# Patient Record
Sex: FEMALE | Race: WHITE
[De-identification: names, ages, dates, MRNs, and addresses within clinical notes are randomized per-mention and may not be internally consistent; named-entity substitution may affect disease eponyms.]

---

## 2018-02-15 ENCOUNTER — HOSPITAL ENCOUNTER (EMERGENCY)
Dept: HOSPITAL 53 - M ED | Age: 58
LOS: 1 days | Discharge: HOME | End: 2018-02-16
Payer: MEDICARE

## 2018-02-15 DIAGNOSIS — Z98.890: ICD-10-CM

## 2018-02-15 DIAGNOSIS — K80.20: Primary | ICD-10-CM

## 2018-02-15 DIAGNOSIS — Z86.69: ICD-10-CM

## 2018-02-15 DIAGNOSIS — Z87.442: ICD-10-CM

## 2018-02-15 DIAGNOSIS — Z79.899: ICD-10-CM

## 2018-02-15 DIAGNOSIS — Z88.5: ICD-10-CM

## 2018-02-15 DIAGNOSIS — Z98.0: ICD-10-CM

## 2018-02-15 DIAGNOSIS — F33.9: ICD-10-CM

## 2018-02-15 LAB
ALBUMIN/GLOBULIN RATIO: 1.14 (ref 1–1.93)
ALBUMIN: 4 GM/DL (ref 3.2–5.2)
ALKALINE PHOSPHATASE: 119 U/L (ref 45–117)
ALT SERPL W P-5'-P-CCNC: 22 U/L (ref 12–78)
AMORPHOUS SEDIMENT RFX: (no result)
ANION GAP: 6 MEQ/L (ref 8–16)
AST SERPL-CCNC: 20 U/L (ref 7–37)
BASO #: 0 10^3/UL (ref 0–0.2)
BASO %: 0.3 % (ref 0–1)
BILIRUB CONJ SERPL-MCNC: 0.1 MG/DL (ref 0–0.2)
BILIRUBIN,TOTAL: 0.6 MG/DL (ref 0.2–1)
BLOOD UREA NITROGEN: 12 MG/DL (ref 7–18)
CALCIUM LEVEL: 9.9 MG/DL (ref 8.5–10.1)
CARBON DIOXIDE LEVEL: 30 MEQ/L (ref 21–32)
CHLORIDE LEVEL: 104 MEQ/L (ref 98–107)
CREATININE FOR GFR: 0.79 MG/DL (ref 0.55–1.3)
EOS #: 0 10^3/UL (ref 0–0.5)
EOSINOPHIL NFR BLD AUTO: 0.3 % (ref 0–3)
GFR SERPL CREATININE-BSD FRML MDRD: > 60 ML/MIN/{1.73_M2} (ref 51–?)
GLUCOSE, FASTING: 118 MG/DL (ref 70–100)
HEMATOCRIT: 45.4 % (ref 36–47)
HEMOGLOBIN: 15 G/DL (ref 12–16)
IMMATURE GRANULOCYTE %: 0.2 % (ref 0–3)
LEUKOCYTE ESTERASE UR AUTO RFX: NEGATIVE
LIPASE: 118 U/L (ref 73–393)
LYMPH #: 1.7 10^3/UL (ref 1.5–4.5)
LYMPH %: 14.7 % (ref 24–44)
MEAN CORPUSCULAR HEMOGLOBIN: 29.8 PG (ref 27–33)
MEAN CORPUSCULAR HGB CONC: 33 G/DL (ref 32–36.5)
MEAN CORPUSCULAR VOLUME: 90.3 FL (ref 80–96)
MONO #: 0.4 10^3/UL (ref 0–0.8)
MONO %: 3.7 % (ref 0–5)
NEUTROPHILS #: 9.5 10^3/UL (ref 1.8–7.7)
NEUTROPHILS %: 80.8 % (ref 36–66)
NRBC BLD AUTO-RTO: 0 % (ref 0–0)
PLATELET COUNT, AUTOMATED: 282 10^3/UL (ref 150–450)
POTASSIUM SERUM: 4.1 MEQ/L (ref 3.5–5.1)
RED BLOOD COUNT: 5.03 10^6/UL (ref 4–5.4)
RED CELL DISTRIBUTION WIDTH: 13.8 % (ref 11.5–14.5)
SODIUM LEVEL: 140 MEQ/L (ref 136–145)
SPECIFIC GRAVITY UR AUTO RFX: 1.01 (ref 1–1.03)
SQUAM EPITHELIAL CELL UR AURFX: 0 /HPF (ref 0–6)
TOTAL PROTEIN: 7.5 GM/DL (ref 6.4–8.2)
WHITE BLOOD COUNT: 11.8 10^3/UL (ref 4–10)

## 2018-02-15 RX ADMIN — ONDANSETRON 1 MG: 2 INJECTION INTRAMUSCULAR; INTRAVENOUS at 21:28

## 2018-02-15 RX ADMIN — SODIUM CHLORIDE 1 MLS/HR: 9 INJECTION, SOLUTION INTRAVENOUS at 21:30

## 2018-02-15 RX ADMIN — KETOROLAC TROMETHAMINE 1 MG: 30 INJECTION, SOLUTION INTRAMUSCULAR at 21:28

## 2018-02-20 ENCOUNTER — HOSPITAL ENCOUNTER (INPATIENT)
Dept: HOSPITAL 53 - M PED | Age: 58
LOS: 5 days | Discharge: HOME | DRG: 336 | End: 2018-02-25
Attending: SURGERY | Admitting: SURGERY
Payer: MEDICARE

## 2018-02-20 DIAGNOSIS — M19.90: ICD-10-CM

## 2018-02-20 DIAGNOSIS — Z98.84: ICD-10-CM

## 2018-02-20 DIAGNOSIS — E66.9: ICD-10-CM

## 2018-02-20 DIAGNOSIS — K46.0: Primary | ICD-10-CM

## 2018-02-20 DIAGNOSIS — Z90.49: ICD-10-CM

## 2018-02-20 DIAGNOSIS — K56.52: ICD-10-CM

## 2018-02-20 DIAGNOSIS — Z96.653: ICD-10-CM

## 2018-02-20 LAB
ALBUMIN/GLOBULIN RATIO: 1.38 (ref 1–1.93)
ALBUMIN: 4.4 GM/DL (ref 3.2–5.2)
ALKALINE PHOSPHATASE: 148 U/L (ref 45–117)
ALT SERPL W P-5'-P-CCNC: 25 U/L (ref 12–78)
AMYLASE SERPL-CCNC: 41 U/L (ref 25–115)
ANION GAP: 10 MEQ/L (ref 8–16)
AST SERPL-CCNC: 36 U/L (ref 7–37)
BASO #: 0 10^3/UL (ref 0–0.2)
BASO %: 0.5 % (ref 0–1)
BILIRUB CONJ SERPL-MCNC: 0.2 MG/DL (ref 0–0.2)
BILIRUBIN,TOTAL: 0.8 MG/DL (ref 0.2–1)
BLOOD UREA NITROGEN: 19 MG/DL (ref 7–18)
CALCIUM LEVEL: 10.6 MG/DL (ref 8.5–10.1)
CARBON DIOXIDE LEVEL: 29 MEQ/L (ref 21–32)
CHLORIDE LEVEL: 102 MEQ/L (ref 98–107)
CREATININE FOR GFR: 1.03 MG/DL (ref 0.55–1.3)
EOS #: 0.1 10^3/UL (ref 0–0.5)
EOSINOPHIL NFR BLD AUTO: 0.6 % (ref 0–3)
GFR SERPL CREATININE-BSD FRML MDRD: 58.6 ML/MIN/{1.73_M2} (ref 51–?)
GLUCOSE, FASTING: 180 MG/DL (ref 70–100)
HEMATOCRIT: 53.5 % (ref 36–47)
HEMOGLOBIN: 17.6 G/DL (ref 12–16)
IMMATURE GRANULOCYTE %: 0.3 % (ref 0–3)
LIPASE: 75 U/L (ref 73–393)
LYMPH #: 1.8 10^3/UL (ref 1.5–4.5)
LYMPH %: 22.9 % (ref 24–44)
MEAN CORPUSCULAR HEMOGLOBIN: 29.6 PG (ref 27–33)
MEAN CORPUSCULAR HGB CONC: 32.9 G/DL (ref 32–36.5)
MEAN CORPUSCULAR VOLUME: 89.9 FL (ref 80–96)
MONO #: 0.9 10^3/UL (ref 0–0.8)
MONO %: 11.5 % (ref 0–5)
NEUTROPHILS #: 5 10^3/UL (ref 1.8–7.7)
NEUTROPHILS %: 64.2 % (ref 36–66)
NRBC BLD AUTO-RTO: 0 % (ref 0–0)
PLATELET COUNT, AUTOMATED: 346 10^3/UL (ref 150–450)
POTASSIUM SERUM: 4.2 MEQ/L (ref 3.5–5.1)
RED BLOOD COUNT: 5.95 10^6/UL (ref 4–5.4)
RED CELL DISTRIBUTION WIDTH: 13.6 % (ref 11.5–14.5)
SODIUM LEVEL: 141 MEQ/L (ref 136–145)
TOTAL PROTEIN: 7.6 GM/DL (ref 6.4–8.2)
WHITE BLOOD COUNT: 7.8 10^3/UL (ref 4–10)

## 2018-02-20 RX ADMIN — DEXTROSE MONOHYDRATE 1 MG: 50 INJECTION, SOLUTION INTRAVENOUS at 09:20

## 2018-02-20 RX ADMIN — POTASSIUM CHLORIDE, DEXTROSE MONOHYDRATE AND SODIUM CHLORIDE 1 MLS/HR: 150; 5; 450 INJECTION, SOLUTION INTRAVENOUS at 09:05

## 2018-02-20 RX ADMIN — DIPHENHYDRAMINE HYDROCHLORIDE 1 MG: 50 INJECTION, SOLUTION INTRAMUSCULAR; INTRAVENOUS at 14:29

## 2018-02-20 RX ADMIN — POTASSIUM CHLORIDE, DEXTROSE MONOHYDRATE AND SODIUM CHLORIDE 1 MLS/HR: 150; 5; 450 INJECTION, SOLUTION INTRAVENOUS at 18:21

## 2018-02-20 RX ADMIN — SODIUM CHLORIDE 1 UNITS: 4.5 INJECTION, SOLUTION INTRAVENOUS at 21:03

## 2018-02-20 RX ADMIN — SODIUM CHLORIDE 1 MLS/HR: 9 INJECTION, SOLUTION INTRAVENOUS at 06:35

## 2018-02-20 RX ADMIN — DIPHENHYDRAMINE HYDROCHLORIDE 1 MG: 50 INJECTION, SOLUTION INTRAMUSCULAR; INTRAVENOUS at 19:54

## 2018-02-20 RX ADMIN — SODIUM CHLORIDE 1 MLS/HR: 9 INJECTION, SOLUTION INTRAVENOUS at 05:12

## 2018-02-20 RX ADMIN — MORPHINE SULFATE 1 MG: 4 INJECTION INTRAVENOUS at 06:30

## 2018-02-20 RX ADMIN — SODIUM CHLORIDE 1 UNITS: 4.5 INJECTION, SOLUTION INTRAVENOUS at 14:22

## 2018-02-20 RX ADMIN — METOCLOPRAMIDE 1 MG: 5 INJECTION, SOLUTION INTRAMUSCULAR; INTRAVENOUS at 05:12

## 2018-02-20 RX ADMIN — MORPHINE SULFATE 1 MG: 4 INJECTION INTRAVENOUS at 19:55

## 2018-02-20 RX ADMIN — KETOROLAC TROMETHAMINE 1 MG: 30 INJECTION, SOLUTION INTRAMUSCULAR at 21:04

## 2018-02-20 RX ADMIN — MORPHINE SULFATE 1 MG: 4 INJECTION INTRAVENOUS at 14:30

## 2018-02-20 RX ADMIN — DIPHENHYDRAMINE HYDROCHLORIDE 1 MG: 50 INJECTION, SOLUTION INTRAMUSCULAR; INTRAVENOUS at 06:21

## 2018-02-20 RX ADMIN — KETOROLAC TROMETHAMINE 1 MG: 30 INJECTION, SOLUTION INTRAMUSCULAR at 10:49

## 2018-02-21 LAB
ALBUMIN/GLOBULIN RATIO: 1.07 (ref 1–1.93)
ALBUMIN: 3.2 GM/DL (ref 3.2–5.2)
ALKALINE PHOSPHATASE: 109 U/L (ref 45–117)
ALT SERPL W P-5'-P-CCNC: 23 U/L (ref 12–78)
ANION GAP: 2 MEQ/L (ref 8–16)
AST SERPL-CCNC: 30 U/L (ref 7–37)
BILIRUBIN,TOTAL: 0.7 MG/DL (ref 0.2–1)
BLOOD UREA NITROGEN: 19 MG/DL (ref 7–18)
CALCIUM LEVEL: 9.1 MG/DL (ref 8.5–10.1)
CARBON DIOXIDE LEVEL: 31 MEQ/L (ref 21–32)
CHLORIDE LEVEL: 105 MEQ/L (ref 98–107)
CREATININE FOR GFR: 0.89 MG/DL (ref 0.55–1.3)
GFR SERPL CREATININE-BSD FRML MDRD: > 60 ML/MIN/{1.73_M2} (ref 51–?)
GLUCOSE, FASTING: 139 MG/DL (ref 70–100)
HEMATOCRIT: 42.4 % (ref 36–47)
HEMOGLOBIN: 13.9 G/DL (ref 12–16)
LIPASE: 77 U/L (ref 73–393)
MAGNESIUM LEVEL: 2.5 MG/DL (ref 1.8–2.4)
MEAN CORPUSCULAR HEMOGLOBIN: 30 PG (ref 27–33)
MEAN CORPUSCULAR HGB CONC: 32.8 G/DL (ref 32–36.5)
MEAN CORPUSCULAR VOLUME: 91.4 FL (ref 80–96)
NRBC BLD AUTO-RTO: 0 % (ref 0–0)
PLATELET COUNT, AUTOMATED: 242 10^3/UL (ref 150–450)
POTASSIUM SERUM: 4.8 MEQ/L (ref 3.5–5.1)
RED BLOOD COUNT: 4.64 10^6/UL (ref 4–5.4)
RED CELL DISTRIBUTION WIDTH: 14 % (ref 11.5–14.5)
SODIUM LEVEL: 138 MEQ/L (ref 136–145)
TOTAL PROTEIN: 6.2 GM/DL (ref 6.4–8.2)
WHITE BLOOD COUNT: 4 10^3/UL (ref 4–10)

## 2018-02-21 RX ADMIN — ONDANSETRON 1 MG: 2 INJECTION INTRAMUSCULAR; INTRAVENOUS at 08:29

## 2018-02-21 RX ADMIN — POTASSIUM CHLORIDE, DEXTROSE MONOHYDRATE AND SODIUM CHLORIDE 1 MLS/HR: 150; 5; 450 INJECTION, SOLUTION INTRAVENOUS at 08:11

## 2018-02-21 RX ADMIN — SODIUM CHLORIDE 1 UNITS: 4.5 INJECTION, SOLUTION INTRAVENOUS at 06:18

## 2018-02-21 RX ADMIN — KETOROLAC TROMETHAMINE 1 MG: 30 INJECTION, SOLUTION INTRAMUSCULAR at 21:00

## 2018-02-21 RX ADMIN — KETOROLAC TROMETHAMINE 1 MG: 30 INJECTION, SOLUTION INTRAMUSCULAR at 12:35

## 2018-02-21 RX ADMIN — SODIUM CHLORIDE 1 UNITS: 4.5 INJECTION, SOLUTION INTRAVENOUS at 13:48

## 2018-02-21 RX ADMIN — DIPHENHYDRAMINE HYDROCHLORIDE 1 MG: 50 INJECTION, SOLUTION INTRAMUSCULAR; INTRAVENOUS at 13:42

## 2018-02-21 RX ADMIN — DEXTROSE MONOHYDRATE 1 MG: 50 INJECTION, SOLUTION INTRAVENOUS at 08:10

## 2018-02-21 RX ADMIN — MORPHINE SULFATE 1 MG: 4 INJECTION INTRAVENOUS at 08:31

## 2018-02-21 RX ADMIN — MORPHINE SULFATE 1 MG: 4 INJECTION INTRAVENOUS at 13:43

## 2018-02-21 RX ADMIN — MORPHINE SULFATE 1 MG: 4 INJECTION INTRAVENOUS at 20:26

## 2018-02-21 RX ADMIN — POTASSIUM CHLORIDE, DEXTROSE MONOHYDRATE AND SODIUM CHLORIDE 1 MLS/HR: 150; 5; 450 INJECTION, SOLUTION INTRAVENOUS at 16:43

## 2018-02-21 RX ADMIN — DIPHENHYDRAMINE HYDROCHLORIDE 1 MG: 50 INJECTION, SOLUTION INTRAMUSCULAR; INTRAVENOUS at 08:29

## 2018-02-21 RX ADMIN — SODIUM CHLORIDE 1 UNITS: 4.5 INJECTION, SOLUTION INTRAVENOUS at 22:14

## 2018-02-21 RX ADMIN — ONDANSETRON 1 MG: 2 INJECTION INTRAMUSCULAR; INTRAVENOUS at 01:25

## 2018-02-21 RX ADMIN — ONDANSETRON 1 MG: 2 INJECTION INTRAMUSCULAR; INTRAVENOUS at 14:55

## 2018-02-21 RX ADMIN — KETOROLAC TROMETHAMINE 1 MG: 30 INJECTION, SOLUTION INTRAMUSCULAR at 06:18

## 2018-02-21 RX ADMIN — MORPHINE SULFATE 1 MG: 4 INJECTION INTRAVENOUS at 01:18

## 2018-02-21 RX ADMIN — POTASSIUM CHLORIDE, DEXTROSE MONOHYDRATE AND SODIUM CHLORIDE 1 MLS/HR: 150; 5; 450 INJECTION, SOLUTION INTRAVENOUS at 01:33

## 2018-02-21 RX ADMIN — DIPHENHYDRAMINE HYDROCHLORIDE 1 MG: 50 INJECTION, SOLUTION INTRAMUSCULAR; INTRAVENOUS at 01:19

## 2018-02-21 RX ADMIN — MAGNESIUM HYDROXIDE 1 ML: 400 SUSPENSION ORAL at 16:46

## 2018-02-22 LAB
ALBUMIN/GLOBULIN RATIO: 0.97 (ref 1–1.93)
ALBUMIN: 3.1 GM/DL (ref 3.2–5.2)
ALKALINE PHOSPHATASE: 123 U/L (ref 45–117)
ALT SERPL W P-5'-P-CCNC: 24 U/L (ref 12–78)
ANION GAP: 4 MEQ/L (ref 8–16)
AST SERPL-CCNC: 25 U/L (ref 7–37)
BILIRUBIN,TOTAL: 0.6 MG/DL (ref 0.2–1)
BLOOD UREA NITROGEN: 12 MG/DL (ref 7–18)
CALCIUM LEVEL: 9.4 MG/DL (ref 8.5–10.1)
CARBON DIOXIDE LEVEL: 30 MEQ/L (ref 21–32)
CHLORIDE LEVEL: 107 MEQ/L (ref 98–107)
CREATININE FOR GFR: 0.79 MG/DL (ref 0.55–1.3)
GFR SERPL CREATININE-BSD FRML MDRD: > 60 ML/MIN/{1.73_M2} (ref 51–?)
GLUCOSE, FASTING: 121 MG/DL (ref 70–100)
HEMATOCRIT: 42.6 % (ref 36–47)
HEMOGLOBIN: 13.9 G/DL (ref 12–16)
LIPASE: 63 U/L (ref 73–393)
MAGNESIUM LEVEL: 2.2 MG/DL (ref 1.8–2.4)
MEAN CORPUSCULAR HEMOGLOBIN: 29.8 PG (ref 27–33)
MEAN CORPUSCULAR HGB CONC: 32.6 G/DL (ref 32–36.5)
MEAN CORPUSCULAR VOLUME: 91.2 FL (ref 80–96)
NRBC BLD AUTO-RTO: 0 % (ref 0–0)
PLATELET COUNT, AUTOMATED: 242 10^3/UL (ref 150–450)
POTASSIUM SERUM: 4.5 MEQ/L (ref 3.5–5.1)
RED BLOOD COUNT: 4.67 10^6/UL (ref 4–5.4)
RED CELL DISTRIBUTION WIDTH: 14 % (ref 11.5–14.5)
SODIUM LEVEL: 141 MEQ/L (ref 136–145)
TOTAL PROTEIN: 6.3 GM/DL (ref 6.4–8.2)
WHITE BLOOD COUNT: 4.4 10^3/UL (ref 4–10)

## 2018-02-22 RX ADMIN — SODIUM CHLORIDE 1 UNITS: 4.5 INJECTION, SOLUTION INTRAVENOUS at 14:58

## 2018-02-22 RX ADMIN — MORPHINE SULFATE 1 MG: 4 INJECTION INTRAVENOUS at 18:09

## 2018-02-22 RX ADMIN — POTASSIUM CHLORIDE, DEXTROSE MONOHYDRATE AND SODIUM CHLORIDE 1 MLS/HR: 150; 5; 450 INJECTION, SOLUTION INTRAVENOUS at 08:10

## 2018-02-22 RX ADMIN — POTASSIUM CHLORIDE, DEXTROSE MONOHYDRATE AND SODIUM CHLORIDE 1 MLS/HR: 150; 5; 450 INJECTION, SOLUTION INTRAVENOUS at 14:59

## 2018-02-22 RX ADMIN — MORPHINE SULFATE 1 MG: 4 INJECTION INTRAVENOUS at 21:29

## 2018-02-22 RX ADMIN — SODIUM CHLORIDE 1 UNITS: 4.5 INJECTION, SOLUTION INTRAVENOUS at 06:15

## 2018-02-22 RX ADMIN — MORPHINE SULFATE 1 MG: 4 INJECTION INTRAVENOUS at 14:59

## 2018-02-22 RX ADMIN — DEXTROSE MONOHYDRATE 1 MG: 50 INJECTION, SOLUTION INTRAVENOUS at 08:10

## 2018-02-22 RX ADMIN — KETOROLAC TROMETHAMINE 1 MG: 30 INJECTION, SOLUTION INTRAMUSCULAR at 19:55

## 2018-02-22 RX ADMIN — KETOROLAC TROMETHAMINE 1 MG: 30 INJECTION, SOLUTION INTRAMUSCULAR at 07:02

## 2018-02-22 RX ADMIN — MORPHINE SULFATE 1 MG: 4 INJECTION INTRAVENOUS at 06:16

## 2018-02-22 RX ADMIN — MORPHINE SULFATE 1 MG: 4 INJECTION INTRAVENOUS at 08:11

## 2018-02-22 RX ADMIN — ONDANSETRON 1 MG: 2 INJECTION INTRAMUSCULAR; INTRAVENOUS at 18:08

## 2018-02-22 RX ADMIN — MORPHINE SULFATE 1 MG: 4 INJECTION INTRAVENOUS at 11:38

## 2018-02-22 RX ADMIN — SODIUM CHLORIDE 1 UNITS: 4.5 INJECTION, SOLUTION INTRAVENOUS at 21:28

## 2018-02-22 RX ADMIN — ACETAMINOPHEN 1 MG: 500 TABLET ORAL at 15:06

## 2018-02-22 RX ADMIN — POTASSIUM CHLORIDE, DEXTROSE MONOHYDRATE AND SODIUM CHLORIDE 1 MLS/HR: 150; 5; 450 INJECTION, SOLUTION INTRAVENOUS at 00:25

## 2018-02-22 RX ADMIN — ACETAMINOPHEN 1 MG: 500 TABLET ORAL at 00:26

## 2018-02-23 LAB
ALBUMIN/GLOBULIN RATIO: 1.07 (ref 1–1.93)
ALBUMIN: 3.2 GM/DL (ref 3.2–5.2)
ALKALINE PHOSPHATASE: 136 U/L (ref 45–117)
ALT SERPL W P-5'-P-CCNC: 24 U/L (ref 12–78)
ANION GAP: 7 MEQ/L (ref 8–16)
AST SERPL-CCNC: 24 U/L (ref 7–37)
BILIRUBIN,TOTAL: 0.6 MG/DL (ref 0.2–1)
BLOOD UREA NITROGEN: 12 MG/DL (ref 7–18)
CALCIUM LEVEL: 9.1 MG/DL (ref 8.5–10.1)
CARBON DIOXIDE LEVEL: 26 MEQ/L (ref 21–32)
CHLORIDE LEVEL: 106 MEQ/L (ref 98–107)
CREATININE FOR GFR: 0.68 MG/DL (ref 0.55–1.3)
GFR SERPL CREATININE-BSD FRML MDRD: > 60 ML/MIN/{1.73_M2} (ref 51–?)
GLUCOSE, FASTING: 123 MG/DL (ref 70–100)
HEMATOCRIT: 43.6 % (ref 36–47)
HEMOGLOBIN: 14.3 G/DL (ref 12–16)
LIPASE: 82 U/L (ref 73–393)
MAGNESIUM LEVEL: 2.2 MG/DL (ref 1.8–2.4)
MEAN CORPUSCULAR HEMOGLOBIN: 29.6 PG (ref 27–33)
MEAN CORPUSCULAR HGB CONC: 32.8 G/DL (ref 32–36.5)
MEAN CORPUSCULAR VOLUME: 90.3 FL (ref 80–96)
NRBC BLD AUTO-RTO: 0 % (ref 0–0)
PLATELET COUNT, AUTOMATED: 264 10^3/UL (ref 150–450)
POTASSIUM SERUM: 4.9 MEQ/L (ref 3.5–5.1)
RED BLOOD COUNT: 4.83 10^6/UL (ref 4–5.4)
RED CELL DISTRIBUTION WIDTH: 13.9 % (ref 11.5–14.5)
SODIUM LEVEL: 139 MEQ/L (ref 136–145)
TOTAL PROTEIN: 6.2 GM/DL (ref 6.4–8.2)
WHITE BLOOD COUNT: 5.8 10^3/UL (ref 4–10)

## 2018-02-23 PROCEDURE — 0DN84ZZ RELEASE SMALL INTESTINE, PERCUTANEOUS ENDOSCOPIC APPROACH: ICD-10-PCS

## 2018-02-23 RX ADMIN — FENTANYL CITRATE 1 MCG: 50 INJECTION, SOLUTION INTRAMUSCULAR; INTRAVENOUS at 16:41

## 2018-02-23 RX ADMIN — ONDANSETRON 1 MG: 2 INJECTION INTRAMUSCULAR; INTRAVENOUS at 01:17

## 2018-02-23 RX ADMIN — MORPHINE SULFATE 1 MG: 4 INJECTION INTRAVENOUS at 07:06

## 2018-02-23 RX ADMIN — FENTANYL CITRATE 1 MCG: 50 INJECTION, SOLUTION INTRAMUSCULAR; INTRAVENOUS at 16:35

## 2018-02-23 RX ADMIN — SODIUM CHLORIDE, POTASSIUM CHLORIDE, SODIUM LACTATE AND CALCIUM CHLORIDE 1 MLS/HR: 600; 310; 30; 20 INJECTION, SOLUTION INTRAVENOUS at 16:30

## 2018-02-23 RX ADMIN — POTASSIUM CHLORIDE, DEXTROSE MONOHYDRATE AND SODIUM CHLORIDE 1 MLS/HR: 150; 5; 450 INJECTION, SOLUTION INTRAVENOUS at 07:06

## 2018-02-23 RX ADMIN — LIDOCAINE HYDROCHLORIDE,EPINEPHRINE BITARTRATE 1 ML: 10; .01 INJECTION, SOLUTION INFILTRATION; PERINEURAL at 16:01

## 2018-02-23 RX ADMIN — SODIUM CHLORIDE 1 UNITS: 4.5 INJECTION, SOLUTION INTRAVENOUS at 21:16

## 2018-02-23 RX ADMIN — KETOROLAC TROMETHAMINE 1 MG: 30 INJECTION, SOLUTION INTRAMUSCULAR at 06:06

## 2018-02-23 RX ADMIN — MORPHINE SULFATE 1 MG: 15 TABLET, EXTENDED RELEASE ORAL at 21:15

## 2018-02-23 RX ADMIN — DEXTROSE MONOHYDRATE 1 MG: 50 INJECTION, SOLUTION INTRAVENOUS at 09:00

## 2018-02-23 RX ADMIN — MORPHINE SULFATE 1 MG: 4 INJECTION INTRAVENOUS at 01:03

## 2018-02-23 RX ADMIN — ONDANSETRON 1 MG: 2 INJECTION INTRAMUSCULAR; INTRAVENOUS at 07:07

## 2018-02-23 RX ADMIN — SODIUM CHLORIDE 1 UNITS: 4.5 INJECTION, SOLUTION INTRAVENOUS at 14:00

## 2018-02-23 RX ADMIN — MORPHINE SULFATE 1 MG: 4 INJECTION INTRAVENOUS at 09:23

## 2018-02-23 RX ADMIN — KETOROLAC TROMETHAMINE 1 MG: 30 INJECTION, SOLUTION INTRAMUSCULAR at 12:15

## 2018-02-23 RX ADMIN — FENTANYL CITRATE 1 MCG: 50 INJECTION, SOLUTION INTRAMUSCULAR; INTRAVENOUS at 16:52

## 2018-02-23 RX ADMIN — BUPIVACAINE HYDROCHLORIDE 1 ML: 2.5 INJECTION, SOLUTION EPIDURAL; INFILTRATION; INTRACAUDAL at 16:01

## 2018-02-23 RX ADMIN — SODIUM CHLORIDE 1 UNITS: 4.5 INJECTION, SOLUTION INTRAVENOUS at 06:07

## 2018-02-23 RX ADMIN — SERTRALINE HYDROCHLORIDE 1 MG: 50 TABLET ORAL at 21:16

## 2018-02-23 RX ADMIN — POTASSIUM CHLORIDE, DEXTROSE MONOHYDRATE AND SODIUM CHLORIDE 1 MLS/HR: 150; 5; 450 INJECTION, SOLUTION INTRAVENOUS at 01:21

## 2018-02-23 RX ADMIN — POTASSIUM CHLORIDE, DEXTROSE MONOHYDRATE AND SODIUM CHLORIDE 1 MLS/HR: 150; 5; 450 INJECTION, SOLUTION INTRAVENOUS at 18:04

## 2018-02-24 LAB
ALBUMIN/GLOBULIN RATIO: 1.25 (ref 1–1.93)
ALBUMIN: 3 GM/DL (ref 3.2–5.2)
ALKALINE PHOSPHATASE: 149 U/L (ref 45–117)
ALT SERPL W P-5'-P-CCNC: 26 U/L (ref 12–78)
ANION GAP: 6 MEQ/L (ref 8–16)
AST SERPL-CCNC: 28 U/L (ref 7–37)
BILIRUBIN,TOTAL: 0.5 MG/DL (ref 0.2–1)
BLOOD UREA NITROGEN: 9 MG/DL (ref 7–18)
CALCIUM LEVEL: 8.9 MG/DL (ref 8.5–10.1)
CARBON DIOXIDE LEVEL: 26 MEQ/L (ref 21–32)
CHLORIDE LEVEL: 110 MEQ/L (ref 98–107)
CREATININE FOR GFR: 0.54 MG/DL (ref 0.55–1.3)
GFR SERPL CREATININE-BSD FRML MDRD: > 60 ML/MIN/{1.73_M2} (ref 51–?)
GLUCOSE, FASTING: 104 MG/DL (ref 70–100)
HEMATOCRIT: 40.6 % (ref 36–47)
HEMOGLOBIN: 13.2 G/DL (ref 12–16)
LIPASE: 197 U/L (ref 73–393)
MAGNESIUM LEVEL: 2.1 MG/DL (ref 1.8–2.4)
MEAN CORPUSCULAR HEMOGLOBIN: 29.3 PG (ref 27–33)
MEAN CORPUSCULAR HGB CONC: 32.5 G/DL (ref 32–36.5)
MEAN CORPUSCULAR VOLUME: 90.2 FL (ref 80–96)
NRBC BLD AUTO-RTO: 0 % (ref 0–0)
PLATELET COUNT, AUTOMATED: 246 10^3/UL (ref 150–450)
POTASSIUM SERUM: 4.3 MEQ/L (ref 3.5–5.1)
RED BLOOD COUNT: 4.5 10^6/UL (ref 4–5.4)
RED CELL DISTRIBUTION WIDTH: 13.6 % (ref 11.5–14.5)
SODIUM LEVEL: 142 MEQ/L (ref 136–145)
TOTAL PROTEIN: 5.4 GM/DL (ref 6.4–8.2)
WHITE BLOOD COUNT: 5.6 10^3/UL (ref 4–10)

## 2018-02-24 RX ADMIN — POTASSIUM CHLORIDE, DEXTROSE MONOHYDRATE AND SODIUM CHLORIDE 1 MLS/HR: 150; 5; 450 INJECTION, SOLUTION INTRAVENOUS at 01:00

## 2018-02-24 RX ADMIN — MORPHINE SULFATE 1 MG: 15 TABLET, EXTENDED RELEASE ORAL at 21:01

## 2018-02-24 RX ADMIN — SODIUM CHLORIDE 1 UNITS: 4.5 INJECTION, SOLUTION INTRAVENOUS at 14:45

## 2018-02-24 RX ADMIN — SODIUM CHLORIDE 1 UNITS: 4.5 INJECTION, SOLUTION INTRAVENOUS at 06:14

## 2018-02-24 RX ADMIN — SODIUM CHLORIDE 1 UNITS: 4.5 INJECTION, SOLUTION INTRAVENOUS at 21:02

## 2018-02-24 RX ADMIN — SODIUM CHLORIDE, PRESERVATIVE FREE 1 ML: 5 INJECTION INTRAVENOUS at 21:02

## 2018-02-24 RX ADMIN — POTASSIUM CHLORIDE, DEXTROSE MONOHYDRATE AND SODIUM CHLORIDE 1 MLS/HR: 150; 5; 450 INJECTION, SOLUTION INTRAVENOUS at 08:29

## 2018-02-24 RX ADMIN — MORPHINE SULFATE 1 MG: 15 TABLET, EXTENDED RELEASE ORAL at 06:13

## 2018-02-24 RX ADMIN — SERTRALINE HYDROCHLORIDE 1 MG: 50 TABLET ORAL at 21:00

## 2018-02-24 RX ADMIN — MORPHINE SULFATE 1 MG: 15 TABLET, EXTENDED RELEASE ORAL at 14:45

## 2018-02-24 RX ADMIN — DEXTROSE MONOHYDRATE 1 MG: 50 INJECTION, SOLUTION INTRAVENOUS at 08:29

## 2018-02-25 LAB
ALBUMIN/GLOBULIN RATIO: 1.03 (ref 1–1.93)
ALBUMIN: 3 GM/DL (ref 3.2–5.2)
ALKALINE PHOSPHATASE: 148 U/L (ref 45–117)
ALT SERPL W P-5'-P-CCNC: 62 U/L (ref 12–78)
ANION GAP: 5 MEQ/L (ref 8–16)
AST SERPL-CCNC: 71 U/L (ref 7–37)
BILIRUBIN,TOTAL: 0.4 MG/DL (ref 0.2–1)
BLOOD UREA NITROGEN: 10 MG/DL (ref 7–18)
CALCIUM LEVEL: 9.3 MG/DL (ref 8.5–10.1)
CARBON DIOXIDE LEVEL: 30 MEQ/L (ref 21–32)
CHLORIDE LEVEL: 109 MEQ/L (ref 98–107)
CREATININE FOR GFR: 0.74 MG/DL (ref 0.55–1.3)
GFR SERPL CREATININE-BSD FRML MDRD: > 60 ML/MIN/{1.73_M2} (ref 51–?)
GLUCOSE, FASTING: 91 MG/DL (ref 70–100)
HEMATOCRIT: 41.2 % (ref 36–47)
HEMOGLOBIN: 13.5 G/DL (ref 12–16)
LIPASE: 328 U/L (ref 73–393)
MAGNESIUM LEVEL: 2 MG/DL (ref 1.8–2.4)
MEAN CORPUSCULAR HEMOGLOBIN: 29.9 PG (ref 27–33)
MEAN CORPUSCULAR HGB CONC: 32.8 G/DL (ref 32–36.5)
MEAN CORPUSCULAR VOLUME: 91.2 FL (ref 80–96)
NRBC BLD AUTO-RTO: 0 % (ref 0–0)
PLATELET COUNT, AUTOMATED: 242 10^3/UL (ref 150–450)
POTASSIUM SERUM: 4.4 MEQ/L (ref 3.5–5.1)
RED BLOOD COUNT: 4.52 10^6/UL (ref 4–5.4)
RED CELL DISTRIBUTION WIDTH: 13.9 % (ref 11.5–14.5)
SODIUM LEVEL: 144 MEQ/L (ref 136–145)
TOTAL PROTEIN: 5.9 GM/DL (ref 6.4–8.2)
WHITE BLOOD COUNT: 5 10^3/UL (ref 4–10)

## 2018-02-25 RX ADMIN — DEXTROSE MONOHYDRATE 1 MG: 50 INJECTION, SOLUTION INTRAVENOUS at 08:29

## 2018-02-25 RX ADMIN — SODIUM CHLORIDE 1 UNITS: 4.5 INJECTION, SOLUTION INTRAVENOUS at 06:46

## 2018-02-25 RX ADMIN — MORPHINE SULFATE 1 MG: 15 TABLET, EXTENDED RELEASE ORAL at 06:46

## 2018-02-25 RX ADMIN — SODIUM CHLORIDE, PRESERVATIVE FREE 1 ML: 5 INJECTION INTRAVENOUS at 06:47

## 2018-07-18 ENCOUNTER — HOSPITAL ENCOUNTER (OUTPATIENT)
Dept: HOSPITAL 53 - M OPP | Age: 58
Discharge: HOME | End: 2018-07-18
Attending: SURGERY
Payer: MEDICARE

## 2018-07-18 DIAGNOSIS — Z96.643: ICD-10-CM

## 2018-07-18 DIAGNOSIS — Z90.710: ICD-10-CM

## 2018-07-18 DIAGNOSIS — F32.9: ICD-10-CM

## 2018-07-18 DIAGNOSIS — Z79.82: ICD-10-CM

## 2018-07-18 DIAGNOSIS — M19.90: ICD-10-CM

## 2018-07-18 DIAGNOSIS — Z79.899: ICD-10-CM

## 2018-07-18 DIAGNOSIS — R19.5: ICD-10-CM

## 2018-07-18 DIAGNOSIS — Z82.49: ICD-10-CM

## 2018-07-18 DIAGNOSIS — Z98.84: ICD-10-CM

## 2018-07-18 DIAGNOSIS — Z86.19: ICD-10-CM

## 2018-07-18 DIAGNOSIS — R51: ICD-10-CM

## 2018-07-18 DIAGNOSIS — M79.7: ICD-10-CM

## 2018-07-18 DIAGNOSIS — Z96.652: ICD-10-CM

## 2018-07-18 DIAGNOSIS — Z79.891: ICD-10-CM

## 2018-07-18 DIAGNOSIS — Q43.8: ICD-10-CM

## 2018-07-18 DIAGNOSIS — Z83.6: ICD-10-CM

## 2018-07-18 DIAGNOSIS — K63.5: Primary | ICD-10-CM

## 2018-07-18 PROCEDURE — 45380 COLONOSCOPY AND BIOPSY: CPT

## 2018-07-18 RX ADMIN — SODIUM CHLORIDE 1 MLS/HR: 9 INJECTION, SOLUTION INTRAVENOUS at 09:02

## 2021-01-20 ENCOUNTER — HOSPITAL ENCOUNTER (INPATIENT)
Dept: HOSPITAL 53 - M ED | Age: 61
LOS: 7 days | Discharge: HOME | DRG: 308 | End: 2021-01-27
Attending: INTERNAL MEDICINE | Admitting: INTERNAL MEDICINE
Payer: MEDICARE

## 2021-01-20 VITALS — HEIGHT: 67 IN | WEIGHT: 202.16 LBS | BODY MASS INDEX: 31.73 KG/M2

## 2021-01-20 DIAGNOSIS — E66.9: ICD-10-CM

## 2021-01-20 DIAGNOSIS — K21.9: ICD-10-CM

## 2021-01-20 DIAGNOSIS — Z90.49: ICD-10-CM

## 2021-01-20 DIAGNOSIS — I50.23: ICD-10-CM

## 2021-01-20 DIAGNOSIS — Z98.84: ICD-10-CM

## 2021-01-20 DIAGNOSIS — Z79.82: ICD-10-CM

## 2021-01-20 DIAGNOSIS — Z96.651: ICD-10-CM

## 2021-01-20 DIAGNOSIS — Z88.5: ICD-10-CM

## 2021-01-20 DIAGNOSIS — Z96.642: ICD-10-CM

## 2021-01-20 DIAGNOSIS — Z20.822: ICD-10-CM

## 2021-01-20 DIAGNOSIS — I48.91: Primary | ICD-10-CM

## 2021-01-20 DIAGNOSIS — M19.90: ICD-10-CM

## 2021-01-20 DIAGNOSIS — F32.9: ICD-10-CM

## 2021-01-20 DIAGNOSIS — Z79.899: ICD-10-CM

## 2021-01-20 LAB
ALBUMIN SERPL BCG-MCNC: 3.6 GM/DL (ref 3.2–5.2)
ALT SERPL W P-5'-P-CCNC: 30 U/L (ref 12–78)
APTT BLD: 30.5 SECONDS (ref 24.2–38.5)
BASOPHILS # BLD AUTO: 0 10^3/UL (ref 0–0.2)
BASOPHILS NFR BLD AUTO: 0.7 % (ref 0–1)
BILIRUB CONJ SERPL-MCNC: 0.2 MG/DL (ref 0–0.2)
BILIRUB SERPL-MCNC: 0.6 MG/DL (ref 0.2–1)
BUN SERPL-MCNC: 20 MG/DL (ref 7–18)
CALCIUM SERPL-MCNC: 9.8 MG/DL (ref 8.8–10.2)
CHLORIDE SERPL-SCNC: 107 MEQ/L (ref 98–107)
CK MB CFR.DF SERPL CALC: 2.95
CK MB SERPL-MCNC: 1.8 NG/ML (ref ?–3.6)
CK SERPL-CCNC: 61 U/L (ref 26–192)
CO2 SERPL-SCNC: 29 MEQ/L (ref 21–32)
CREAT SERPL-MCNC: 0.98 MG/DL (ref 0.55–1.3)
EOSINOPHIL # BLD AUTO: 0.1 10^3/UL (ref 0–0.5)
EOSINOPHIL NFR BLD AUTO: 1.7 % (ref 0–3)
GFR SERPL CREATININE-BSD FRML MDRD: > 60 ML/MIN/{1.73_M2} (ref 45–?)
GLUCOSE SERPL-MCNC: 100 MG/DL (ref 70–100)
HCT VFR BLD AUTO: 38.9 % (ref 36–47)
HGB BLD-MCNC: 12.1 G/DL (ref 12–15.5)
INR PPP: 1.21
LYMPHOCYTES # BLD AUTO: 1.7 10^3/UL (ref 1.5–5)
LYMPHOCYTES NFR BLD AUTO: 31.3 % (ref 24–44)
MAGNESIUM SERPL-MCNC: 2 MG/DL (ref 1.8–2.4)
MCH RBC QN AUTO: 29.9 PG (ref 27–33)
MCHC RBC AUTO-ENTMCNC: 31.1 G/DL (ref 32–36.5)
MCV RBC AUTO: 96 FL (ref 80–96)
MONOCYTES # BLD AUTO: 0.4 10^3/UL (ref 0–0.8)
MONOCYTES NFR BLD AUTO: 8 % (ref 0–5)
NEUTROPHILS # BLD AUTO: 3.1 10^3/UL (ref 1.5–8.5)
NEUTROPHILS NFR BLD AUTO: 57.9 % (ref 36–66)
NT-PRO BNP: 2852 PG/ML (ref ?–125)
PHOSPHATE SERPL-MCNC: 3.9 MG/DL (ref 2.5–4.9)
PLATELET # BLD AUTO: 279 10^3/UL (ref 150–450)
POTASSIUM SERPL-SCNC: 4.4 MEQ/L (ref 3.5–5.1)
PROT SERPL-MCNC: 6.1 GM/DL (ref 6.4–8.2)
PROTHROMBIN TIME: 15.6 SECONDS (ref 12.5–14.3)
RBC # BLD AUTO: 4.05 10^6/UL (ref 4–5.4)
RSV RNA NPH QL NAA+PROBE: NEGATIVE
SODIUM SERPL-SCNC: 141 MEQ/L (ref 136–145)
T4 SERPL-MCNC: 8.2 UG/DL (ref 4.5–12)
TROPONIN I SERPL-MCNC: < 0.02 NG/ML (ref ?–0.1)
TSH SERPL DL<=0.005 MIU/L-ACNC: 3.2 UIU/ML (ref 0.36–3.74)
WBC # BLD AUTO: 5.4 10^3/UL (ref 4–10)

## 2021-01-20 RX ADMIN — METOPROLOL TARTRATE SCH MG: 5 INJECTION INTRAVENOUS at 19:33

## 2021-01-20 RX ADMIN — METOPROLOL TARTRATE SCH MG: 5 INJECTION INTRAVENOUS at 19:19

## 2021-01-20 RX ADMIN — METOPROLOL TARTRATE SCH MG: 5 INJECTION INTRAVENOUS at 19:26

## 2021-01-20 NOTE — REP
INDICATION:

DYSPNEA/COUGH.



COMPARISON:

01/19/2021.



TECHNIQUE:

SINGLE PORTABLE AP VIEW OF THE CHEST WAS PERFORMED.



FINDINGS:

Mild cardiomegaly.  There is vascular congestion to moderate diffuse interstitial

edema.  The mediastinal silhouette otherwise appears unremarkable.



IMPRESSION:

Mild cardiomegaly, vascular congestion, and mild to moderate diffuse interstitial

edema.





<Electronically signed by Garrick Urrutia > 01/20/21 1948

## 2021-01-20 NOTE — CCD
Summarization Of Episode

                             Created on: 2021



YUSRA DIETZ

External Reference #: [id number]

: 1960

Sex: Female



Demographics





                          Address                   [address] [place] [address]  [id number]

 

                          Home Phone                [phone]

 

                          Preferred Language        English

 

                          Marital Status            

 

                          Rastafarian Affiliation     [facility]

 

                          Race                      White

 

                          Ethnic Group              Not  or 





Author





                          Author                    [facility]

 

                          Organization              [facility]

 

                          Address                   Unknown

 

                          Phone                     Unavailable







Support





                Name            Relationship    Address         Phone

 

                DISABLED        Next Of Kin     Unknown         Unavailable

 

                    GOLDY DIETZ    Next Of Kin         [id number] [place] [address]  [id number] [phone]

 

                    Goldy Dietz    ECON                [address] 

[place]  [address]               [phone]







Care Team Providers





                    Care Team Member Name Role                Phone

 

                    HAKEEM Dickerson MD Unavailable         Unavailable

 

                    Tuan, HAKEEM Williamson MD Unavailable         Unavailable

 

                    Tuan, HAKEEM Williamson MD Unavailable         Unavailable

 

                    Tuan, HAKEEM Williamson MD Unavailable         Unavailable

 

                    Tuan, HAKEEM Williamson MD Unavailable         Unavailable

 

                    Tuan, HAKEEM Williamson MD Unavailable         Unavailable

 

                    Tuan, HAKEEM Williamson MD Unavailable         Unavailable

 

                    Tuan, HAKEEM Williamson MD Unavailable         Unavailable

 

                    Tuan, HAKEEM Williamson MD Unavailable         Unavailable

 

                    Tuan, HAKEEM Williamson MD Unavailable         Unavailable

 

                    Tuan, HAKEEM Williamson MD Unavailable         Unavailable

 

                    Tuan, HAKEEM Williamson MD Unavailable         Unavailable

 

                    Tuan, HAKEEM Williamson MD Unavailable         Unavailable

 

                    Tuan, HAKEEM Williamson MD Unavailable         Unavailable

 

                    Tuan, HAKEEM Williamson MD Unavailable         Unavailable

 

                    Tuan, HAKEEM Williamson MD Unavailable         Unavailable

 

                    Tuan, HAKEEM Williamson MD Unavailable         Unavailable

 

                    Tuan, HAKEEM Williamson MD Unavailable         Unavailable

 

                    Tuan, HAKEEM Williamson MD Unavailable         Unavailable

 

                    Tuan, HAKEEM Williamson MD Unavailable         Unavailable

 

                    Tuan, HAKEEM Williamson MD Unavailable         Unavailable

 

                    Tuan, HAKEEM Williamson MD Unavailable         Unavailable

 

                    Tuan, HAKEEM Williamson MD Unavailable         Unavailable

 

                    Tuan, HAKEEM Williamson MD Unavailable         Unavailable

 

                    Tuan, HAKEEM Williamson MD Unavailable         Unavailable

 

                    Tuan, HAKEEM Williamson MD Unavailable         Unavailable

 

                    Tuan, HAKEEM Williamson MD Unavailable         Unavailable

 

                    Tuan, HAKEEM Williamson MD Unavailable         Unavailable

 

                    Tuan, HAKEEM Williamson MD Unavailable         Unavailable

 

                    Tuan, HAKEEM Williamson MD Unavailable         Unavailable

 

                    Tuan, HAKEEM Williamson MD Unavailable         Unavailable

 

                    Tuan, HAKEEM Williamson MD Unavailable         Unavailable

 

                    Tuan, HAKEEM Williamson MD Unavailable         Unavailable

 

                    Tuan, HAKEEM Williamson MD Unavailable         Unavailable

 

                    Tuan, HAKEEM Williamson MD Unavailable         Unavailable

 

                    Tuan, HAKEEM Williamson MD Unavailable         Unavailable

 

                    Tuan, HAKEEM Williamson MD Unavailable         Unavailable

 

                    Tuan, HAKEEM Williamson MD Unavailable         Unavailable

 

                    Tuan, HAKEEM Williamson MD Unavailable         Unavailable

 

                    Tuan, HAKEEM Williamson MD Unavailable         Unavailable

 

                    Tuan, HAKEEM Williamson MD Unavailable         Unavailable

 

                    Tuan, HAKEEM Williamson MD Unavailable         Unavailable

 

                    Tuan, HAKEEM Williamson MD Unavailable         Unavailable

 

                    Tuan, HAKEEM Williamson MD Unavailable         Unavailable

 

                    Tuan, HAKEEM Williamson MD Unavailable         Unavailable

 

                    Tuan, HAKEEM Williamson MD Unavailable         Unavailable

 

                    Tuan, HAKEEM Williamson MD Unavailable         Unavailable

 

                    Tuan, HAKEEM Williamson MD Unavailable         Unavailable

 

                    Tuan, HAKEEM Williamson MD Unavailable         Unavailable

 

                    Tuan, HAKEEM Williamson MD Unavailable         Unavailable

 

                    Tuan, HAKEEM Williamson MD Unavailable         Unavailable

 

                    Tuan, HAKEEM Williamson MD Unavailable         Unavailable

 

                    Tuan, HAKEEM Williamson MD Unavailable         Unavailable

 

                    Tuan, HAKEEM Williamson MD Unavailable         Unavailable

 

                    Tuan, HAKEEM Williamson MD Unavailable         Unavailable

 

                    Tuan, HAKEEM Williamson MD Unavailable         Unavailable

 

                    Tuan, HAKEEM Williamson MD Unavailable         Unavailable

 

                    Tuan, HAKEEM Williamson MD Unavailable         Unavailable

 

                    Tuan, A Teri TORRES Unavailable         Unavailable

 

                    Tuan, HAKEEM Williamson MD Unavailable         Unavailable

 

                    Tuan, A Teri TORRES Unavailable         Unavailable

 

                    Tuan, HAKEEM Williamson MD Unavailable         Unavailable

 

                    Tuan, HAKEEM Williamson MD Unavailable         Unavailable

 

                    Tuan, HAKEEM Williamson MD Unavailable         Unavailable

 

                    Tuan, HAKEEM Williamson MD Unavailable         Unavailable

 

                    Tuan, HAKEEM Williamson MD Unavailable         Unavailable

 

                    Tuan, A Teri TORRES Unavailable         Unavailable

 

                    Tuan, A Teri TORRES Unavailable         Unavailable

 

                    Tuan, HAKEEM Williamson MD Unavailable         Unavailable

 

                    Tuan, HAKEEM Williamson MD Unavailable         Unavailable

 

                    Tuan, HAKEEM Williamson MD Unavailable         Unavailable

 

                    Tuan, HAKEEM Williamson MD Unavailable         Unavailable

 

                    Tuan, HAKEEM Williamson MD Unavailable         Unavailable

 

                    Tuan, HAKEEM Williamson MD Unavailable         Unavailable

 

                    Tuan, HAKEEM Williamson MD Unavailable         Unavailable

 

                    PATRICK Bundy MD   Unavailable         Unavailable

 

                    PATRICK Bundy MD   Unavailable         Unavailable

 

                    PATRICK Bundy MD   Unavailable         Unavailable

 

                    PATRICK Bundy MD   Unavailable         Unavailable

 

                    PATRICK Bundy MD   Unavailable         Unavailable

 

                    PATRICK Bundy MD   Unavailable         Unavailable

 

                    PATRICK Bundy MD   Unavailable         Unavailable

 

                    PATRICK Bundy MD   Unavailable         Unavailable

 

                    PATRICK Bundy MD   Unavailable         Unavailable

 

                    PATRICK Bundy MD   Unavailable         Unavailable

 

                    PATRICK Bundy MD   Unavailable         Unavailable

 

                    PATRICK Bundy MD   Unavailable         Unavailable

 

                    PATRICK Bundy MD   Unavailable         Unavailable

 

                    PATRICK Bundy MD   Unavailable         Unavailable

 

                    PATRICK Bundy MD   Unavailable         Unavailable

 

                    PATRICK Bundy MD   Unavailable         Unavailable

 

                    PATRICK Bundy MD   Unavailable         Unavailable

 

                    PATRICK Bundy MD   Unavailable         Unavailable

 

                    PATRICK Bundy MD   Unavailable         Unavailable

 

                    PATRICK Bundy MD   Unavailable         Unavailable

 

                    PATRICK Bundy MD   Unavailable         Unavailable

 

                    PATRICK Bundy MD   Unavailable         Unavailable

 

                    PATRICK Bundy MD   Unavailable         Unavailable

 

                    PATRICK Bundy MD   Unavailable         Unavailable

 

                    PATRICK Bundy MD   Unavailable         Unavailable

 

                    PATRICK Bundy MD   Unavailable         Unavailable

 

                    PATRICK Bundy MD   Unavailable         Unavailable

 

                    PATRICK Bundy MD   Unavailable         Unavailable

 

                    PATRICK Bundy MD   Unavailable         Unavailable

 

                    PATRICK Bundy MD   Unavailable         Unavailable

 

                    PATRICK Bundy MD   Unavailable         Unavailable

 

                    PATRICK Bundy MD   Unavailable         Unavailable

 

                    PATRICK Bundy MD   Unavailable         Unavailable

 

                    PATRICK Bundy MD   Unavailable         Unavailable

 

                    PATRICK Bundy MD   Unavailable         Unavailable

 

                    PATRICK Bundy MD   Unavailable         Unavailable

 

                    PATRICK Budny MD   Unavailable         Unavailable

 

                    PATRICK Bundy MD   Unavailable         Unavailable

 

                    PATRICK Bundy MD   Unavailable         Unavailable

 

                    PATRICK Bundy MD   Unavailable         Unavailable

 

                    PATRICK Bundy MD   Unavailable         Unavailable

 

                    PATRICK Bundy MD   Unavailable         Unavailable

 

                    PATRICK Bundy MD   Unavailable         Unavailable

 

                    PATRICK Bundy MD   Unavailable         Unavailable

 

                    PATRICK Bundy MD   Unavailable         Unavailable

 

                    PATRICK Bundy MD   Unavailable         Unavailable

 

                    PATRICK Bundy MD   Unavailable         Unavailable

 

                    PATRICK Bundy MD   Unavailable         Unavailable

 

                    PATRICK Bundy MD   Unavailable         Unavailable

 

                    PATRICK Bundy MD   Unavailable         Unavailable

 

                    PATRICK Bundy MD   Unavailable         Unavailable

 

                    PATRICK Bundy MD   Unavailable         Unavailable

 

                    PATRICK Bundy MD   Unavailable         Unavailable

 

                    PATRICK Bundy MD   Unavailable         Unavailable

 

                    PATRICK Bundy MD   Unavailable         Unavailable

 

                    PATRICK Bundy MD   Unavailable         Unavailable

 

                    PATRICK Bundy MD   Unavailable         Unavailable

 

                    PATRICK Bundy MD   Unavailable         Unavailable

 

                    PATRICK Bundy MD   Unavailable         Unavailable

 

                    PATRICK Bundy MD   Unavailable         Unavailable

 

                    PATRICK Bundy MD   Unavailable         Unavailable

 

                    PATRICK Bundy MD   Unavailable         Unavailable

 

                    PATRICK Bundy MD   Unavailable         Unavailable

 

                    PATRICK Bundy MD   Unavailable         Unavailable

 

                    PATRICK Bundy MD   Unavailable         Unavailable

 

                    PATRICK Bundy MD   Unavailable         Unavailable

 

                    PATRICK Bundy MD   Unavailable         Unavailable

 

                    PATRICK Bundy MD   Unavailable         Unavailable

 

                    PATRICK Bundy MD   Unavailable         Unavailable

 

                    PATRICK Bundy MD   Unavailable         Unavailable

 

                    PATRICK Bundy MD   Unavailable         Unavailable

 

                    PATRICK Bundy MD   Unavailable         Unavailable

 

                    PATRICK Bundy MD   Unavailable         Unavailable

 

                    PATRICK Bundy MD   Unavailable         Unavailable

 

                    PATRICK Bundy MD   Unavailable         Unavailable

 

                    PATRICK Bundy MD   Unavailable         Unavailable

 

                    PATRICK Bundy MD   Unavailable         Unavailable

 

                    PATRICK Bundy MD   Unavailable         Unavailable

 

                    PATRICK Bundy MD   Unavailable         Unavailable

 

                    PATRICK Bundy MD   Unavailable         Unavailable

 

                    PATRICK Bundy MD   Unavailable         Unavailable

 

                    PATRICK Bundy MD   Unavailable         Unavailable

 

                    PATRICK Bundy MD   Unavailable         Unavailable

 

                    PATRICK Bundy MD   Unavailable         Unavailable

 

                    PATRICK Bundy MD   Unavailable         Unavailable

 

                    PATRICK Bundy MD   Unavailable         Unavailable

 

                    PATRICK Bundy MD   Unavailable         Unavailable

 

                    PATRICK Bundy MD   Unavailable         Unavailable

 

                    PATRICK Bundy MD   Unavailable         Unavailable

 

                    MISHA Salazar MD Unavailable         Unavailable

 

                    Miller, L Ana NP Unavailable         Unavailable

 

                    Miller, L Ana NP Unavailable         Unavailable

 

                    Miller, L Ana NP Unavailable         Unavailable

 

                    Miller, L Ana NP Unavailable         Unavailable

 

                    Miller, L Ana NP Unavailable         Unavailable

 

                    Miller, L Ana NP Unavailable         Unavailable

 

                    Miller, L Ana NP Unavailable         Unavailable

 

                    Miller, L Ana NP Unavailable         Unavailable

 

                    Miller, L Ana NP Unavailable         Unavailable

 

                    Miller, L Ana NP Unavailable         Unavailable

 

                    Miller, L Ana NP Unavailable         Unavailable

 

                    Miller, L Ana NP Unavailable         Unavailable

 

                    Miller, L Ana NP Unavailable         Unavailable

 

                    Miller, L Ana NP Unavailable         Unavailable

 

                    Miller, L Ana NP Unavailable         Unavailable

 

                    Miller, L Ana NP Unavailable         Unavailable

 

                    Miller, L Ana NP Unavailable         Unavailable

 

                    Miller, L Ana NP Unavailable         Unavailable

 

                    Miller, L Ana NP Unavailable         Unavailable

 

                    Miller, L Ana NP Unavailable         Unavailable

 

                    Miller, L Ana NP Unavailable         Unavailable

 

                    Miller, L Ana NP Unavailable         Unavailable

 

                    Miller, L Ana NP Unavailable         Unavailable

 

                    Miller, L Ana NP Unavailable         Unavailable

 

                    Miller, L Ana NP Unavailable         Unavailable

 

                    Miller, L Ana NP Unavailable         Unavailable

 

                    Miller, L Ana NP Unavailable         Unavailable

 

                    Miller, L Ana NP Unavailable         Unavailable

 

                    Miller, L Ana NP Unavailable         Unavailable

 

                    GLAZA, D ARABELLA NP   Unavailable         Unavailable

 

                    GLAZA, D ARABELLA NP   Unavailable         Unavailable

 

                    GLAZA, D ARABELLA NP   Unavailable         Unavailable

 

                    GLAZA, D ARABELLA NP   Unavailable         Unavailable

 

                    GLAZA, D ARABELLA NP   Unavailable         Unavailable

 

                    GLAZA, D ARABELLA NP   Unavailable         Unavailable

 

                    GLAZA, D ARABELLA NP   Unavailable         Unavailable

 

                    GLAZA, D ARABELLA NP   Unavailable         Unavailable

 

                    GLAZA, D ARABELLA NP   Unavailable         Unavailable

 

                    GLAZA, D ARABELLA NP   Unavailable         Unavailable

 

                    GLAZA, D ARABELLA NP   Unavailable         Unavailable

 

                    GLAZA, D ARABELLA NP   Unavailable         Unavailable

 

                    GLAZA, D ARABELLA NP   Unavailable         Unavailable

 

                    GLAZA, D ARABELLA NP   Unavailable         Unavailable

 

                    GLAZA, D ARABELLA NP   Unavailable         Unavailable

 

                    GLAZA, D ARABELLA NP   Unavailable         Unavailable

 

                    GLAZA, D ARABELLA NP   Unavailable         Unavailable

 

                    GLAZA, D ARABELLA NP   Unavailable         Unavailable

 

                    GLAZA, D ARABELLA NP   Unavailable         Unavailable

 

                    GLAZA, D ARABELLA NP   Unavailable         Unavailable

 

                    GLAZA, D ARABELLA NP   Unavailable         Unavailable

 

                    GLAZA, D ARABELLA NP   Unavailable         Unavailable

 

                    GLAZA, D ARABELLA NP   Unavailable         Unavailable

 

                    GLAZA, D ARABELLA NP   Unavailable         Unavailable

 

                    GLAZA, D ARABELLA NP   Unavailable         Unavailable

 

                    GLAZA, D ARABELLA NP   Unavailable         Unavailable

 

                    GLAZA, D ARABELLA NP   Unavailable         Unavailable

 

                    GLAZA, D ARABELLA NP   Unavailable         Unavailable

 

                    GLAZA, D ARABELLA NP   Unavailable         Unavailable

 

                    GLAZA, D ARABELLA NP   Unavailable         Unavailable

 

                    GLAZA, D ARABELLA NP   Unavailable         Unavailable

 

                    GLAZA, D ARABELLA NP   Unavailable         Unavailable

 

                    GLAZA, D ARABELLA NP   Unavailable         Unavailable

 

                    GLAZA, D ARABELLA NP   Unavailable         Unavailable

 

                    GLAZA, D ARABELLA NP   Unavailable         Unavailable

 

                    GLAZA, D ARABELLA NP   Unavailable         Unavailable

 

                    GLAZA, D ARABELLA NP   Unavailable         Unavailable

 

                    GLAZA, D ARABELLA NP   Unavailable         Unavailable

 

                    Petrancosta, [place] Raya PA-C Unavailable         Unavailabl

e

 

                    Petrancosta, [place] Raya PA-C Unavailable         Unavailabl

e

 

                    Petrancosta, [place] Raya PA-C Unavailable         Unavailabl

e

 

                    Petrancosta, [place] Raya PA-C Unavailable         Unavailabl

e

 

                    Petrancosta, [place] Raya PA-C Unavailable         Unavailabl

e

 

                    Petrancosta, [place] Raya PA-C Unavailable         Unavailabl

e

 

                    Petrancosta, [place] Raya PA-C Unavailable         Unavailabl

e

 

                    Petrancosta, [place] Raya PA-C Unavailable         Unavailabl

e

 

                    Petrancosta, [place] Raya PA-C Unavailable         Unavailabl

e

 

                    Petrancosta, [place] Raya PA-C Unavailable         Unavailabl

e

 

                    Petrancosta, [place] Raya PA-C Unavailable         Unavailabl

e

 

                    Petrancosta, [place] Raya PA-C Unavailable         Unavailabl

e

 

                    Petrancosta, [place] Raya PA-C Unavailable         Unavailabl

e

 

                    Petrancosta, [place] Raya PA-C Unavailable         Unavailabl

e

 

                    Petrancosta, [place] Raya PA-C Unavailable         Unavailabl

e

 

                    Petrancosta, [place] Raya PA-C Unavailable         Unavailabl

e

 

                    Petrancosta, [place] Raya PA-C Unavailable         Unavailabl

e

 

                    Petrancosta, [place] Raya PA-C Unavailable         Unavailabl

e

 

                    Petrancosta, [place] Raya PA-C Unavailable         Unavailabl

e

 

                    Petrancosta, [place] Raya PA-C Unavailable         Unavailabl

e

 

                    Petrancosta, [place] Raya PA-C Unavailable         Unavailabl

e

 

                    Petrancosta, [place] Raya PA-C Unavailable         Unavailabl

e

 

                    Petrancosta, [place] Raya PA-C Unavailable         Unavailabl

e

 

                    Lysius, B Midzy NP  Unavailable         Unavailable

 

                    Lysius, B Midzy NP  Unavailable         Unavailable

 

                    Lysius, B Midzy NP  Unavailable         Unavailable

 

                    Lysius, B Midzy NP  Unavailable         Unavailable

 

                    Lysius, B Midzy NP  Unavailable         Unavailable

 

                    Lysius, B Midzy NP  Unavailable         Unavailable

 

                    Lysius, B Midzy NP  Unavailable         Unavailable

 

                    Lysius, B Midzy NP  Unavailable         Unavailable

 

                    Lysius, B Midzy NP  Unavailable         Unavailable

 

                    Lysius, B Midzy NP  Unavailable         Unavailable

 

                    Lysius, B Midzy NP  Unavailable         Unavailable

 

                    Lysius, B Midzy NP  Unavailable         Unavailable

 

                    Lysius, B Midzy NP  Unavailable         Unavailable

 

                    Lysius, B Midzy NP  Unavailable         Unavailable

 

                    Lysius, B Midzy NP  Unavailable         Unavailable

 

                    Lysius, B Midzy NP  Unavailable         Unavailable

 

                    Lysius, B Midzy NP  Unavailable         Unavailable

 

                    Lysius, B Midzy NP  Unavailable         Unavailable

 

                    Lysius, B Midzy NP  Unavailable         Unavailable

 

                    Lysius, B Midzy NP  Unavailable         Unavailable

 

                    Lysius, B Midzy NP  Unavailable         Unavailable

 

                    Lysius, B Midzy NP  Unavailable         Unavailable

 

                    Lysius, B Midzy NP  Unavailable         Unavailable

 

                    Lysius, B Midzy NP  Unavailable         Unavailable

 

                    Lysius, B Midzy NP  Unavailable         Unavailable

 

                    Lysius, B Midzy NP  Unavailable         Unavailable

 

                    Lysius, B Midzy NP  Unavailable         Unavailable

 

                    Lysius, B Midzy NP  Unavailable         Unavailable

 

                    Ramon, L Sharri FNP Unavailable         Unavailable

 

                    [place], L Sharri FNP Unavailable         Unavailable

 

                    Ramon, L Sharri FNP Unavailable         Unavailable

 

                    Ramon, L Sharri FNP Unavailable         Unavailable

 

                    [place], L Sharri FNP Unavailable         Unavailable

 

                    Ramon, L Sharri FNP Unavailable         Unavailable

 

                    Ramon, L Sharri FNP Unavailable         Unavailable

 

                    Ramon, L Sharri FNP Unavailable         Unavailable

 

                    [place], L Sharri FNP Unavailable         Unavailable

 

                    Ramon, L Sharri FNP Unavailable         Unavailable

 

                    Ramon, L Sharri FNP Unavailable         Unavailable

 

                    [place], L Sharri FNP Unavailable         Unavailable

 

                    [place], L Sharri FNP Unavailable         Unavailable

 

                    [place], L Sharri FNP Unavailable         Unavailable

 

                    Ramon, L Sharri FNP Unavailable         Unavailable

 

                    Ramon, L Sharri FNP Unavailable         Unavailable

 

                    Ramon, L Sharri FNP Unavailable         Unavailable

 

                    [place], L Sharri FNP Unavailable         Unavailable

 

                    Ramon, L Sharri FNP Unavailable         Unavailable

 

                    Ramon, L Sharri FNP Unavailable         Unavailable

 

                    Ramon, L Sharri FNP Unavailable         Unavailable

 

                    Ramon, L Sharri FNP Unavailable         Unavailable

 

                    [place], L Sharri FNP Unavailable         Unavailable

 

                    Ramon, L Sharri FNP Unavailable         Unavailable

 

                    Ramon, L Sharri FNP Unavailable         Unavailable

 

                    [place], L Sharri FNP Unavailable         Unavailable

 

                    [place], L Sharri FNP Unavailable         Unavailable

 

                    Ramon, L Sharri FNP Unavailable         Unavailable

 

                    [place], L Sharri FNP Unavailable         Unavailable

 

                    [place], L Sharri FNP Unavailable         Unavailable

 

                    [place], L Sharri FNP Unavailable         Unavailable

 

                    [place], L Sharri FNP Unavailable         Unavailable

 

                    [place], L Sharri FNP Unavailable         Unavailable

 

                    [place], L Sharri FNP Unavailable         Unavailable

 

                    Ramon, L Sharri FNP Unavailable         Unavailable

 

                    Ramon, L Sharri FNP Unavailable         Unavailable

 

                    [place], L Sharri FNP Unavailable         Unavailable

 

                    Ramon, L Sharri FNP Unavailable         Unavailable

 

                    [place], L Sharri FNP Unavailable         Unavailable

 

                    Ramon, L Sharri FNP Unavailable         Unavailable

 

                    Salazar,  Salinas  Unavailable         [phone]

 

                    Salazar,  Salinas  Unavailable         [phone]

 

                    Salazar,  Salinas  Unavailable         [phone]

 

                    Salazar,  Salinas  Unavailable         [phone]

 

                    Salazar,  Salinas  Unavailable         [phone]

 

                    Salazar,  Salinas  Unavailable         [phone]

 

                    Salazar,  Salinas  Unavailable         [phone]

 

                    Salazar,  Salinas  Unavailable         [phone]

 

                    Salazar,  Salinas  Unavailable         [phone]

 

                    Salazar,  Salinas  Unavailable         [phone]

 

                    Salazar,  Salinas  Unavailable         [phone]

 

                    Salazar,  Salinas  Unavailable         [phone]

 

                    Salazar,  Salinas  Unavailable         [phone]

 

                    Salazar,  Salinas  Unavailable         [phone]

 

                    Salazar,  Salinas  Unavailable         [phone]

 

                    Salazar,  Salinas  Unavailable         [phone]

 

                    Salazar,  Salinas  Unavailable         [phone]

 

                    Salazar,  Salinas  Unavailable         [phone]

 

                    Salazar,  Salinas  Unavailable         [phone]

 

                    Salazar,  Salinas  Unavailable         [phone]

 

                    Salazar,  Salinas  Unavailable         [phone]

 

                    Salazar,  Salinas  Unavailable         [phone]

 

                    Salazar,  Salinas  Unavailable         [phone]

 

                    Salazar,  Salinas  Unavailable         [phone]

 

                    Salazar,  Salinas  Unavailable         [phone]

 

                    Salazar,  Salinas  Unavailable         [phone]

 

                    Salazar,  Salinas  Unavailable         [phone]

 

                    CHEO,  ZORYANA NP Unavailable         Unavailable

 

                    CHEO,  ZORYANA NP Unavailable         Unavailable

 

                    CHEO,  ZORYANA NP Unavailable         Unavailable

 

                    CHEO,  ZORYANA NP Unavailable         Unavailable

 

                    CHEO,  ZORYANA NP Unavailable         Unavailable

 

                    CHEO,  ZORYANA NP Unavailable         Unavailable

 

                    CHEO,  ZORYANA NP Unavailable         Unavailable

 

                    CHEO,  ZORYANA NP Unavailable         Unavailable

 

                    CHEO,  ZORYANA NP Unavailable         Unavailable

 

                    CHEO,  ZORYANA NP Unavailable         Unavailable

 

                    CHEO,  ZORYANA NP Unavailable         Unavailable

 

                    CHEO,  ZORYANA NP Unavailable         Unavailable

 

                    CHEO,  ZORYANA NP Unavailable         Unavailable

 

                    CHEO,  ZORYANA NP Unavailable         Unavailable

 

                    CHEO,  ZORYANA NP Unavailable         Unavailable

 

                    CHEO,  ZORYANA NP Unavailable         Unavailable

 

                    CHEO,  ZORYANA NP Unavailable         Unavailable

 

                    CHEO,  ZORYANA NP Unavailable         Unavailable

 

                    CHEO,  ZORYANA NP Unavailable         Unavailable

 

                    CHEO,  ZORYANA NP Unavailable         Unavailable

 

                    CHEO,  ZORYANA NP Unavailable         Unavailable

 

                    CHEO,  ZORYANA NP Unavailable         Unavailable

 

                    CHEO,  ZORYANA NP Unavailable         Unavailable

 

                    CHEO,  ZORYANA NP Unavailable         Unavailable

 

                    CHEO,  ZORYANA NP Unavailable         Unavailable

 

                    CHEO,  ZORYANA NP Unavailable         Unavailable

 

                    CHEO,  ZORYANA NP Unavailable         Unavailable

 

                    CHEO,  ZORYANA NP Unavailable         Unavailable

 

                    CHEO,  ZORYANA NP Unavailable         Unavailable

 

                    CHEO,  ZORYANA NP Unavailable         Unavailable

 

                    CHEO,  ZORYANA NP Unavailable         Unavailable

 

                    CHEO,  ZORYANA NP Unavailable         Unavailable

 

                    CHEO,  ZORYANA NP Unavailable         Unavailable

 

                    CHEO,  ZORYANA NP Unavailable         Unavailable

 

                    CHEO,  ZORYANA NP Unavailable         Unavailable

 

                    CHEO,  ZORYANA NP Unavailable         Unavailable

 

                    CHEO,  ZORYANA NP Unavailable         Unavailable

 

                    CHEO,  ZORYANA NP Unavailable         Unavailable



                                  



Re-disclosure Warning

          The records that you are about to access may contain information from 
federally-assisted alcohol or drug abuse programs. If such information is 
present, then the following federally mandated warning applies: This information
has been disclosed to you from records protected by federal confidentiality 
rules (42 CFR part 2). The federal rules prohibit you from making any further 
disclosure of this information unless further disclosure is expressly permitted 
by the written consent of the person to whom it pertains or as otherwise 
permitted by 42 CFR part 2. A general authorization for the release of medical 
or other information is NOT sufficient for this purpose. The Federal rules 
restrict any use of the information to criminally investigate or prosecute any 
alcohol or drug abuse patient.The records that you are about to access may 
contain highly sensitive health information, the redisclosure of which is 
protected by Article 27-F of the [facility] Public Health law. If you 
continue you may have access to information: Regarding HIV / AIDS; Provided by 
facilities licensed or operated by the [facility] Office of Mental Health; 
or Provided by the [facility] Office for People With Developmental 
Disabilities. If such information is present, then the following [place] 
mandated warning applies: This information has been disclosed to you from 
confidential records which are protected by state law. State law prohibits you 
from making any further disclosure of this information without the specific 
written consent of the person to whom it pertains, or as otherwise permitted by 
law. Any unauthorized further disclosure in violation of state law may result in
a fine or skilled nursing sentence or both. A general authorization for the release of 
medical or other information is NOT sufficient authorization for further disc
losure.                                                                         
    



Allergies and Adverse Reactions

          



           Type       Description Substance  Reaction   Status     Data Source(s

)

 

           Allergy to substance Allergy to substance Allergy to substance       

                [facility] ([facility])



                                                                                
       



Family History

          



             Family Member Name Family Member Gender Family Member Status Date o

f Status 

Description                             Data Source(s)

 

           Unknown    Male       Problem                          [facility] ([facility]

 

           Unknown    Male       Problem                          [facility] ([facility] )

 

                                        () 

 

           Unknown    Female     Problem                          [facility] ([facility]

 

           Unknown    Female     Problem                          [facility]

 

           Unknown    Female     Problem                          [facility]



                                                                                
                           



Encounters

          



           Encounter  Providers  Location   Date       Indications Data Source(s

)

 

                Outpatient      Attender: Raya Avelar PA-C Main Office   

  2021 08:15:00 AM 

EST                                                 [facility] ([facility]

 

                          Osmani Bundy MD: [address] [place] [id number]

[phone], [phone]. [phone] 

Attender: Osmani Bundy MD  [facility] 

2021 12:00:00 AM EST                           [facility] ([facility])

 

           Outpatient Attender: Teri Dickerson MD Main Office 2021 03:45:0

0 PM EST            

[facility] ([facility]

 

                Outpatient      Attender: ARABELLA BROWN NPReferrer: Teri Dickerson MD                 12/10/2020 

01:47:28 PM EST                                     [facility] 

[place]

 

                Outpatient      Attender: ARABELLA BROWN NPReferrer: Teri Dickerson MD                 10/28/2020 

08:57:23 AM EDT                                     [facility]

 

                Outpatient      Attender: Sharri Navarro FNPReferrer: Teri langley MD                 10/09/2020 

04:06:53 PM EDT                                     [facility]

 

                Outpatient      Attender: ARABELLA BROWN NPReferrer: Teri Dickerson MD                 09/10/2020 

08:23:42 AM EDT                                     [facility]

 

                Outpatient      Attender: Valentina Ramirez NPReferrer: Teri vegas MD                 2020 

03:07:09 PM EDT                                     [facility]

 

                Outpatient      Attender: ARABELLA BROWN NPReferrer: Teri Dickerson MD                 07/10/2020 

10:05:06 AM EDT                                     [facility]

 

           Outpatient Attender: Teri Dickerson MD Main Office 2020 11:40:0

0 AM EDT            

[facility] ([facility]

 

                Outpatient      Attender: ARABELLA BROWN NPReferrer: Teri Dickerson MD                 2020 

08:27:02 AM EDT                                     [facility]

 

                Outpatient      Attender: ARABELLA BROWN NPReferrer: Teri Dickerson MD                 2020 

06:52:40 PM EDT                                     [facility]

 

                Outpatient      Attender: Ana Miller NPReferrer: Teri Lane ams, MD                 2020 

02:51:37 PM EDT                                     [facility]

 

                Outpatient      Attender: ARABELLA BROWN NPReferrer: Teri Dickerson MD                 2020 

12:20:32 PM EST                                     [facility]

 

                Recurring Patient Attender: ARABELLA BROWN NPReferrer: [place] cleary MD                 

2020 03:14:28 PM EST                           [facility]

 

                Outpatient      Attender: DAVE NAVARRO NPReferrer: Teri Lane ams, MD                 2020 

08:52:56 PM EST                                     [facility]

 

                Outpatient      Attender: ARABELLA BROWN NPReferrer: Teri Dickerson MD                 2020 

04:27:55 PM EST                                     [facility]

 

                Outpatient      Attender: Sharri Navarro FNPReferrer: [place] roy                 2019 

08:31:05 AM EST                                     [facility]



                                                                                
                                                                                
                                                                                
               



Immunizations

          



             Vaccine      Date         Status       Description  Data Source(s)

 

             New in .  IIV4 10/16/2020 07:37:00 AM EDT completed            

     [facility] ([facility]

 

             New in .  IIV4 2019 12:27:00 PM EST completed            

     [facility]



                                                                                
                 



Medications

          



          Medication Brand Name Start Date Product Form Dose      Route     Admi

nistrative 

Instructions Pharmacy Instructions Status     Indications Reaction   Description

 Data 

Source(s)

 

                    Ergocalciferol 85761 UNT Oral Capsule Vitamin D (Ergocalcife

rol) 2021 

12:00:00 AM EST               ORAL                 active                      M

[facility] ([facility]

 

           mg           2021 12:00:00 AM EST tablet    60           

       TAKE ONE TABLET BY MOUTH 

TWICE A DAY AS NEEDED FOR PAIN, MAXIMUM DAILY DOSE = TWO TABLETS TAKE ONE TABLET

BY MOUTH TWICE A DAY AS NEEDED FOR PAIN, MAXIMUM DAILY DOSE = TWO TABLETS SOLD: 

2021                                                      [facility]

 

          15 mg               2021 12:00:00 AM EST tablet    90           

       TAKE ONE TABLET BY MOUTH EVERY 8 

HOURS MAXIMUM DAILY DOSE = THREE TABLETS TAKE ONE TABLET BY MOUTH EVERY 8 HOURS 

MAXIMUM DAILY DOSE = THREE TABLETS SOLD: 2021                             

           [facility]

 

           mg           2020 12:00:00 AM EST tablet    60           

       TAKE ONE TABLET BY MOUTH 

TWICE A DAY AS NEEDED FOR PAIN MAXIMUM DAILY DOSE = 2 TAKE ONE TABLET BY MOUTH 

TWICE A DAY AS NEEDED FOR PAIN MAXIMUM DAILY DOSE = 2 SOLD: 2020          

                              

[facility]

 

          15 mg               2020 12:00:00 AM EST tablet extended release

 90                  TAKE ONE TABLET 

BY MOUTH EVERY 8 HOURS MAXIMUM DAILY DOSE = 3 TAKE ONE TABLET BY MOUTH EVERY 8 

HOURS MAXIMUM DAILY DOSE = 3 SOLD: 2020                                   

     [facility]

 

           mg           2020 12:00:00 AM EST tablet    90           

       TAKE ONE TABLET BY MOUTH 

THREE TIMES A DAY AS NEEDED FOR PAIN MAXIMUM DAILY DOSE = 3 TABLETS TAKE ONE 

TABLET BY MOUTH THREE TIMES A DAY AS NEEDED FOR PAIN MAXIMUM DAILY DOSE = 3 
TABLETS      SOLD: 2020                                        [facility]

s

 

          15 mg               2020 12:00:00 AM EST tablet extended release

 90                  TAKE ONE TABLET 

BY MOUTH EVERY 8 HOURS MAXIMUM DAILY DOSE = 3 TABLETS TAKE ONE TABLET BY MOUTH 

EVERY 8 HOURS MAXIMUM DAILY DOSE = 3 TABLETS SOLD: 2020                   

                     [facility]

 

           mg           10/09/2020 12:00:00 AM EDT tablet    90           

       TAKE ONE TABLET BY MOUTH 

THREE TIMES A DAY AS NEEDED FOR PAIN MAXIMUM DAILY DOSE = 3 TAKE ONE TABLET BY 

MOUTH THREE TIMES A DAY AS NEEDED FOR PAIN MAXIMUM DAILY DOSE = 3 SOLD: 

10/09/2020                                                      [facility]

 

          15 mg               10/09/2020 12:00:00 AM EDT tablet extended release

 90                  TAKE ONE TABLET 

BY MOUTH EVERY 8 HOURS MAXIMUM DAILY DOSE = 3 TAKE ONE TABLET BY MOUTH EVERY 8 

HOURS MAXIMUM DAILY DOSE = 3 SOLD: 10/09/2020                                   

     [facility]

 

           mg           2020 12:00:00 AM EDT tablet    90           

       TAKE ONE TABLET BY MOUTH 

THREE TIMES A DAY AS NEEDED FOR PAIN MAXIMUM DAILY DOSE = 3 TAKE ONE TABLET BY 

MOUTH THREE TIMES A DAY AS NEEDED FOR PAIN MAXIMUM DAILY DOSE = 3 SOLD: 

2020                                                      [facility]

 

                    Cyclobenzaprine hydrochloride 10 MG Oral Tablet CYCLOBENZAPR

INE HCL 2020 

12:00:00 AM EDT tablet          15                              TAKE ONE TABLET 

BY MOUTH THREE TIMES A DAY AS NEEDED

FOR SPASMS MAXIMUM DAILY DOSE = 3       TAKE ONE TABLET BY MOUTH THREE TIMES A D

AY AS 

NEEDED FOR SPASMS MAXIMUM DAILY DOSE = 3 SOLD: 2020                       

                 [facility]

 

          15 mg               2020 12:00:00 AM EDT tablet extended release

 90                  TAKE ONE TABLET 

BY MOUTH EVERY 8 HOURS MAXIMUM DAILY DOSE = 3 TAKE ONE TABLET BY MOUTH EVERY 8 

HOURS MAXIMUM DAILY DOSE = 3 SOLD: 2020                                   

     [facility]

 

           mg           2020 12:00:00 AM EDT tablet    90           

       TAKE ONE TABLET BY MOUTH 

THREE TIMES A DAY AS NEEDED FOR PAIN MAXIMUM DAILY DOSE = 3 TABLETS TAKE ONE 

TABLET BY MOUTH THREE TIMES A DAY AS NEEDED FOR PAIN MAXIMUM DAILY DOSE = 3 
TABLETS      SOLD: 2020                                        [facility]

s

 

          15 mg               2020 12:00:00 AM EDT tablet extended release

 90                  TAKE ONE TABLET 

BY MOUTH EVERY 8 HOURS MAXIMUM DAILY DOSE = 3 TABLETS TAKE ONE TABLET BY MOUTH 

EVERY 8 HOURS MAXIMUM DAILY DOSE = 3 TABLETS SOLD: 2020                   

                     [facility]

 

          15 mg               2020 12:00:00 AM EDT tablet extended release

 90                  TAKE ONE TABLET 

BY MOUTH EVERY 8 HOURS MAXIMUM DAILY DOSE = THREE TABLETS TAKE ONE TABLET BY 

MOUTH EVERY 8 HOURS MAXIMUM DAILY DOSE = THREE TABLETS SOLD: 2020         

                               

[facility]

 

           mg           2020 12:00:00 AM EDT tablet    90           

       TAKE ONE TABLET BY MOUTH 

THREE TIMES A DAY AS NEEDED MAXIMUM DAILY DOSE = THREE TABLETS TAKE ONE TABLET 

BY MOUTH THREE TIMES A DAY AS NEEDED MAXIMUM DAILY DOSE = THREE TABLETS SOLD: 

2020                                                      [facility]

 

           mg           2020 12:00:00 AM EDT tablet    90           

       TAKE ONE TABLET BY MOUTH 

THREE TIMES A DAY AS NEEDED FOR PAIN MAXIMUM DAILY DOSE = 3 TABLETS TAKE ONE 

TABLET BY MOUTH THREE TIMES A DAY AS NEEDED FOR PAIN MAXIMUM DAILY DOSE = 3 
TABLETS      SOLD: 2020                                        [facility]

s

 

          15 mg               2020 12:00:00 AM EDT tablet extended release

 90                  TAKE ONE TABLET 

BY MOUTH EVERY 8 HOURS MAXIMUM DAILY DOSE = 3 TABLETS TAKE ONE TABLET BY MOUTH 

EVERY 8 HOURS MAXIMUM DAILY DOSE = 3 TABLETS SOLD: 2020                   

                     [facility]

 

           mg           2020 12:00:00 AM EDT tablet    60           

       TAKE ONE TABLET BY MOUTH 

EVERY 12 HOURS AS NEEDED FOR PAIN MAXIMUM DAILY DOSE = 2 TAKE ONE TABLET BY 

MOUTH EVERY 12 HOURS AS NEEDED FOR PAIN MAXIMUM DAILY DOSE = 2 SOLD: 2020 

         

                                                            [facility]

 

          15 mg               2020 12:00:00 AM EDT tablet extended release

 90                  TAKE ONE TABLET 

BY MOUTH EVERY 8 HOURS MAXIMUM DAILY DOSE = 3 TAKE ONE TABLET BY MOUTH EVERY 8 

HOURS MAXIMUM DAILY DOSE = 3 SOLD: 2020                                   

     [facility]

 

           mg           2020 12:00:00 AM EDT tablet    60           

       TAKE ONE TABLET BY MOUTH 

EVERY 12 HOURS AS NEEDED FOR PAIN MAXIMUM DAILY DOSE = 2 TABLETS TAKE ONE TABLET

BY MOUTH EVERY 12 HOURS AS NEEDED FOR PAIN MAXIMUM DAILY DOSE = 2 TABLETS SOLD: 

2020                                                      [facility]

 

          15 mg               2020 12:00:00 AM EDT tablet extended release

 90                  TAKE ONE TABLET 

BY MOUTH EVERY 8 HOURS MAXIMUM DAILY DOSE = 3 TABLETS TAKE ONE TABLET BY MOUTH 

EVERY 8 HOURS MAXIMUM DAILY DOSE = 3 TABLETS SOLD: 2020                   

                     [facility]

 

          15 mg               2020 12:00:00 AM EST tablet extended release

 90                  TAKE ONE TABLET 

BY MOUTH EVERY 8 HOURS MAXIMUM DAILY DOSE = 3 TABLETS TAKE ONE TABLET BY MOUTH 

EVERY 8 HOURS MAXIMUM DAILY DOSE = 3 TABLETS SOLD: 2020                   

                     [facility]

 

           mg           2020 12:00:00 AM EST tablet    60           

       TAKE ONE TABLET BY MOUTH 

EVERY 12 HOURS AS NEEDED FOR PAIN MAXIMUM DAILY DOSE = 2 TABLETS TAKE ONE TABLET

BY MOUTH EVERY 12 HOURS AS NEEDED FOR PAIN MAXIMUM DAILY DOSE = 2 TABLETS SOLD: 

2020                                                      [facility]

 

          15 mg               2020 12:00:00 AM EST tablet extended release

 90                  TAKE ONE TABLET 

BY MOUTH EVERY 8 HOURS MAXIMUM DAILY DOSE = THREE TABLETS TAKE ONE TABLET BY 

MOUTH EVERY 8 HOURS MAXIMUM DAILY DOSE = THREE TABLETS SOLD: 2020         

                               

[facility]

 

           mg           2020 12:00:00 AM EST tablet    60           

       TAKE ONE TABLET BY MOUTH 

EVERY 12 HOURS AS NEEDED FOR PAIN MAXIMUM DAILY DOSE = TWO TABLETS TAKE ONE 

TABLET BY MOUTH EVERY 12 HOURS AS NEEDED FOR PAIN MAXIMUM DAILY DOSE = TWO 
TABLETS      SOLD: 2020                                        [facility]

s

 

           mg           2020 12:00:00 AM EST tablet    60           

       TAKE ONE TABLET BY MOUTH 

EVERY 12 HOURS AS NEEDED FOR PAIN MAXIMUM DAILY DOSE = 2 TABLETS TAKE ONE TABLET

BY MOUTH EVERY 12 HOURS AS NEEDED FOR PAIN MAXIMUM DAILY DOSE = 2 TABLETS SOLD: 

2020                                                      [facility]

 

          15 mg               2020 12:00:00 AM EST tablet extended release

 90                  TAKE ONE TABLET 

BY MOUTH EVERY 8 HOURS MAXIMUM DAILY DOSE = 3 TABLETS TAKE ONE TABLET BY MOUTH 

EVERY 8 HOURS MAXIMUM DAILY DOSE = 3 TABLETS SOLD: 2020                   

                     [facility]

 

           mg           2019 12:00:00 AM EST tablet    60           

       TAKE ONE TABLET BY MOUTH 

EVERY 12 HOURS AS NEEDED FOR PAIN MAXIMUM DAILY DOSE = 2 TAKE ONE TABLET BY 

MOUTH EVERY 12 HOURS AS NEEDED FOR PAIN MAXIMUM DAILY DOSE = 2 SOLD: 2019 

         

                                                            [facility]

 

          15 mg               2019 12:00:00 AM EST tablet extended release

 90                  TAKE ONE TABLET 

BY MOUTH EVERY 8 HOURS MAXIMUM DAILY DOSE = 3 TAKE ONE TABLET BY MOUTH EVERY 8 

HOURS MAXIMUM DAILY DOSE = 3 SOLD: 2019                                   

     [facility]



                                                                                
                                                                                
                                                                                
                                                                                
                                  



Insurance Providers

          



             Payer name   Policy type / Coverage type Policy ID    Covered party

 ID Covered 

party's relationship to lopez Policy Lopez             Plan Information

 

          [employer] [facility]           [id number]           [facility]                 [id number]

[id number]

 

          MEDICARE            [id number]                             [id number]

A

 

          MEDICARE  C         [id number]           S                   [id number]

[id number]

 

           FOR LIFE O         [id number]           S                   [id number]

[id number]

 

          Medicare  C         [id number]           SELF                [id number]

[id number]

 

           For Life F         [id number]           SELF                [id number]

[id number]

 

          Medicare Dme Medigap Part B [id number]           Self                

[id number]

 

           For Life Commercial [id number]           Family Dependent      

     [id number]

 

          Medicare  Medicare Primary [id number]           Self                [id number]

[id number]

 

           For Life Medigap Part B [id number]           Family Dependent  

         [id number]

 

          [place]are [place] [id number]           Self          

      [id number]

 

          DME Jurisdiction A [facility] C         [id number]           SELF          

      [id number]

 

          Medicare  C         [id number]           SELF                [id number]

[id number]

 

           For Life F         [id number]           SPOUSE              0

[id number]

 

          DME Jurisdiction A [facility] C         [id number]           SELF           

     [id number]

 

          Medicare  C         [id number]           SELF                [id number]

A

 

          Medicare Dme Medigap Part B [id number]           Self                

[id number]

 

           For Life Commercial [id number]           Family Dependent      

     [id number]

 

          Medicare Medicare Primary [id number]           Self                [id number]

[id number]

 

           For Life Medigap Part B [id number]           Family Dependent  

         [id number]

 

          Medicare [place] Medicare Primary [id number]           Self           

     [id number]

 

          Medicare  C         [id number]           SELF                [id number]

A

 

          Medicare Dme Medigap Part B [id number]           Self                

[id number]

 

           For Life Commercial [id number]           Family Dependent      

     [id number]

 

          Medicare  Medicare Primary [id number]           Self                [id number]

[id number]

 

           For Life Medigap Part B [id number]           Family Dependent  

         [id number]

 

          Medicare [place] Medicare Primary [id number]           Self           

     [id number]

 

          [employer]  For Life Medigap Part B [id number]           Family Depende

nt           [id number]

 

          Medicare [place]/[facility] Medicare Primary [id number]           Self       

         [id number]

 

           For Life Medigap Part B [id number]           Family Dependent  

         [id number]

 

          Medicare [place] Medicare Primary [id number]           Self           

     [id number]

 

           For Life Medigap Part B [id number]           Family Dependent  

         [id number]

 

          Medicare [place] Medicare Primary [id number]           Self           

     [id number]

 

          W[employer]  For Life Medigap Part B [id number]           Family Depende

nt           [id number]

 

          Medicare [place]/[employer] Medicare Primary [id number]           Self       

         [id number]

 

          MEDICARE  C         [id number]           S                   [id number]

A

 

          [facility] MEDICARE PART B C         [id number]           S               

    [id number]

 

          [facility] C         [id number]           S              

     [id number]

 

            [facility] [place]           [id number]           2          

       [id number]

 

          [employer]  [place]         [id number]           P               

    [id number]

 

          [employer]/ AD O         [id number]           P                  

 [id number]

 

           For Life Medigap Part B [id number]           Family Dependent  

         [id number]

 

          Medicare [place] Medicare Primary [id number]           Self           

     [id number]

 

           For Life Medigap Part B [id number]           Family Dependent  

         [id number]

 

          Medicare [place] Medicare Primary [id number]           Self           

     [id number]

 

           For Life Medigap Part B [id number]           Family Dependent  

         [id number]

 

          Medicare [place] Medicare Primary [id number]           Self           

     [id number]

 

           For Life Medigap Part B [id number]           Family Dependent  

         [id number]

 

          Medicare [place] Medicare Primary [id number]           Self           

     [id number]

 

                       [id number]           Spo                 [id number]

 

          MEDICARE            [id number]           Jayleen                 [id number]

A

 

           For Life Medigap Part B                     Family Dependent  

          

 

          Medicare [place] Medicare Primary                     Self            

     

 

           For Life F         [id number]           SPOUSE              [id number]

[id number]

 

          [employer] Medigap Part B                     Family Depe

ndent            

 

            [employer] [place] S         [id number]           P              

     [id number]

 

                              [id number]                               [id number]

A

 

                              [id number]                               [id number]



                                                                                
                                                                                
                                                                                
                                                                                
                                                                                
                                                                                
                                                                                
                                                                                
                    



Surgeries/Procedures

          



             Procedure    Description  Date         Indications  Data Source(s)

 

             Bone Mineral Density Test              2020 12:00:00 AM EDT  

            [place] ([facility]

 

                                        [facility] - Bone 

Dens.Study (Dexa),1 Or More-5 year f/u 

 

             Mammogram                 2020 12:00:00 AM EDT              [place] ([facility]

 

                                        Xray: 18 - Mammography, Bilateral 



                                                                                
                 



Results

          



                    ID                  Date                Data Source

 

                    [id number]          2021 12:00:00 AM EST [facility]

ology Imaging

 

                                        

________________________________________________________________________________

[facility] MD            Patient Name: YUSRA DIETZ [id number]         
          YOB: 1960[place]  [address]              Date of 
Exam: 1P#: [phone]: 
[phone]______________________________________________________________________

__________EXAM: CHEST (2 VIEW) X-RAYCLINICAL INFORMATION:   Dyspnea.Two views.  
These images were obtained using digital radiography.The latest prior for 
comparison 2013.The interstitial markings are diffusely increased.  This 
represents achange from the prior exam.  There is cardiomegaly which appears to 
haveincreased from the prior exam.  There has been interim development ofpatchy 
opacities in the left lower lobe silhouetting out a portion of thediaphragmatic 
surface of the left lung.  There is no significant change inthe appearance of 
the osseous structures.IMPRESSION:There evidence of cardiomegaly and 
interstitial edema with a left lowerlobe patchy opacity as described above.  The
opacity could represent eitherconcomitant pneumonia or asymmetric pulmonary 
edema.  This should becorrelated clinically with appropriate followup.Rasta Rosa, MEGA/Sandra you for referring YUSRA DIETZ to our office.   
Electronically Signed - RASTA ROSA DO  21 17:58   









          Name      Value     Range     Interpretation Code Description Data Alla

rce(s) Supporting 

Document(s)

 

                                                                       









                    ID                  Date                Data Source

 

                    [id number] 2021 12:01:00 PM 

EST [facility] ([facility])









          Name      Value     Range     Interpretation Code Description Data Alla

rce(s) Supporting 

Document(s)

 

           sars-cov-2 negative   negative   normal     Sars-cov-2 [place] ([facility])                           









                    ID                  Date                Data Source

 

                    [id number]               2021 11:56:00 AM EST [facility]









          Name      Value     Range     Interpretation Code Description Data Alla

rce(s) Supporting 

Document(s)

 

                                        SARS coronavirus 2 RdRp gene [Presence] 

in Respiratory specimen by JOE with 

probe detection Not detected                                  [facility]      

 

                                        This lab was ordered by [facility] and reported by [facility]. 









                    ID                  Date                Data Source

 

                    [id number]         2020 04:56:00 PM EST [facility]

 

                                        PATIENT MR#:   [id number] NAME:  Yusra Sauer MDATE OF BIRTH: 

1960REFERRING PHYSICIAN: SOS OutsideEXAM DATE:        2020EXAM: 
LUMBAR SPINE MRI WITHOUT CONTRASTINDICATION: Pain in the neck, mid back and low 
back, chronic..COMPARISON: MRI 1/15/2016TECHNIQUE: MRI scan of the lumbar spine 
was performed on a 1.5T GE Scanner usingvarious scan planes and pulse sequences.
 FINDINGS: The lumbar vertebral bodies are normal in height. No fracture 
ordestructive bone lesion. No lesion seen in the conus.  Benign hemangiomata 
T12and L1 vertebral bodies unchanged.T12-L1: Mild disc bulge.  Moderate anterior
 endplate osteophytes.  1 mmretrolisthesis.  Mild thecal sac compression.  
Slightly worse.L1-2: 2 mm retrolisthesis.  Mild disc bulge.  Mild thecal sac 
compression. Foramina are patent.  Unchanged.L2-3: Unremarkable.L3-4: Broad-
based left central disc protrusion.  Mild posterior displacementleft L4 nerve 
root.  Moderate facet arthropathy and ligamentum flavumhypertrophy. Progressive 
mild to moderate canal stenosis.  Mild stenosis of thelateral recesses and 
foramina.L4-5: Severe facet arthropathy.  Moderate ligamentum flavum 
hypertrophy.  3 mmanterolisthesis.  Disc is uncovered and bulging.  Mild canal 
stenosis.  Mildnarrowing of the lateral recesses and foramina bilaterally.  
Unchanged.L5-S1: Severe facet arthropathy.  4 mm anterolisthesis.  Mild disc 
bulge.  Nocanal stenosis.  Mild bilateral foraminal stenosis.  These findings 
haveprogressed.IMPRESSION:1.  L3-4 left central disc protrusion with mild mass-
effect on the left L4 nerveroot.  This is mildly larger.  There is also 
progressive mild to moderate canalstenosis.2.  L4-5 unchanged severe facet 
arthropathy, grade 1 anterolisthesis and mildstenosis.3.  L5-S1 progressive 
severe facet arthropathy and grade 1 anterolisthesis. Mild bilateral foraminal 
stenosis.Read by:        ROSA GONZALEZTranscribed by: ROSA GONZALEZTranscribed Date: 2020 4:56:21 PMElectronically signed by: ROSA GONZALEZDate signed: 2020 4:57:47 PM 









          Name      Value     Range     Interpretation Code Description Data Alla

rce(s) Supporting 

Document(s)

 

                                                                       









                    ID                  Date                Data Source

 

                    [id number]         2020 04:58:00 PM EST [facility]

 

                                        PATIENT MR#:   [id number] NAME:  Yusra Sauer MDATE OF BIRTH: 

1960REFERRING PHYSICIAN: SOS OutsideEXAM DATE:        2020EXAM: MR 
Thoracic Spine without contrastINDICATION: Thoracic back painCOMPARISON: No 
comparison availableTECHNIQUE: Multiplanar multisequence MR imaging of the 
thoracic spine wasperformed on a 1.5T St[employer] Friedberg MR unit using various scan planes and 
pulse sequences. Intravenous contrast was not administered.FINDINGS: 3 mm grade 
1 anterolisthesis of C7 on T1 and minimal grade 1anterolisthesis T1 on T2.  The 
thoracic kyphosis is preserved.  The vertebralbody heights are normal.  No 
compression deformity or lumbar fracture.There is a nonaggressive hyperintense 
T1 and T2 signal void 24 mm T12 vertebralbody hemangioma.  Heterogeneous 
appearance of the T2 vertebral body likelyrepresent a large underlying T2 
hemangioma.  Indeterminate 14 mm intermediate T1and T2 signal ovoid T8 vertebral
 body lesion.  Further evaluation andcharacterization with whole body nuclear 
medicine bone scan is advised toexclude aggressive etiology.Multilevel chronic 
thoracic disc desiccation, with multilevel mild to moderatedegenerative ventral 
endplate osteophytosis.The thoracic cord is normal in signal and morphology.  
The conus is normal andterminates at L1 superior endplate level.T1-2: Shallow 
dorsal disc bulge mildly flattens the ventral thecal sac.  Nocentral canal 
stenosis.  Mild left and moderate right foraminal stenosisbilateral facet 
arthropathy.T2-3: Shallow dorsal disc bulge.  No central canal or foraminal 
stenosis.T9-10: Ligament of flavum thickening and bilateral facet arthropathy p
artiallyefface the dorsal CSF signal although no significant central canal 
stenosis orlateral recess narrowing.  No foraminal stenosis.T10-11: Right 
greater than left facet arthropathy with asymmetric ligament theplane thickening
 effaces the dorsal CSF signal and mildly abuts and slightlyflatten the right 
dorsal cord.  Mild central canal stenosis.  No foraminalnarrowing.T11-12: 
Ligament of flavum thickening and bilateral facet arthropathy effacesthe dorsal 
CSF signal with mild to moderate central canal stenosis.  Mild dorsalcord 
abutment and slight dorsal cord flattening are seen.  Moderate leftforaminal 
stenosis.T12-L1: Mild diffuse disc bulge flattens the ventral thecal sac with 
mildluminal thickening and bilateral facet arthropathy.  Mild central 
canalstenosis.  No foraminal narrowing.No paraspinal soft tissue of 
normality.IMPRESSION: 1.  Mild multilevel thoracic spondylosis with mild T12-L1 
disc bulge and minimaldisc bulges at T1-2 and T2-3.  Mild T12-L1 central canal 
narrowing.  Multilevelligamentum flavum thickening and facet arthropathy 
contribute to additional [id number] canal narrowing and mild to moderate T11-12 
central canal stenosis, withmild dorsal cord flattening and abutment.2.  Mild to
 moderate T1-2 bilateral foraminal stenosis.  Moderate left [id number] 
stenosis.3.  Indeterminate intermediate signal T8 vertebral body intramedullary 
lesion. Further characterization and evaluation with whole body nuclear medicine
 bonescan is advised to exclude aggressive etiology.Read by:        Supa 
FedorsTranscribed by: Supa  FedorsTranscribed Date: 2020 4:58:31 
PMElectronically signed by: Supa MenendezorsDate signed: 2020 4:59:39 PM 









          Name      Value     Range     Interpretation Code Description Data Alla

rce(s) Supporting 

Document(s)

 

                                                                       









                    ID                  Date                Data Source

 

                    [id number]         2020 03:55:00 PM EST [facility]

 

                                        PATIENT MR#:   [id number] NAME:  Yusra Sauer MDATE OF BIRTH: 

1960REFERRING PHYSICIAN: SOS OutsideEXAM DATE:        2020EXAM: MRI 
CERVICAL SPINE INDICATION: C5-6 fusion 2015.  Chronic neck pain, mid back 
pain and lowerback pain.COMPARISON: X-rays 2015, MRI 2015TECHNIQUE: MRI
 scan was performed on a 1.5T GE Scanner using various scan planesand pulse 
sequences.FINDINGS: The visualized vertebral bodies are normal in height.  No 
acutefracture benign hemangioma T2 vertebral body unchanged.  C2-3: Moderate 
facet arthropathy.  No disc herniation or stenosis.C3-4: Mild broad-based 
central disc protrusion.  Moderate thecal saccompression.  Mild indentation left
 ventral spinal cord.  This is mildly worse. Severe right facet arthropathy with
 fusion of the facet joint.  Unchanged.C4-5: Central disc protrusion.  Bilateral
 uncinate osteophytes.  Moderate facetarthropathy.  Moderate indentation of the 
ventral spinal cord.  CSF iscompletely effaced around the cord.  Severe bilater
al foraminal stenosis.  Thesefindings are worse.  The cord compression is 
new.C5-6: Interval fusion.  Central bony ridge with bilateral uncinate 
osteophytes. Moderate thecal sac compression with mild indentation of the 
ventral spinalcord.  There is a thin rim of CSF around the posterior cord.  The 
degree of cordcompression and stenosis has improved compared to the previous 
study.  Severebilateral foraminal stenosis unchanged.  Small focus of increased 
T2 signal inthe left lateral spinal cord getting myelomalacia.  This is 
unchanged.C6-7: Disc bulge with bilateral disc osteophyte complexes.  Mild fac
etarthropathy.  Mild canal stenosis.  No cord compression.  Moderate 
bilateralforaminal stenosis.C7-T1: Moderate facet arthropathy.  2 mm 
anterolisthesis.  Canal and foraminaare patent.  These findings are mildly 
worse.IMPRESSION: 1.  C3-4 broad-based left central disc protrusion with mild 
cord compression. Worse.2.  C4-5 progressive central disc protrusion and facet 
arthropathy withworsening stenosis and cord compression.3.  C5-6 discectomy and 
fusion.  Stenosis and cord compression improved. Unchanged severe bilateral 
foraminal stenosis and subtle myelomalacia.4.  C6-7 unchanged mild canal stenosi
s and moderate foraminal stenosis.Read by:        ROSA GONZALEZTranscribed 
by: ROSA GONZALEZTranscribed Date: 2020 3:55:33 PMElectronically signed
 by: ROSA GONZALEZDate signed: 2020 3:55:52 PM 









          Name      Value     Range     Interpretation Code Description Data Alla

rce(s) Supporting 

Document(s)

 

                                                                       









                    ID                  Date                Data Source

 

                    [id number]            12/10/2020 01:47:28 PM EST [facility] PCName: Kandis DoshisDOB: 1960Provider: Loulou Brown: 2020 Chief ComplaintPatient presents with low back pain  Chief 
Complaint 2Neck and mid back pain, too. [facility] VAS PAIN Established:  MA 
completing section: shall CNA   History of Present IllnessDo you have a Brace 
for your condition? Patient does not have a brace for their condition. Do you 
have a TENS Unit for your condition? Patient does not have a TENS Unit for their
 condition. Supplements: Patient is not currently taking any supplements. Nerve 
Conduction: Patient has had a Nerve Conduction Test. Recent test/procedures: 
Patient was asked and denies having any tests since their last visit. Patient 
was asked and denies being seen by any Physicians since their last visit. The 
patient was last seen by a [facility] provider on 10/27/2020. 
At today's visit patient presents with their Self Implanted Devices The patient 
does not have any implanted devices. The patient does not have a glucose 
monitoring device.  Patient is not currently working. What was patient's 
previous occupation? . The patient is being seen for a post block
 examination.       Pain Duration: Years   Pain Quality: (Nociceptive)  sharp 
Timing:  intermittent. Progression:  improving Palliation:  block, changing 
position, opioid analgesics, rest and resting/laying down Exacerbating:  lifting
 Pain Score:  a current pain level of 5/10, a minimum pain level of 4/10 and a 
maximum pain level of 9/10. Condition type: The patient is being seen for a 
chronic condition. PAIN LOCATION:  the pain is located in the neck,  radiates to
 the left shoulder, left elbow, left forearm, left wrist, left hand and middle 
finger finger, the pain is greater on the left side more than the right, the 
pain is located in the low back ,  (weakness , achy and throbbing pain) radiates
 to the right buttock, left buttock, right hip, left hip, right thigh, right 
knee, right calf/shin, right ankle, right big toe and right pinky toe, the pain 
is greater on the right side more than the left, the pain is in the leg equally 
with the back, the pain is located in the mid-back ,  radiates to the right side
 of the ribs and left side of the ribs and the pain is greater on the left side 
more than the right. The etiology of this injury/condition is unknown. 
RELATIONSHIP TO INJURY: This condition is not related to a specific injury. 
INJURY MECHANISM: The injury resulted from  no known physical event. REVIEW OF 
PAST DIAGNOSTICS: have included:  MRI and electromyography/nerve conduction 
studies . Records were obtained, reviewed and on file. PAST TREATMENT has 
included:  NONSTEROIDAL ANTI-INFLAMMATORY drugs (not effective) Includes Mobic.,
 but ANTICONVULSANTS (effective) Includes Lyrica., OPIOID ANALGESICS (effective)
 Includes. hydrocodone does not work. fentanyl patch seems to be working well., 
cervical surgery 2015 (effective). - Radio Frequency Pertinent Information:
 On a RADIO FREQUENCY ABLATION of the BILATERAL, LUMBAR FACET JOINTS under 
fluoroscopy as confirmed by previous successful medial branch nerve blocks. 
Targeting the.  80 % of pain relief was provided which is ongoing. Allows 
patient to increase activity and functionality Including the following 
activities for longer periods of time with less pain: activities of daily 
living, better sleep, walking, standing, sitting and grocery shop. And to 
decrease the following pain medication(s): 11-, 2020 DR. RASHID. She 
is doing better overall the low back pain is lessened she can be more active. 
INTERVAL EVENTS: include . She states that the RF procedure helped the low back 
pain. She is having more mid back pain and it radiates into her ribs. She is now
 having a lot of mid back and some neck pain...she had neck surgery in 2015. She
 wants to know why the mid back is hurting her and has done no diagnostics on 
the neck for a long time. ASSOCIATED SYMPTOMS: include difficulty sleeping , 
difficulty walking, muscle pain/spasm, radiating, extremity weakness:  left, lo
wer extremity, right. and urinary incontinence, but no fecal incontinence. 
FUNCTIONAL LIMITATIONS: The patient's functional status is limited as follows: 
ability to  perform activities of daily living, participate in aerobic activity,
 participate in hobbies, perform housework and maintain normal sleep pattern. 
MEDICATION SIDE EFFECTS experienced by patient are:  drowsiness.   Review of 
SystemsConstitutional: Normal. Eyes: Normal. ENT: normal. Cardiovascular: 
Normal. Respiratory: Normal. Gastrointestinal: Normal. Genitourinary: Normal. 
Musculoskeletal: lower back pain, midback pain, neck pain, upper back pain, limb
 pain and joint pain. Integumentary: Normal. Neurological: Normal. Psychiatric: 
Normal. Endocrine: Normal. Hematologic/Lymphatic: Normal.   Patient maintains at
 today's visit there has been no change in his/her hematologic history.   I 
reviewed the above with the patient and I feel the ROS to be negative/normal 
other than mid and low back pain, neck, limb and joint pain.   Active Problems 
1. Chronic hip pain, left (719.45,338.29) (M25.552,G89.29) 2. Chronic low back 
pain (724.2,338.29) (M54.5,G89.29) 3. Facet arthropathy, lumbar (721.3) 
(M47.816) 4. History of depression (V11.8) (Z86.59) 5. History of fibromyalgia 
(V13.59) (Z87.39) 6. History of osteoarthritis (V13.4) (Z87.39) 7. Long term 
current use of opiate analgesic (V58.69) (Z79.891) 8. Lumbar radiculopathy 
(724.4) (M54.16) 9. Lumbar spondylosis (721.3) (M47.816) 10. Muscle spasm of 
back (724.8) (M62.830) 11. Osteoarthritis of hip (715.95) (M16.9) 12. Primary 
osteoarthritis of hip (715.15) (M16.10) 13. Spondylolisthesis, lumbar region 
(738.4) (M43.16) Allergies  Morphine Derivatives Itching; Updated By: 
Geno Zapata; 2018 3:14:52 PMonly iv morphineDenied   Adhesive Tape 
Recorded By: Alesha Hollis; 2016 12:56:08 PM  Iodinated Contrast Media 
Recorded By: Alesha Hollis; 2016 12:56:08 PM  Latex Recorded By: Alesha Hollis; 2016 12:56:08 PM Current Meds  Aleve-D Sinus  Cold TB12;Therapy: 
(Recorded:69Tus4574) to Recordedld 10/9/17 am  Aspirin  MG Oral Tablet 
Delayed Release; take one pill po every day;Therapy: 05Mdb1170 to 
(Evaluate:2018) Recordedself prescribed  Cyclobenzaprine HCl - 10 MG Oral 
Tablet; 1 po TID prn spasms MDD 3;Therapy: 22Wxy8526 to (Evaluate:11Gdf6014)  
Requested for: 66Bfh5422; LastRx:2020 Ordered  Lexapro 20 MG Oral 
Tablet;Therapy: (Recorded:22Dff8502) to Recorded  Magnesium Oxide 400 MG Oral 
Capsule;Therapy: 19Crl1693 to Recorded  Minocycline HCl CAPS;Therapy: 
(Recorded:10Wvd1713) to Recorded  Morphine Sulfate ER 15 MG Oral Tablet Extended
 Release; TAKE 1 TABLET EVERY 8HOURS MDD:3;Therapy: 16Fsp7707 to 
(Evaluate:67Esg3276)  Requested for: 2020; LastRx:2020 OrderedLD 
2020 3:00am  oxyCODONE-Acetaminophen  MG Oral Tablet; TAKE 1 TABLET 3
 times daily PRNPAIN MDD:3;Therapy: 40Kfl3159 to (Evaluate:2020)  Requested
 for: 2020; LastRx:2020 OrderedLD 2020 10:00am  Potassium 
Gluconate 595 (99 K) MG Oral Tablet;Therapy: (Recorded:2016) to Recorded  
Protonix 40 MG Oral Tablet Delayed Release;Therapy: 2018 to Recorded  
Wellbutrin TABS;Therapy: (Recorded:2018) to Recorded Past Medical History  
History of Chronic headaches (784.0) (R51)  Denied: History of blood coagulation
 disorder  History of degenerative disc disease (V13.59) (Z87.39)  History of 
depression (V11.8) (Z86.59)  History of fibromyalgia (V13.59) (Z87.39)  Assessed
 By: Arabella Brown (Pain Management); Last Assessed: 2017  History of 
hepatitis A virus infection (V12.09) (Z86.19)  History of kidney stones (V13.01)
 (Z87.442)  History of osteoarthritis (V13.4) (Z87.39)  Assessed By: Arabella Brown (Pain Management); Last Assessed: 2017  History of small bowel 
obstruction (V12.79) (Z87.19)    History of Not currently pregnant (V49.89) 
(Z78.9)  Denied: History of On anticoagulant therapy Surgical History  History 
of Appendectomy  3/2013  History of Back Surgery  2015    C5 disk replacement 
 History of Gastric Surgery For Morbid Obesity  2006  History of Hernia Repair  
2013  History of Hip Replacement Left  History of Hysterectomy  3/2004  
Denied: History of Implantable Cardioverter-Defibrillator  History of Knee 
Arthroplasty  History of Knee Replacement  2005  History of Knee Surgery  
2015  Denied: History of Pacemaker Placement  History of Total Hip Replacement
   Family History  Family history of arthritis (V17.7) (Z82.61)  Family 
history of cardiac disorder (V17.49) (Z82.49)  Family history of cardiac 
disorder (V17.49) (Z82.49)  Family history of diabetes mellitus (V18.0) (Z83.3) 
 Family history of malignant neoplasm (V16.9) (Z80.9) Social History  Current 
non-drinker of alcohol (V49.89) (Z78.9)  Denied: History of Drug use    
Never a smoker  Not currently employed VitalsVital Signs Recorded: 16Sxe8965 
03:33PM Height: 5 ft 7 inWeight: 215 lb BMI Calculated: 33.67BSA Calculated: 
2.09Systolic: 128, SittingDiastolic: 79, SittingHeart Rate: 117Respiration: 
16Temperature: 96.8 F, TympanicHeight measured w/wo shoes: w/shoesPain Scale: 5 
Physical ExamGeneral: The patient is a well nourished/well developed, female, 
heavy set, who is in no acute distress and appears stated age. Eyes: Lids are 
atraumatic, no lesions, sclerae are anicteric. Ears, Nose, Mouth, Throat: 
Patient wearing a mask due to COVID-19. Respiratory: Normal chest expansion and 
respiratory effort. Gait and Station: Gait was normal. Cardiovascular: 
Extremities without peripheral edema.Cervical Spine: Surgical incision, well 
healed surgical scar.- Palpation/Tenderness: Tenderness on palpation of the 
left: negative left paraspinal and negative left trapezius muscle. Tenderness on
 palpation of the RIGHT: negative right paraspinal and negative right trapezius 
muscle. - ROM:. Range of motion increases pain.Right Upper Extremity Motor: - 
Wrist: 5/5 flexion, 5/5 extension- Elbow: 5/5 flexion, 5/5 extension- Shoulder: 
5/5 Abduction, 5/5 Adduction- Hand  5/5 Left Upper Extremity Motor:- Wrist: 
5/5 flexion, 5/5 extension- Elbow: 5/5 flexion, 5/5 extension- Shoulder: 5/5 
Abduction, 5/5 Adduction- Hand  5/5 Neurological:Sensation Upper:. 
Cardiovascular: Extremities without peripheral edema.Thoracic Spine: Inspection:
 No deformity, ecchymosis, erythema or swelling noted.Palpation/Tenderness: 
Tenderness on palpation of the LEFT: paraspinal . Tenderness on palpation of the
 RIGHT: negative right paraspinal. ROM:  Range of motion increases pain. 
Flexion: was painful. Extension: was painful. Lumbosacral Spine: - Inspection: 
normal appearance. No deformity, ecchymosis, erythema or swelling noted. Normal 
Lordosis.. - Palpation/Tenderness: Tenderness on palpation of the LEFT: negative
 left paraspinal, negative left sciatic notch and negative left sacroiliac 
joint. - Palpation/Tenderness: Tenderness on palpation of the RIGHT: negative 
right paraspinal, negative right sciatic notch and negative right sacroiliac 
joint. - ROM Flexion: was not restricted, was painless. - ROM Extension: was not
 restricted, was painless.Right Lower Extremity Motor:- Hip: 5/5 flexion, 5/5 
extension- Knee: 5/5 flexion, 5/5 extension- Foot: 5/5 Plantar , 5/5 
DorsiFlexionSpecial Tests: flip test was negative, negative facet challenge and 
negative faberLeft Lower Extremity Motor:- Hip: 5/5 flexion, 5/5 extension- 
Knee: 5/5 flexion, 5/5 extension- Foot: 5/5 Plantar , 5/5 DorsiFlexionSpecial 
Tests: flip test was negative, negative facet challenge and positive 
faberNeurological:. Skin: Warm, dry, acyanotic. Psychological: Alert and 
oriented to person, place and time. Mood and affect are pleasant and 
appropriate. Judgement intact. Insight normal without delusions or 
hallucinations.  Denies suicidal/homicidal ideation.   Results/DataThis patient 
had a urine drug screen on 10-9-2020. Results are not in compliance. Patient 
counselled on not over taking short acting pain meds for exacerbation of pain   
Assessment 1. Back pain, thoracic (724.1) (M54.6) 2. Thoracic radiculopathy 
(724.4) (M54.14) 3. Chronic low back pain (724.2,338.29) (M54.5,G89.29) 4. 
Lumbar radiculopathy (724.4) (M54.16) 5. Neck pain (723.1) (M54.2) Plan 1. 
Changed: From  oxyCODONE-Acetaminophen  MG Oral Tablet take 1po  twice 
daily prn pain MDD:2 To oxyCODONE-Acetaminophen  MG Oral Tablet take 1po 
twice daily prn pain MDD:2LD 2020 10:00am 2. Renew: Morphine Sulfate ER 15 
MG Oral Tablet Extended Release; TAKE 1 TABLET EVERY 8 HOURS MDD:3LD 2020 
3:00am 3. MRI ([facility]) Referral Treatment  Treatment  Status: Hold For - 
Scheduling  Requested for: 67Rud4066Mtwttkg Type 3 : Private (No Auth needed)MRI
 Body Part 3 : C-SpineRequest Type 2 : Private (No Auth needed)Laterality (Left,
 Right, Bilateral) 2 : _NAMRI Body Part 2 : T-SpineDoes Patient have SCS...? : 
NoFront Desk Reminder: : Schedule FMRI within 7 days with Pt's MLP on fileIs 
this to rule out a mass? : NoHas the patient had Lumbar surgery? : NoContrast : 
Without ContrastRequest Type : Private (No Auth needed)MRI Reminder : Ins may 
require patient to have 4-6 wks of recent/documented PT on    body partMRI Body 
Part : L-SpineIs this for a MILD MRI...? : No 4. 30 Day RX Management  Follow-up
  Status: Hold For - Scheduling  Requested for: 2020). Schedule (FUP) 60 
days from today: : 2020). Is an additional appointment needed....? : Yes). 
Schedule 30 day RX from TODAY's date (2 days +/- either way): : 2021 In my 
opinion based on subjective and objective findings from today's visit the 
patient is improving. Medication:.  [place]  Information:  was consulted by my
 designee and I have reviewed the information presented to me and find no 
aberrant compliance issues. Patient has been informed.  Controlled Substance 
Prescribed: MORPHINE ER, PERCOCET . CONTROLLED SUBSTANCE INFORMATION: I advised 
the patient today/previously regarding treatment with the above controlled 
substance and/or narcotic. The patient was then informed of the risks, benefits,
 and alternatives of the narcotic. The risks discussed included but were not 
limited to physical and/or psychological dependence, tolerance of the 
medication, drowsiness, sleepiness, balance/coordination problems, confusion, 
allergic reaction or any other abnormal symptoms. The patient was advised and 
agreed to use the medication only as prescribed, and not to drive, or operate 
heavy equipment or machinery. The patient understood and consented to both 
undergo a narcotic/opiate regimen and agreed to sign and comply with all of the 
[facility] terms of a controlled substance treatment 
and agreement. The patient is on the lowest possible dose of opioids. The 
patient denies adverse side effects and does not demonstrate any aberrant drug 
taking behaviors. The patient is able to perform ADL's  including the following 
activities for longer periods of time with less pain: activities of daily 
living, sleeping, walking, standing, sitting, grocery shopping . There are no 
changes in current medications at this visit. Patient instructed to continue 
current regimen.  The patient is on the lowest possible dose of opioids. The 
patient denies adverse side effects and does not demonstrate any aberrant drug 
taking behaviors. The patient is able to perform ADL's  including the following 
activities for longer periods of time with less pain: activities of daily 
living, sleeping, walking, standing, sitting, grocery shopping . The prescribed 
medications are medically necessary for pain management and rehabilitation. 
Treatment includes: PROCEDURE(S): Blocks/procedures are deferred until further 
diagnostic studies are complete THERAPIES: Therapy Treatment Plan: Deferred: All
 Therapies: Deferred: per patient request. DIAGNOSTIC STUDIES: Based on patients
 symptoms and physical exam findings, I am ordering the following diagnostic 
tests: - MRI Variance/Authorization Request: We are requesting authorization 
(Private) for a MRI - MRI: I am ordering an UPDATED MRI, of the CERVICAL SPINE, 
of the LUMBAR SPINE, of the THORACIC SPINE, the original MRI was done on LUMBAR 
MRI . I will call patient with results. FOLLOW UP: The patient should have a
 follow up 30 Day RX. CONTINUE TREATMENT: Yusra will continue with the 
following:. Yusra is participating in a home exercise program and is encouraged 
to continue. - : The patient was counseled on the following:  treatment 
plan and future treatment options.        Discussion/SummaryYusra is seen for low
 back pain. The low back is much improved after the lumbar facet RF.  She is ha
ving some mid back pain and is worried about past cervical surgery and wonders 
if anything might be deteriorating with this. I have requested updated MRI of 
her entire spine. She is able to reduce her pain meds now that the low back is 
better. She will be seen in a month.   Signatures Electronically signed by : 
Arabella Brown NP; Dec  9 2020  4:31PM EST                       (Author)  
Electronically signed by : Fabricio Camacho MD; Dec 10 2020  1:47PM EST             
          









          Name      Value     Range     Interpretation Code Description Data Alla

rce(s) Supporting 

Document(s)

 

                                                                       









                    ID                  Date                Data Source

 

                    [id number]       2020 12:00:00 PM EST [facility]









          Name      Value     Range     Interpretation Code Description Data Alla

rce(s) Supporting 

Document(s)

 

                                        SARS coronavirus 2 RNA [Presence] in Res

piratory specimen by JOE with probe 

detection                                              [facility]      

 

                                        This lab was ordered by [facility] and reported by 

[employer]. 









                    ID                  Date                Data Source

 

                    [id number]       2020 12:00:00 PM EST [facility]









          Name      Value     Range     Interpretation Code Description Data Alla

rce(s) Supporting 

Document(s)

 

                                        SARS coronavirus 2 RNA [Presence] in Res

piratory specimen by JOE with probe 

detection                                              [facility]      

 

                                        This lab was ordered by [facility] 

and reported by [employer]. 









                    ID                  Date                Data Source

 

                    [id number]            10/28/2020 08:57:23 AM EDT [facility]

e and Well[facility], PCName: Kandis DoshisDOB: 1960Provider: Loulou Brown: 10/27/2020 Chief ComplaintPatient presents with neck and low back pain
  Chief Complaint 2[place] VAS PAIN Established:  MA completing section: BHAVESHrictye CMA
   History of Present IllnessDo you have a Brace for your condition? Patient 
does not have a brace for their condition. Do you have a TENS Unit for your 
condition? Patient does not have a TENS Unit for their condition. Supplements: 
Patient is not currently taking any supplements. Nerve Conduction: Patient has 
had a Nerve Conduction Test. Recent test/procedures: Patient was asked and 
denies having any tests since their last visit. Patient was asked and denies 
being seen by any Physicians since their last visit. The patient was last seen 
by a [facility] provider on 10/09/2020. At today's visit 
patient presents with their Self Implanted Devices The patient does not have any
 implanted devices. The patient does not have a glucose monitoring device.  
Patient is not currently working. The patient is being seen for a follow-up.    
   Pain Duration: 35 years   Pain Quality: (Nociceptive)  aching, dull and sharp
 Timing:  constant Progression:  worsening Palliation:  block, changing 
position, non-opioid analgesics and opioid analgesics Exacerbating:  sitting, 
standing and walking Pain Score:  a current pain level of 7/10, a minimum pain 
level of 5/10 and a maximum pain level of 10/10. Condition type: The patient is 
being seen for a chronic condition. PAIN LOCATION:  the pain is located in the 
low back ,  (weakness , achy and throbbing pain) radiates to the right buttock, 
left buttock, right hip, left hip, right thigh, right knee, right calf/shin, 
right ankle, right big toe and right pinky toe, the pain is greater on the right
 side more than the left and the pain is in the leg equally with the back. The 
etiology of this injury/condition is unknown. RELATIONSHIP TO INJURY: This 
condition is not related to a specific injury. INJURY MECHANISM: The injury 
resulted from  no known physical event. REVIEW OF PAST DIAGNOSTICS: have 
included:  MRI and electromyography/nerve conduction studies . Records were 
obtained, reviewed and on file. PAST TREATMENT has included:  NONSTEROIDAL ANTI-
INFLAMMATORY drugs (not effective) Includes Mobic., but ANTICONVULSANTS 
(effective) Includes Lyrica., OPIOID ANALGESICS (effective) Includes. 
hydrocodone does not work. fentanyl patch seems to be working well., cervical 
surgery 2015 (effective). - Radio Frequency Pertinent Information: On a 
RADIO FREQUENCY ABLATION of the BILATERAL, LUMBAR FACET JOINTS under fluoroscopy
 as confirmed by previous successful medial branch nerve blocks. Targeting the. 
 80 % of pain relief was provided lasting for 10 month(s). Allows patient to 
increase activity and functionality Including the following activities for 
longer periods of time with less pain: activities of daily living, better sleep,
 walking, standing, sitting and grocery shop. And to decrease the following pain
 medication(s): 19, 19 DR. RASHID. Good reduction of pain in the low 
back and able to walk better. This has complete worn off and she would like to 
get it repeated. INTERVAL EVENTS: include . She is having a lot of pain and does
 admit to needing more pain meds so she did run out of the short acting pain 
meds. She was counseled today about this and will do a block to get on top of 
this. ASSOCIATED SYMPTOMS: include difficulty sleeping , difficulty walking, 
muscle pain/spasm, radiating, extremity weakness:  left, lower extremity, right.
 and urinary incontinence, but no fecal incontinence. FUNCTIONAL LIMITATIONS: 
The patient's functional status is limited as follows: ability to  perform 
activities of daily living, participate in aerobic activity, participate in 
hobbies, perform housework and maintain normal sleep pattern. MEDICATION SIDE 
EFFECTS experienced by patient are:  drowsiness.   Review of 
SystemsConstitutional: Normal. Eyes: Normal. ENT: normal. Cardiovascular: 
Normal. Respiratory: Normal. Gastrointestinal: Normal. Genitourinary: Normal. 
Musculoskeletal: lower back pain, joint pain and limb pain. Integumentary: 
Normal. Neurological: Normal. Psychiatric: Normal. Endocrine: Normal. Hema
tologic/Lymphatic: Normal.   Patient maintains at today's visit there has been 
no change in his/her hematologic history.   I reviewed the above with the 
patient and I feel the ROS to be negative/normal other than low back and joint 
pain.   Active Problems 1. Chronic hip pain, left (472.45,338.29) 
(M25.552,G89.29) 2. Chronic low back pain (724.2,338.29) (M54.5,G89.29) 3. Facet
 arthropathy, lumbar (721.3) (M47.816) 4. History of depression (V11.8) (Z86.59)
 5. History of fibromyalgia (V13.59) (Z87.39) 6. History of osteoarthritis 
(V13.4) (Z87.39) 7. Long term current use of opiate analgesic (V58.69) (Z79.891)
 8. Lumbar radiculopathy (724.4) (M54.16) 9. Lumbar spondylosis (721.3) 
(M47.816) 10. Muscle spasm of back (724.8) (M62.830) 11. Osteoarthritis of hip 
(715.95) (M16.9) 12. Primary osteoarthritis of hip (715.15) (M16.10) 13. 
Spondylolisthesis, lumbar region (738.4) (M43.16) Allergies  Morphine 
Derivatives Itching; Updated By: Geno Zapata; 2018 3:14:52 PMonly iv 
morphineDenied   Adhesive Tape Recorded By: Alesha Hollis; 2016 12:56:08 
PM  Iodinated Contrast Media Recorded By: Alesha Hollis; 2016 12:56:08 PM 
 Latex Recorded By: Alesha Hollis; 2016 12:56:08 PM Current 
Meds<OBX.5.1><OBX.5.1.1>  Aleve-D Sinus </OBX.5.1.1><OBX.5.1.2> Cold 
TB12;</OBX.5.1.2></OBX.5.1>Therapy: (Recorded:2017) to Recordedld 10/9/17 
am  Aspirin  MG Oral Tablet Delayed Release; take one pill po every 
day;Therapy: 2018 to (Evaluate:2018) Recordedself prescribed  
Cyclobenzaprine HCl - 10 MG Oral Tablet; 1 po TID prn spasms MDD 3;Therapy: 
22Ljm2831 to (Evaluate:97Ngk5628)  Requested for: 79Hwo2579; LastRx:73Mzf3794 
Ordered  Lexapro 20 MG Oral Tablet;Therapy: (Recorded:61Zus8845) to Recorded  
Magnesium Oxide 400 MG Oral Capsule;Therapy: 97Ahq5702 to Recorded  Minocycline 
HCl CAPS;Therapy: (Recorded:19Ihp1914) to Recorded  Morphine Sulfate ER 15 MG 
Oral Tablet Extended Release; TAKE 1 TABLET EVERY 8HOURS MDD:3;Therapy: 
50Auw5183 to (Evaluate:2020)  Requested for: 2020; LastRx:48Aof4594 
OrderedLD 10/27/2020  oxyCODONE-Acetaminophen  MG Oral Tablet; TAKE 1 
TABLET 3 times daily PRNPAIN MDD:3;Therapy: 28Swj6621 to (Evaluate:2020)  
Requested for: 2020; LastRx:54Msk3825 OrderedLD 10/27/2020  Potassium 
Gluconate 595 (99 K) MG Oral Tablet;Therapy: (Recorded:2016) to Recorded  
Protonix 40 MG Oral Tablet Delayed Release;Therapy: 2018 to Recorded  
Wellbutrin TABS;Therapy: (Recorded:2018) to Recorded Past Medical History  
History of Chronic headaches (784.0) (R51)  Denied: History of blood coagulation
 disorder  History of degenerative disc disease (V13.59) (Z87.39)  History of 
depression (V11.8) (Z86.59)  History of fibromyalgia (V13.59) (Z87.39)  Assessed
 By: Arabella Brown (Pain Management); Last Assessed: 2017  History of 
hepatitis A virus infection (V12.09) (Z86.19)  History of kidney stones (V13.01)
 (Z87.442)  History of osteoarthritis (V13.4) (Z87.39)  Assessed By: Arabella Brown (Pain Management); Last Assessed: 2017  History of small bowel 
obstruction (V12.79) (Z87.19)    History of Not currently pregnant (V49.89) 
(Z78.9)  Denied: History of On anticoagulant therapy Surgical History  History 
of Appendectomy  3/2013  History of Back Surgery  2015    C5 disk replacement 
 History of Gastric Surgery For Morbid Obesity  2006  History of Hernia Repair  
2013  History of Hip Replacement Left  History of Hysterectomy  3/2004  
Denied: History of Implantable Cardioverter-Defibrillator  History of Knee 
Arthroplasty  History of Knee Replacement  2005  History of Knee Surgery  
2015  Denied: History of Pacemaker Placement  History of Total Hip Replacement
   Family History  Family history of arthritis (V17.7) (Z82.61)  Family 
history of cardiac disorder (V17.49) (Z82.49)  Family history of cardiac dis
order (V17.49) (Z82.49)  Family history of diabetes mellitus (V18.0) (Z83.3)  
Family history of malignant neoplasm (V16.9) (Z80.9) Social History  Current 
non-drinker of alcohol (V49.89) (Z78.9)  Denied: History of Drug use    
Never a smoker  Not currently employed VitalsVital Signs Recorded: 42Gyj5822 
03:18PM Height: 5 ft 6 inWeight: 214 lb BMI Calculated: 34.54BSA Calculated: 
2.06Systolic: 132, SittingDiastolic: 90, SittingHeart Rate: 73Respiration: 
16Temperature: 97.4 FPain Scale: 7 Physical ExamGeneral: female, heavy set, who 
is in mild distress and appears stated age. Eyes: Lids are atraumatic, no 
lesions, sclerae are anicteric. Ears, Nose, Mouth, Throat: Patient wearing a 
mask due to COVID-19. Respiratory: Normal chest expansion and respiratory 
effort. Gait and Station: Gait was normal. Cardiovascular: Extremities without 
peripheral edema.Lumbosacral Spine: - Palpation/Tenderness: Tenderness on 
palpation of the LEFT: paraspinal , but negative left sciatic notch and negative
 left sacroiliac joint. - Palpation/Tenderness: Tenderness on palpation of the 
RIGHT: paraspinal , but negative right sciatic notch and negative right 
sacroiliac joint. - ROM Flexion: was not restricted, was painless. - ROM 
Extension: was restricted, was painful.Right Lower Extremity Motor: - Hip: 5/5 
flexion, 5/5 extension- Knee: 5/5 flexion, 5/5 extension- Foot: 5/5 Plantar , 
5/5 DorsiFlexionLeft Lower Extremity Motor:- Hip: 5/5 flexion, 5/5 extension- 
Knee: 5/5 flexion, 5/5 extension- Foot: 5/5 Plantar , 5/5 
DorsiFlexionNeurological:Sensation Left Lower: normalSensation Right Lower: 
normal. Skin: Warm, dry, acyanotic. Psychological: Alert and oriented to person,
 place and time. Mood and affect are pleasant and appropriate. Judgement intact.
 Insight normal without delusions or hallucinations.  Denies suicidal/homicidal 
ideation.   Results/DataThis patient had a urine drug screen on . Results are 
not in compliance. She was counseled her pain was worse and she did take more of
 the oxycodone.   Assessment 1. Chronic low back pain (724.2,338.29) 
(M54.5,G89.29) 2. Facet arthropathy, lumbar (721.3) (M47.816) 3. Lumbar 
radiculopathy (724.4) (M54.16) 4. Lumbar spondylosis (721.3) (M47.816) Plan 1. 
Renew: Morphine Sulfate ER 15 MG Oral Tablet Extended Release; TAKE 1 TABLET 
EVERY 8 HOURS MDD:3LD 10/27/2020 2. Renew: oxyCODONE-Acetaminophen  MG 
Oral Tablet; TAKE 1 TABLET 3 times daily PRN PAIN MDD:3LD 10/27/2020 3. Block 
([facility]) Referral Procedure  Procedure  Status: Hold For - Scheduling  Requested 
for: [id number] Type : MedicareIs patient currently on a biologic? : NoIs 
patient taking Aspirin 325 mg or greater...? : NoIs your patient on an 
Anticoagulant -OR- have Coagulopathy...? : NoSedation : YesBlock Laterality : RF
 STEPHEN LT side first, then RT side in 1-2 weeks same MDArea : LumbarBlock : Medial
 Branch Radiofrequency (traditional thermal non-pulsed radiofrequency as    
deemed appropriate by the interventional physician)Professional  
needed for block? : NoFront Desk Reminder: : Schedule the Status Post BlockIs 
this an MVP patient (for RF's only)...........?? : No, this is not an MVP 
PatientIs this a RF on a Patient with a Pacemaker/ICD...?? : NoPt weight: SODS: 
</= 450 lbs. HODS: </= 400 lbs.  ENTER WEIGHT: : 214**IF** PT has 
Thrombocytopenia are platelets >/= 100,000 ? : NAAre you ordering pyhsical 
therapy...? : NoDoes patient require a Kenzie lift? : NoIs this an urgent 
request? : No - This is not an urgent requestCovid Risk : Low Risk Medication:. 
 [place]  Information:  was consulted by my designee and I have reviewed the 
information presented to me and find no aberrant compliance issues. Patient has 
been informed.  Controlled Substance Prescribed: MORPHINE ER, PERCOCET . 
CONTROLLED SUBSTANCE INFORMATION: I advised the patient today/previously rega
rding treatment with the above controlled substance and/or narcotic. The patient
 was then informed of the risks, benefits, and alternatives of the narcotic. The
 risks discussed included but were not limited to physical and/or psychological 
dependence, tolerance of the medication, drowsiness, sleepiness, 
balance/coordination problems, confusion, allergic reaction or any other 
abnormal symptoms. The patient was advised and agreed to use the medication only
 as prescribed, and not to drive, or operate heavy equipment or machinery. The 
patient understood and consented to both undergo a narcotic/opiate regimen and 
agreed to sign and comply with all of the [facility] 
terms of a controlled substance treatment and agreement. The patient is on the 
lowest possible dose of opioids. The patient denies adverse side effects and 
does not demonstrate any aberrant drug taking behaviors. The patient is able to 
perform ADL's  including the following activities for longer periods of time wit
h less pain: activities of daily living, sleeping, walking, standing, sitting, 
grocery shopping . There are no changes in current medications at this visit. 
Patient instructed to continue current regimen.  The patient is on the lowest 
possible dose of opioids. The patient denies adverse side effects and does not 
demonstrate any aberrant drug taking behaviors. The patient is able to perform 
ADL's  including the following activities for longer periods of time with less 
pain: activities of daily living, sleeping, walking, standing, sitting, grocery 
shopping . The prescribed medications are medically necessary for pain 
management and rehabilitation. Treatment includes: PROCEDURE(S):  ASIPP Risk 
Stratification of Patients presenting for Interventional Pain Procedures: 
Decreasing Morbidity of COVID-19 Comments: 60=1 PTComments: 0Comments: 
0Comments: 34.54=2Comments: 0Comments: 0Comments: HEP A 2 PTComments: 0Total 
Points: 5/15 According to the Covid-19 ASIPP guidelines and the medical history 
as relayed to me by the patient, the Covid-19 risk stratification is low .- 
Nerve Block Plan: I am ordering a, BILATERAL, LUMBAR, FACET JOINT MEDIAL BRANCH 
NERVE BLOCK. Definitive levels to be determined by the interventional physician 
based on fluoroscopic imaging and symptomatology at the time of the procedure. 
This procedure is , targeting the L3 L4 level, L4 L5 level, L5 S1 level and 
under fluoroscopy with SEDATION. NERVE BLOCK: The material risks, benefits, 
alternatives have been discussed with the patient, including no treatment. They 
include, but are not limited to, bleeding, bruising, infection, damage to 
targeted and non-targeted tissue, increased pain, nerve injury or other 
reaction, if severe, could lead to CVA, arrhythmias or death. The patient was 
given procedure instructions and educational material for this specific 
procedure at the time of the visit.- Bleeding Disorder: Patient denies having a 
bleeding disorder. - Anticoagulation therapy:. Patient denies current treatment 
with anti-coagulation therapy. RADIO FREQUENCY: Radio Frequency Pacer 
Information: Patient denies having a pacemaker / defibrillator.  NERVE BLOCK: 
The material risks, benefits, alternatives have been discussed with the patient,
 including no treatment. They include, but are not limited to, bleeding, 
bruising, infection, damage to targeted and non-targeted tissue, increased pain,
 nerve injury or other reaction, if severe, could lead to CVA, arrhythmias or 
death. The patient was given educational materials for this specific procedure 
at the time of the visit. The patient received a copy of our after care 
instructions.Radio Frequency Variance/Authorization Request We are requesting 
authorization (Private) for Radio Frequency procedure- Repeat Radio Frequency 
Detail I am ordering a, traditional thermal non-pulsed repeat RADIO FREQUENCY 
ABLATION of the LEFT and RIGHT, LUMBAR FACET JOINTS under fluoroscopy as done by
 previous radio frequency ablation at least 6 months ago as deemed appropriate 
by the interventional physician. The plan is to schedule the patient for the 
left side first, followed by the right side in 1 to 2 weeks. Targeting the L3 L4
 level, L4 L5 level and L5 S1 level. This procedure will be done under 
SEDATION.- Bleeding Disorder: Patient denies having a bleeding disorder. Patient
 Denies Current treatment with anti-coagulation therapy. Nerve Block: The 
material risks, benefits, alternatives have been discussed with the patient, 
including no treatment. They include, but are not limited to, bleeding, 
bruising, infection, damage to targeted and non-targeted tissue, increased pain,
 nerve injury or other reaction, if severe, could lead to CVA, arrhythmias or 
death. The patient was given educational materials at the time of the visit. The
 patient received a copy of our after care instructions.- Medical Necessity: RF 
Medical Necessity Criteria: Repeat Facet RF: The patient's pain is axial in 
nature (NOT radicular). There is no evidence of nerve root compression in past 
medical or diagnostic history in relation to this pain. It has been at least 6 
months and the original pain has returned. Patient has experienced >/= 50% of 
improvement of pain and improvement in specific ADL's documented for at least 6 
months.  The patient shows marked functional improvement in the following areas:
 ambulation , activities of daily living, sleep pattern, cognitive improvements,
 decrease in medication used for PRN pain relief and quality life improvements. 
Please refer to [facility] Physical Modality Improvement
 Questionnaire for details. THERAPIES: Therapy Treatment Plan: Deferred: All 
Therapies: Deferred: per patient request. FOLLOW UP: The patient should have a 
follow up visit 4 weeks post block. CONTINUE TREATMENT: Yusra will continue with 
the following:. Yusra is participating in a home exercise program and is 
encouraged to continue.        Discussion/SummaryYusra is seen for worsening low 
back pain. She is over due to get the RF procedure this was due some time ago. 
She has axial pain and pain with extension. She was counseled regarding her meds
 and will be seen back after the block. The meds are helpful. She has low covid 
risk and wishes to proceed.   Signatures Electronically signed by : Arabella Brown NP; Oct 27 2020  3:57PM EST                       (Author)  Electronically 
signed by : Sourav Shepherd MD; Oct 28 2020  8:57AM EST                       









          Name      Value     Range     Interpretation Code Description Data Alla

rce(s) Supporting 

Document(s)

 

                                                                       









                    ID                  Date                Data Source

 

                    [id number]            10/09/2020 04:06:53 PM EDT [facility]

[facility] PCName: Kandis kay ChildsDOB: 1960Provider: 

Gabriela Navarro: 10/09/2020 Chief Complaint 2 MA completing section: 
ASmithLPN   History of Present IllnessA Urine Drug Screen was ordered for Yusra Dietz and collected on site today 10/09/2020. Creatinine has been ordered as 
well for specimen validity, not for kidney function. Preliminary UDS results are
 not final and should not be used to determine patient care or plan of 
treatment. Initially a qualitative immunoassay screen will be done. Any positive
 findings or negative findings that are out of compliance will be further tested
 with a more comprehensive quantitative confirmation LCMS study. It is part of 
the normal prescribing protocol of controlled substances and is considered 
standard of care. The patient is being seen today for refill of prescriptions 
for controlled substances.      The date of onset of symptoms is approximately 
years.   Current Meds 1. Aleve-D Sinus  Cold TB12; Therapy: (Recorded:2017)
 to Recorded 2. Aspirin  MG Oral Tablet Delayed Release; take one pill po 
every day; Therapy: 84Rqs5209 to (Evaluate:2018) Recorded 3. 
Cyclobenzaprine HCl - 10 MG Oral Tablet; 1 po TID prn spasms MDD 3; Therapy: 
90Vyx5552 to (Evaluate:74Aps9666)  Requested for: 56Idq6993; Last Rx:71Etr0138 
Ordered 4. Lexapro 20 MG Oral Tablet; Therapy: (Recorded:92Jkd1191) to Recorded 
5. Magnesium Oxide 400 MG Oral Capsule; Therapy: 25Tzp8011 to Recorded 6. 
Minocycline HCl CAPS; Therapy: (Recorded:40Oys2168) to Recorded 7. Morphine 
Sulfate ER 15 MG Oral Tablet Extended Release; TAKE 1 TABLET EVERY 8 HOURS 
MDD:3; Therapy: 53Lqp6465 to (Evaluate:21Pok0044)  Requested for: 34Lrc3630; 
Last Rx:79Wek6379 Ordered 8. oxyCODONE-Acetaminophen  MG Oral Tablet; TAKE
 1 TABLET 3 times daily PRN PAIN MDD:3; Therapy: 61Qsr1128 to 
(Evaluate:08Sby8917)  Requested for: 41Guw0181; Last Rx:59Veh8608 Ordered 9. 
Potassium Gluconate 595 (99 K) MG Oral Tablet; Therapy: (Recorded:2016) to 
Recorded 10. Protonix 40 MG Oral Tablet Delayed Release;  Therapy: 2018 to 
Recorded 11. Wellbutrin TABS;  Therapy: (Recorded:2018) to Recorded 
Allergies  Morphine Derivatives Itching; Updated By: Geno Zapata; 
2018 3:14:52 PMonly iv morphineDenied   Adhesive Tape Recorded By: 
Alesha Hollis; 2016 12:56:08 PM  Iodinated Contrast Media Recorded By: 
Alesha Hollis; 2016 12:56:08 PM  Latex Recorded By: Alesha Hollis; 
2016 12:56:08 PM NotesNYSW 30 Day RX Note: Chief complaint: Patient is here 
today for 30 day refill of their controlled substance prescription Patient is 
being treated with chronic opioid therapy for CHRONIC LOW BACK PAIN.  Patient 
denies side effects related to chronic opioid use and has been re-educated 
regarding the controlled substance agreement. In my opinion patient continues to
 receive primary benefit with chronic opioid therapy. MORPHINE SULFATE ER, 
OXYCODONE . CONTROLLED SUBSTANCE INFORMATION: I advised the patient 
today/previously regarding treatment with the above controlled substance and/or 
narcotic. The patient was then informed of the risks, benefits, and alternatives
 of the narcotic. The risks discussed included but were not limited to physical 
and/or psychological dependence, tolerance of the medication, drowsiness, 
sleepiness, balance/coordination problems, confusion, allergic reaction or any 
other abnormal symptoms. The patient was advised and agreed to use the 
medication only as prescribed, and not to drive, or operate heavy equipment or 
machinery. The patient understood and consented to both undergo a 
narcotic/opiate regimen and agrees to comply with all of the [facility] terms of a controlled substance treatment and agreement.  Patient has 
received a copy of the Opioid instruction sheet.  I am giving the patient a 30 
day refill without changes. The patient consents to move forward with treatment.
 ...OPIOID ABUSE is a national epidemic that Physicians and other prescribers 
have the power to help prevent. In our opioid monitoring surveillance to help 
minimize misuse and diversion, moderate to high risk patients are required to 
have face to face 30 day refill of opioids. This will help minimize the pot
ential for electronic false requests, deterring potential fraudulent behavior. 
[facility]  Information:  was consulted by my designee and I have reviewed the 
information presented to me and find no aberrant compliance issues. Patient has 
been informed.    Physical ExamGeneral: The patient is a well nourished/well 
developed, female, who is obese, who is in no acute distress and appears stated 
age. Eyes: Lids are atraumatic, no lesions, sclerae are anicteric. Ears, Nose, 
Mouth, Throat: Patient wearing a mask due to COVID-19. Respiratory: Normal chest
 expansion and respiratory effort. Gait and Station: Gait was normal. Skin: 
Warm, dry, acyanotic. Psychological: Alert and oriented to person, place and 
time. Mood and affect are pleasant and appropriate. Judgement intact. Insight 
normal without delusions or hallucinations.  Denies suicidal/homicidal ideation.
   [facility]DS Results Detail Form: This patient had a urine 
drug screen on 2020. Results are in compliance.   Assessment 1. Chronic low
 back pain (724.2,338.29) (M54.5,G89.29) 2. Facet arthropathy, lumbar (721.3) 
(M47.816) 3. Long term current use of opiate analgesic (V58.69) (Z79.891) Plan 
1. Renew: Morphine Sulfate ER 15 MG Oral Tablet Extended Release; TAKE 1 TABLET 
EVERY 8 HOURS MDD:3LD10/2020 2. Renew: oxyCODONE-Acetaminophen  MG Oral 
Tablet; TAKE 1 TABLET 3 times daily PRN PAIN MDD:3LD 10/9/2020 3. UDS-LAB 
Medicare / Commercial ([facility] Urine Drug SCreen); [Do Not Release]; Specimen 
Source:Urine; Status:In Progress - Specimen/Data Collected;   Done: 2020 
[facility] Treatment Plan (2): UDS: This is an established patient and is on ongoing 
opioid therapy. A UDS is being obtained today, the patient has previously 
consented to UDS as part of the Controlled Substance and Treatment Agreement. 
The reason for today's UDS is: patient was selected at random and scored as 
moderate risk on the opioid risk assessment. Creatinine has been ordered as well
for specimen validity, not for kidney function. Preliminary UDS results are not 
final and should not be used to determine patient care or plan of treatment. 
Initially a qualitative immunoassay screen will be done. Any positive findings 
or negative findings that are out of compliance will be further tested with a 
more comprehensive quantitative confirmation LCMS study. It is part of the 
normal prescribing protocol of controlled substances and is considered standard 
of care.  Treatment includes: FOLLOW UP: The patient should have a follow up 
visit in 1 month.        Signatures Electronically signed by : FE Lr; Oct  9 2020  3:44PM EST                       (Author)  Electronically 
signed by : Laura Castro MD; Oct  9 2020  4:06PM EST                       









          Name      Value     Range     Interpretation Code Description Data Alla

rce(s) Supporting 

Document(s)

 

                                                                       









                    ID                  Date                Data Source

 

                    [id number]            09/10/2020 08:23:42 AM EDT [facility]

[facility] Well[facility] PCName: Kandis DoshisDOB: 1960Provider: Kevin 

MosheMAHOGANY: 2020 Chief ComplaintPatient presents with back pain  Chief 
Complaint 2[place] VAS PAIN Established:  MA completing section: shall CNA   
History of Present IllnessDo you have a Brace for your condition? Patient does 
not have a brace for their condition. Do you have a TENS Unit for your 
condition? Patient does not have a TENS Unit for their condition. Supplements: 
Patient is not currently taking any supplements. Nerve Conduction: Patient has 
had a Nerve Conduction Test. Recent test/procedures: Patient was asked and 
denies having any tests since their last visit. Patient was asked and denies 
being seen by any Physicians since their last visit. The patient was last seen 
by a [facility] provider on 2020. At today's visit patient
presents with their Self Patient is not currently working. What was patient's 
previous occupation? . The patient is being seen for a follow-up.
      Pain Duration: Years   Pain Quality: (Neuropathic)  burning, numbness and 
tingling Pain Quality: (Nociceptive)  aching, dull and sharp Timing:  constant 
Progression:  unchanged Palliation:  opioid analgesics, rest and resting/laying 
down Exacerbating:  standing and walking Pain Score:  a current pain level of 
6/10 and a minimum pain level of 4/10. Condition type: The patient is being seen
for a chronic condition. PAIN LOCATION:  the pain is located in the low back ,  
(weakness , achy and throbbing pain) radiates to the right buttock, left 
buttock, right hip, left hip, right thigh, right knee, right calf/shin, right 
ankle, right big toe and right pinky toe, the pain is greater on the right side 
more than the left and the pain is in the leg equally with the back. The 
etiology of this injury/condition is unknown. RELATIONSHIP TO INJURY: This 
condition is not related to a specific injury. INJURY MECHANISM: The injury 
resulted from  no known physical event. REVIEW OF PAST DIAGNOSTICS: have includ
ed:  MRI and electromyography/nerve conduction studies . Records were obtained, 
reviewed and on file. PAST TREATMENT has included:  NONSTEROIDAL ANTI-
INFLAMMATORY drugs (not effective) Includes Mobic., but ANTICONVULSANTS 
(effective) Includes Lyrica., OPIOID ANALGESICS (effective) Includes. 
hydrocodone does not work. fentanyl patch seems to be working well., cervical 
surgery 2015 (effective). - Radio Frequency Pertinent Information: On a 
RADIO FREQUENCY ABLATION of the BILATERAL, LUMBAR FACET JOINTS under fluoroscopy
as confirmed by previous successful medial branch nerve blocks. Targeting the.  
80 % of pain relief was provided which is ongoing. Allows patient to increase 
activity and functionality Including the following activities for longer periods
of time with less pain: activities of daily living, better sleep, walking, 
standing, sitting and grocery shop. And to decrease the following pain 
medication(s): 19, 19 DR. RASHID. Good reduction of pain in the low back
and able to walk better. INTERVAL EVENTS: include . She is still dealing with 
the low back pain...she is getting ready to have a block and it would be the RF 
procedure but she is just not yet ready.. She is still taking care of the grand 
kids and now with school and covid. She has had some episodes of bad muscle 
spasm. ASSOCIATED SYMPTOMS: include difficulty sleeping , difficulty walking, 
muscle pain/spasm, radiating, extremity weakness:  left, lower extremity, right.
and urinary incontinence, but no fecal incontinence. FUNCTIONAL LIMITATIONS: The
patient's functional status is limited as follows: ability to  perform 
activities of daily living, participate in aerobic activity, participate in 
hobbies, perform housework and maintain normal sleep pattern. MEDICATION SIDE 
EFFECTS experienced by patient are:  drowsiness.   Review of 
SystemsConstitutional: Normal. Eyes: Normal. ENT: normal. Cardiovascular: 
Normal. Respiratory: Normal. Gastrointestinal: Normal. Genitourinary: Normal. 
Musculoskeletal: lower back pain and joint pain. Integumentary: Normal. 
Neurological: Normal. Psychiatric: Normal. Endocrine: Normal. 
Hematologic/Lymphatic: Normal.   Patient maintains at today's visit there has 
been no change in his/her hematologic history.   I reviewed the above with the 
patient and I feel the ROS to be negative/normal other than low back and joint 
and at times upper thigh pain.   Active Problems 1. Chronic hip pain, left 
(719.45,338.29) (M25.552,G89.29) 2. Chronic low back pain (724.2,338.29) 
(M54.5,G89.29) 3. Facet arthropathy, lumbar (721.3) (M47.816) 4. History of 
depression (V11.8) (Z86.59) 5. History of fibromyalgia (V13.59) (Z87.39) 6. 
History of osteoarthritis (V13.4) (Z87.39) 7. Long term current use of opiate 
analgesic (V58.69) (Z79.891) 8. Lumbar radiculopathy (724.4) (M54.16) 9. Lumbar 
spondylosis (721.3) (M47.816) 10. Osteoarthritis of hip (715.95) (M16.9) 11. 
Primary osteoarthritis of hip (715.15) (M16.10) 12. Spondylolisthesis, lumbar 
region (738.4) (M43.16) Allergies  Morphine Derivatives Itching; Updated By: 
Geno Zapata; 2018 3:14:52 PMonly iv morphineDenied   Adhesive Tape 
Recorded By: Alesha Hollis; 2016 12:56:08 PM  Iodinated Contrast Media 
Recorded By: Alesha Hollis; 2016 12:56:08 PM  Latex Recorded By: Alesha Hollis; 2016 12:56:08 PM Current Meds  Aleve-D Sinus  Cold TB12;Therapy: 
(Recorded:2017) to Recordedld 10/9/17 am  Aspirin  MG Oral Tablet 
Delayed Release; take one pill po every day;Therapy: 41Est2494 to 
(Evaluate:2018) Recordedself prescribed  Lexapro 20 MG Oral Tablet;Therapy:
(Recorded:29Qjm6655) to Recorded  Magnesium Oxide 400 MG Oral Capsule;Therapy: 
36Mqw6816 to Recorded  Minocycline HCl CAPS;Therapy: (Recorded:82Qkh4791) to 
Recorded  Morphine Sulfate ER 15 MG Oral Tablet Extended Release; TAKE 1 TABLET 
EVERY 8HOURS MDD:3;Therapy: 92Cvj7636 to (Evaluate:18Cqb9645)  Requested for: 
2020; LastRx:[id number] OrderedLD 2020 10:00am  oxyCODONE-Acetaminophen 
 MG Oral Tablet; TAKE 1 TABLET 3 times daily PRNPAIN MDD:3;Therapy: 16Aug2
019 to (Evaluate:14Qgm5779)  Requested for: 2020; LastRx:[id number] 
OrderedLD 2020 10:30am  Potassium Gluconate 595 (99 K) MG Oral 
Tablet;Therapy: (Recorded:2016) to Recorded  Protonix 40 MG Oral Tablet 
Delayed Release;Therapy: 2018 to Recorded  Wellbutrin TABS;Therapy: 
(Recorded:2018) to Recorded Past Medical History  History of Chronic 
headaches (784.0) (R51)  Denied: History of blood coagulation disorder  History 
of degenerative disc disease (V13.59) (Z87.39)  History of depression (V11.8) 
(Z86.59)  History of fibromyalgia (V13.59) (Z87.39)  Assessed By: Arabella Brown 
(Pain Management); Last Assessed: 2017  History of hepatitis A virus 
infection (V12.09) (Z86.19)  History of kidney stones (V13.01) (Z87.442)  
History of osteoarthritis (V13.4) (Z87.39)  Assessed By: Arabella Brown (Pain 
Management); Last Assessed: 2017  History of small bowel obstruction 
(V12.79) (Z87.19)    History of Not currently pregnant (V49.89) (Z78.9)  
Denied: History of On anticoagulant therapy Surgical History  History of 
Appendectomy  3/2013  History of Back Surgery  2015    C5 disk replacement  
History of Gastric Surgery For Morbid Obesity    History of Hernia Repair  1
2013  History of Hip Replacement Left  History of Hysterectomy  3/2004  
Denied: History of Implantable Cardioverter-Defibrillator  History of Knee 
Arthroplasty  History of Knee Replacement  2005  History of Knee Surgery  
2015  Denied: History of Pacemaker Placement  History of Total Hip Replacement
  Family History  Family history of arthritis (V17.7) (Z82.61)  Family 
history of cardiac disorder (V17.49) (Z82.49)  Family history of cardiac 
disorder (V17.49) (Z82.49)  Family history of diabetes mellitus (V18.0) (Z83.3) 
Family history of malignant neoplasm (V16.9) (Z80.9) Social History  Current 
non-drinker of alcohol (V49.89) (Z78.9)  Denied: History of Drug use    
Never a smoker  Not currently employed VitalsVital Signs Recorded: 2020 
03:35PM Height: 5 ft 7 inWeight: 216 lb BMI Calculated: 33.83BSA Calculated: 
2.09Systolic: 126, SittingDiastolic: 78, SittingHeart Rate: 84Respiration: 
16Temperature: 97.6 F, TympanicHeight measured w/wo shoes: w/shoesPain Scale: 6 
Physical ExamGeneral: The patient is a well nourished/well developed, female, 
heavy set, who is in mild distress and appears stated age. Eyes: Lids are 
atraumatic, no lesions, sclerae are anicteric. Ears, Nose, Mouth, Throat: 
external ears and nose without trauma. Respiratory: Normal chest expansion and 
respiratory effort. Lumbosacral Spine: - Inspection: normal appearance. No 
deformity, ecchymosis, erythema or swelling noted. Normal Lordosis.. - 
Palpation/Tenderness: Tenderness on palpation of the LEFT: paraspinal  and 
sacroiliac joint, but negative left sciatic notch. - Palpation/Tenderness: 
Tenderness on palpation of the RIGHT: paraspinal  and sacroiliac joint, but 
negative right sciatic notch. - ROM Flexion: was not restricted, was painless. -
ROM Extension: was not restricted, was painful.Right Lower Extremity Motor:- 
Hip: 5/5 flexion, 5/5 extension- Knee: 5/5 flexion, 5/5 extension- Foot: 5/5 
Plantar , 5/5 DorsiFlexionSpecial Tests: flip test was negative, positive facet 
challenge  and positive faberLeft Lower Extremity Motor:- Hip: 5/5 flexion, 5/5 
extension- Knee: 5/5 flexion, 5/5 extension- Foot: 5/5 Plantar , 5/5 
DorsiFlexionSpecial Tests: flip test was negative, positive facet challenge  and
positive faberNeurological:Sensation Left Lower: normalSensation Right Lower: 
normal. Skin: Warm, dry, acyanotic. Psychological: Alert and oriented to person,
place and time. Mood and affect are pleasant and appropriate. Judgement intact. 
Insight normal without delusions or hallucinations.  Denies suicidal/homicidal 
ideation.   Results/DataThis patient had a urine drug screen on 2020. 
Results are in compliance.   Assessment 1. Muscle spasm of back (724.8) 
(M62.830) 2. Lumbar radiculopathy (724.4) (M54.16) 3. Lumbar spondylosis (721.3)
(M47.816) Plan 1. Start: Cyclobenzaprine HCl - 10 MG Oral Tablet; 1 po TID prn 
spasms MDD 3 2. Renew: Morphine Sulfate ER 15 MG Oral Tablet Extended Release; 
TAKE 1 TABLET EVERY 8 HOURS MDD:2020 10:00am 3. Renew: oxyCODONE-
Acetaminophen  MG Oral Tablet; TAKE 1 TABLET 3 times daily PRN PAIN 
MDD:2020 10:30am 4. Follow-up in 2 months Follow Up  Follow-up  Status: 
Hold For - Scheduling  Requested for:  of the day. glazaRamirezedesequiel Ap
pointment for 15 or 30 minutes : Schedule 15 minute appointment In my opinion 
based on subjective and objective findings from today's visit the patient is 
minimally changed. Medication:.  [place]  Information:  was consulted by my 
designee and I have reviewed the information presented to me and find no 
aberrant compliance issues. Patient has been informed.  General Medications 
Prescribed: CYCLOBENZEPRINE . GENERAL MEDICATIONS: I advised the patient 
today/previously regarding treatment with the above medication(s). The risks, 
benefits, common side effects and alternative treatments were discussed with the
patient. The provider verbalized with the patient. The patient verbalized 
understanding and was told to call if there were any untoward effects. 
Controlled Substance Prescribed: MORPHINE ER, OXYCODONE . CONTROLLED SUBSTANCE 
INFORMATION: I advised the patient today/previously regarding treatment with the
above controlled substance and/or narcotic. The patient was then informed of the
risks, benefits, and alternatives of the narcotic. The risks discussed included 
but were not limited to physical and/or psychological dependence, tolerance of 
the medication, drowsiness, sleepiness, balance/coordination problems, 
confusion, allergic reaction or any other abnormal symptoms. The patient was 
advised and agreed to use the medication only as prescribed, and not to drive, 
or operate heavy equipment or machinery. The patient understood and consented to
both undergo a narcotic/opiate regimen and agreed to sign and comply with all of
the [facility] terms of a controlled substance 
treatment and agreement. The patient is on the lowest possible dose of opioids. 
The patient denies adverse side effects and does not demonstrate any aberrant 
drug taking behaviors. The patient is able to perform ADL's  including the 
following activities for longer periods of time with less pain: activities of 
daily living, sleeping, walking, standing, sitting, grocery shopping . There are
no changes in current medications at this visit. Patient instructed to continue 
current regimen.  The patient is on the lowest possible dose of opioids. The 
patient denies adverse side effects and does not demonstrate any aberrant drug 
taking behaviors. The patient is able to perform ADL's  including the following 
activities for longer periods of time with less pain: activities of daily 
living, sleeping, walking, standing, sitting, grocery shopping . The prescribed 
medications are medically necessary for pain management and rehabilitation. 
Treatment includes: PROCEDURE(S): The patient defers blocks/procedures at this 
time RADIO FREQUENCY: (She is due to get this but defers at this time) 
THERAPIES: Therapy Treatment Plan: Deferred: All Therapies: Deferred: per 
patient request. FOLLOW UP: The patient should have a follow up 30 Day RX. 
CONTINUE TREATMENT: Yusra will continue with the following:. Yusra is 
participating in a home exercise program and is encouraged to continue. - 
: The patient was counseled on the following:  treatment plan and future 
treatment options.        Discussion/SummaryYusra is seen for her low back and 
thigh pain. She is over due to get the RF procedure but has been putting this 
off due to commitments related to caring for her grand kids. At this time she 
wants to hold off on any procedures and will be seen in a month.   Signatures 
Electronically signed by : Arabella Brown NP; Sep  8 2020  4:31PM EST             
         (Author)  Electronically signed by : Sonja Jones MD; Sep 10 2020  
8:23AM EST                       









          Name      Value     Range     Interpretation Code Description Data Alla

rce(s) Supporting 

Document(s)

 

                                                                       









                    ID                  Date                Data Source

 

                    [id number]            2020 08:43:28 AM EDT [facility]

e [facility] Well[facility] PCName: Kandis DoshisDOB: 1960Provider: Jemma Ramirez: 2020 









          Name      Value     Range     Interpretation Code Description Data Alla

rce(s) Supporting 

Document(s)

 

                                                                       









                    ID                  Date                Data Source

 

                    [id number]            2020 03:07:09 PM EDT [facility]

[facility] Wellness [facility]Name: Kandis DoshisDOB: 1960Provider: Jemma Ramirez: 2020 Chief Complaint 2MA completing section: shall CNA   History
of Present IllnessThe patient is being seen today for refill of prescriptions 
for controlled substances.      The date of onset of symptoms is approximately 
Years.   Current Meds 1. Aleve-D Sinus  Cold TB12; Therapy: (Recorded:2017)
to Recorded 2. Aspirin  MG Oral Tablet Delayed Release; take one pill po 
every day; Therapy: 60Ark6587 to (Evaluate:2018) Recorded 3. Lexapro 20 MG 
Oral Tablet; Therapy: (Recorded:02Jrp0132) to Recorded 4. Magnesium Oxide 400 MG
Oral Capsule; Therapy: 87Ffh8357 to Recorded 5. Minocycline HCl CAPS; Therapy: 
(Recorded:26Wuj9051) to Recorded 6. Morphine Sulfate ER 15 MG Oral Tablet 
Extended Release; TAKE 1 TABLET EVERY 8 HOURS MDD:3; Therapy: 19Cdm2350 to 
(Evaluate:37Pxe8623)  Requested for: 71Zpa3633; Last Rx:[id number] Ordered 7. 
oxyCODONE-Acetaminophen  MG Oral Tablet; TAKE 1 TABLET 3 times daily PRN 
PAIN MDD:3; Therapy: 86Hmf5039 to (Evaluate:93Pyp9916)  Requested for: 
22Axc3044; Last Rx:[id number] Ordered 8. Potassium Gluconate 595 (99 K) MG Oral T
ablet; Therapy: (Recorded:2016) to Recorded 9. Protonix 40 MG Oral Tablet 
Delayed Release; Therapy: 2018 to Recorded 10. Wellbutrin TABS;  Therapy: 
(Recorded:2018) to Recorded Allergies  Morphine Derivatives Itching; 
Updated By: Geno Zapata; 2018 3:14:52 PMonly iv morphineDenied   
Adhesive Tape Recorded By: Alesha Hollis; 2016 12:56:08 PM  Iodinated 
Contrast Media Recorded By: Alesha Hollis; 2016 12:56:08 PM  Latex 
Recorded By: Alesha Hollis; 2016 12:56:08 PM NotesNYSW 30 Day RX Note: 
Chief complaint: Patient is here today for 30 day refill of their controlled 
substance prescription Patient is being treated with chronic opioid therapy for 
back pain.  Patient denies side effects related to chronic opioid use and has 
been re-educated regarding the controlled substance agreement. In my opinion 
patient continues to receive primary benefit with chronic opioid therapy. 
MORPHINE AND OXYCODONE . CONTROLLED SUBSTANCE INFORMATION: I advised the patient
today/previously regarding treatment with the above controlled substance and/or 
narcotic. The patient was then informed of the risks, benefits, and alternatives
of the narcotic. The risks discussed included but were not limited to physical 
and/or psychological dependence, tolerance of the medication, drowsiness, 
sleepiness, balance/coordination problems, confusion, allergic reaction or any 
other abnormal symptoms. The patient was advised and agreed to use the 
medication only as prescribed, and not to drive, or operate heavy equipment or 
machinery. The patient understood and consented to both undergo a narcotic
/opiate regimen and agrees to comply with all of the [facility]
terms of a controlled substance treatment and agreement.  I am giving the 
patient a 30 day refill without changes. The patient consents to move forward 
with treatment. ...OPIOID ABUSE is a national epidemic that Physicians and other
prescribers have the power to help prevent. In our opioid monitoring 
surveillance to help minimize misuse and diversion, moderate to high risk 
patients are required to have face to face 30 day refill of opioids. This will 
help minimize the potential for electronic false requests, deterring potential 
fraudulent behavior. [facility]  Information:  was consulted by my designee and I
have reviewed the information presented to me and find no aberrant compliance 
issues. Patient has been informed.    Physical ExamGeneral: The patient is a 
well nourished/well developed, female, heavy set, who is in no acute distress 
and appears stated age. Eyes: Lids are atraumatic, no lesions, sclerae are anic
teric. Ears, Nose, Mouth, Throat: external ears and nose without trauma. 
Respiratory: Normal chest expansion and respiratory effort. Gait and Station: 
Gait was normal. Skin: Warm, dry, acyanotic. Psychological: Alert and oriented 
to person, place and time. Mood and affect are pleasant and appropriate. 
Judgement intact. Insight normal without delusions or hallucinations.  Denies 
suicidal/homicidal ideation.   Assessment 1. Chronic low back pain 
(724.2,338.29) (M54.5,G89.29) 2. Lumbar radiculopathy (724.4) (M54.16) 3. Lumbar
spondylosis (721.3) (M47.816) Plan 1. Renew: Morphine Sulfate ER 15 MG Oral Tab
let Extended Release; TAKE 1 TABLET EVERY 8 HOURS MDD:3LD 8/10/2020 6:30pm 2. 
Renew: oxyCODONE-Acetaminophen  MG Oral Tablet; TAKE 1 TABLET 3 times 
daily PRN PAIN MDD:32020 11:30pm 3. 30 Day RX Management  Follow-up  
Status: Hold For - Scheduling  Requested for: 2020). Is an additional 
appointment needed....? : No). Schedule 30 day RX from TODAY's date (2 days +/- 
either way): : 2020 [facility] Treatment Plan (2): UDS: This is an established 
patient and is on ongoing opioid therapy. A UDS is being obtained today, the 
patient has previously consented to UDS as part of the Controlled Substance and 
Treatment Agreement. The reason for today's UDS is: patient was selected at 
random and scored as moderate risk on the opioid risk assessment. Creatinine has
been ordered as well for specimen validity, not for kidney function. Preliminary
UDS results are not final and should not be used to determine patient care or 
plan of treatment. Initially a qualitative immunoassay screen will be done. Any 
positive findings or negative findings that are out of compliance will be 
further tested with a more comprehensive quantitative confirmation LCMS study. 
It is part of the normal prescribing protocol of controlled substances and is 
considered standard of care.         Signatures Electronically signed by : Valentina Ramirez NP; Aug 11 2020  2:59PM EST                       (Author)  
Electronically signed by : Sourav Shepherd MD; Aug 11 2020  3:07PM EST               
       









          Name      Value     Range     Interpretation Code Description Data Alla

rce(s) Supporting 

Document(s)

 

                                                                       









                    ID                  Date                Data Source

 

                    [id number]          2020 12:00:00 AM EDT [facility]

 

                                        ________________________________________

________________________Teri Dickerson MD                  Patient Name: YUSRA DIETZ [id number]                  
  Y[place]: 1960[place]  [address]                    Date of Exam:
2020#: [phone]: 
[phone]________________________________________________________________EXAM: 
MAMMO SCREENING  WITH CADCLINICAL INFORMATION:  Screening.Comparison 2019 as
well as other prior exams.Family history of mother with breast cancer at age 58,
grandmother withbreast cancer at age 60 and three cousins with breast 
cancer.Based on the personal health history and familial cancer history 
yourpatient supplied at the time of imaging, her lifetime risk of breast 
cancerestimated by the Tyrer-Cuzick model is 28.1%.  However, due to the unk
nowngene mutation status, there are limitations to the accuracy of this 
riskestimate.  Similarly, if anything changes in the personal and/or 
familyhistory this percentage could increase or decrease.  Currently, 
theNational Comprehensive Cancer Network and American Cancer Society 
recommendadjunctive breast MRI screening starting at age 30 for women with a> 
20-25% lifetime risk of developing breast cancer.Based on the personal and 
family history information your patient suppliedat the time of imaging, your 
patient meets the National ComprehensiveCancer Network testing criteria and 
elected to meet with our hereditarycancer specialist which may include genetic 
testing.  Results are pending.This test result could increase or decrease your 
patient's breast cancerrisk estimate.  Addendum to follow.Digital screening (2D)
mammography was performed bilaterally.Additionally, breast tomosynthesis (3D 
mammography) was performedbilaterally in  the CC and MLO projections and 
compared to the priorexam(s).There has been no change in appearance of the 
mammogram from studies.There is a mild amount of residual fibroglandular tissue 
remaining, whichis fairly symmetric.  There has been no interval development of 
dominantmasses, areas of structural distortion, or clusters of 
microcalcificationstypical of malignancy.  Scattered lymph nodes are seen in the
axilla.The Volpara volumetric breast density category is B, there are 
scatteredareas of fibroglandular density.IMPRESSION:BI-RADS Category 1 - 
Negative Mammogram.  Currently no mammographicevidence of malignancy.  Routine 
followup is recommended in one year.This mammogram was read with the assistance 
of [employer] PowerLook AMP, an FDAapproved computer aided detection system for 
mammography.Negative x-ray reports should not delay surgical consultation if a 
dominantor clinically suspicious mass is present.Not all breast cancers can be 
identified by mammography.  Therefore, werecommend that you continue to perform 
regular breast self-examination andphysical examination and then promptly 
contact your physician of anyconcerns or changes.Adenosis and dense breasts may 
obscure an underlying neoplasm.The patient states that a clinical breast 
examination was over a year ago. Given the elevated lifetime risk of breast 
cancer 20% or greater, yearlysupplemental screening MRI of the breasts is 
recommended in addition toannual screening mammography, staggered every 6 
months.BETY Loco/Sandra you for referring YUSRA DIETZ to our 
office.     Electronically Signed - ETHAN WEBB MD  20 18:18   









          Name      Value     Range     Interpretation Code Description Data Alla

rce(s) Supporting 

Document(s)

 

                                                                       









                    ID                  Date                Data Source

 

                    [id number]            07/10/2020 10:05:06 AM EDT [facility]

 

                                        [facility]Name: Kandis DoshisDOB: 1960Provider: Loulou Brown: 2020 Chief ComplaintPatient presents with low back and leg pain 
Chief Complaint 2Patient presents with left arm numbness [place] VAS PAIN 
Established:  MA completing section: ASmithLPN   History of Present IllnessA 
Urine Drug Screen was ordered for Yusra Dietz and collected on site today 
2020. Creatinine has been ordered as well for specimen validity, not for 
kidney function. Preliminary UDS results are not final and should not be used to
determine patient care or plan of treatment. Initially a qualitative immunoassay
screen will be done. Please note this is not a dip test. Any positive findings 
or negative findings that are out of compliance will be further tested with a 
more comprehensive quantitative confirmation LCMS study. It is part of the 
normal prescribing protocol of controlled substances and is considered standard 
of care. Do you have a Brace for your condition? Patient does not have a brace 
for their condition. Do you have a TENS Unit for your condition? Patient does 
not have a TENS Unit for their condition. Supplements: Patient is not currently 
taking any supplements. Nerve Conduction: Patient has had a Nerve Conduction 
Test. Recent test/procedures: Patient was asked and denies having any tests 
since their last visit. Patient was asked and denies being seen by any 
Physicians since their last visit. The patient was last seen by a [facility] provider on 2020. At today's visit patient presents with their 
Self Patient is not currently working. The patient is being seen for a follow-
up.       Pain Duration: years   Pain Quality: (Neuropathic)  burning Pain 
Quality: (Nociceptive)  aching and stabbing Timing:  constant Progression:  
unchanged Palliation:  block, changing position, opioid analgesics, rest and 
resting/laying down Exacerbating:  lifting, standing and walking Pain Score:  a 
current pain level of 7/10, a minimum pain level of 4/10 and a maximum pain 
level of 10/10. Condition type: The patient is being seen for a chronic 
condition. PAIN LOCATION:  the pain is located in the low back ,  (weakness , 
achy and throbbing pain) radiates to the right buttock, left buttock, right hip,
left hip, right thigh, right knee, right calf/shin, right ankle, right big toe 
and right pinky toe, the pain is greater on the right side more than the left 
and the pain is in the leg equally with the back. The etiology of this 
injury/condition is unknown. RELATIONSHIP TO INJURY: This condition is not 
related to a specific injury. INJURY MECHANISM: The injury resulted from  no 
known physical event. REVIEW OF PAST DIAGNOSTICS: have included:  MRI and 
electromyography/nerve conduction studies . Records were obtained, reviewed and 
on file. PAST TREATMENT has included:  NONSTEROIDAL ANTI-INFLAMMATORY drugs (not
effective) Includes Mobic., but ANTICONVULSANTS (effective) Includes Lyrica., 
OPIOID ANALGESICS (effective) Includes. hydrocodone does not work. fentanyl 
patch seems to be working well., cervical surgery 2015 (effective). - Radio
Frequency Pertinent Information: On a RADIO FREQUENCY ABLATION of the BILATERAL,
LUMBAR FACET JOINTS under fluoroscopy as confirmed by previous successful medial
branch nerve blocks. Targeting the.  80 % of pain relief was provided which is 
ongoing. Allows patient to increase activity and functionality Including the 
following activities for longer periods of time with less pain: activities of 
daily living, better sleep, walking, standing, sitting and grocery shop. And to 
decrease the following pain medication(s): 19, 19 DR. RASHID. Good 
reduction of pain in the low back and able to walk better. INTERVAL EVENTS: 
include . She is having more pain in low back and in her left arm with some 
numbness...she has been taking care of her grand babies and this has been 
especially stressful for her..she can not get a block until she knows what is 
happening with her grand kids because she has had to take care of them over the 
last couple of months. ASSOCIATED SYMPTOMS: include difficulty sleeping , 
difficulty walking, radiating, extremity weakness:  left, lower extremity, 
right. and urinary incontinence, but no fecal incontinence. FUNCTIONAL 
LIMITATIONS: The patient's functional status is limited as follows: ability to  
perform activities of daily living, participate in aerobic activity, participate
in hobbies, perform housework and maintain normal sleep pattern. MEDICATION SIDE
EFFECTS experienced by patient are:  drowsiness.   Review of 
SystemsConstitutional: Normal. Eyes: Normal. ENT: normal. Cardiovascular: 
Normal. Respiratory: Normal. Gastrointestinal: Normal. Genitourinary: Normal. 
Musculoskeletal: lower back pain, midback pain, neck pain, joint pain and limb 
pain. Integumentary: Normal. Neurological: numbness (in left arm). Psychiatric: 
Normal. Endocrine: Normal. Hematologic/Lymphatic: Normal.   Patient maintains at
today's visit there has been no change in his/her hematologic history.   I 
reviewed the above with the patient and I feel the ROS to be negative/normal 
other than neck, low back and joint pain.   Active Problems 1. Chronic hip pain,
left (719.45,338.29) (M25.552,G89.29) 2. Chronic low back pain (724.2,338.29) 
(M54.5,G89.29) 3. Facet arthropathy, lumbar (721.3) (M47.816) 4. History of 
depression (V11.8) (Z86.59) 5. History of fibromyalgia (V13.59) (Z87.39) 6. 
History of osteoarthritis (V13.4) (Z87.39) 7. Long term current use of opiate 
analgesic (V58.69) (Z79.891) 8. Lumbar radiculopathy (724.4) (M54.16) 9. Lumbar 
spondylosis (721.3) (M47.816) 10. Osteoarthritis of hip (715.95) (M16.9) 11. 
Primary osteoarthritis of hip (715.15) (M16.10) 12. Spondylolisthesis, lumbar 
region (738.4) (M43.16) Allergies  Morphine Derivatives Itching; Updated By: 
Geno Zapata; 2018 3:14:52 PMonly iv morphineDenied   Adhesive Tape 
Recorded By: Alesha Hollis; 2016 12:56:08 PM  Iodinated Contrast Media 
Recorded By: Alesha Hollis; 2016 12:56:08 PM  Latex Recorded By: Alesha Hollis; 2016 12:56:08 PM Current Meds  Aleve-D Sinus  Cold TB12;Therapy: 
(Recorded:2017) to Recordedld 10/9/17 am  Aspirin  MG Oral Tablet 
Delayed Release; take one pill po every day;Therapy: 16Mya2336 to 
(Evaluate:2018) Recordedself prescribed  Lexapro 20 MG Oral Tablet;Therapy:
(Recorded:47Ebi1317) to Recorded  Magnesium Oxide 400 MG Oral Capsule;Therapy: 
92Aim5389 to Recorded  Metrogel GEL;Therapy: (Recorded:86Akv1577) to Recorded  
Minocycline HCl CAPS;Therapy: (Recorded:71Hiy7433) to Recorded  Morphine Sulfate
ER 15 MG Oral Tablet Extended Release; TAKE 1 TABLET EVERY 8HOURS MDD:3;Therapy:
75Isu2909 to (Evaluate:08Hkf7024)  Requested for: 2020; LastRx:59Apw7661 
OrderedLD 2020  oxyCODONE-Acetaminophen  MG Oral Tablet; TAKE 1 TABLET
3 times daily PRNPAIN MDD:3;Therapy: 19Fmm6449 to (Evaluate:63Ynr4612)  
Requested for: 2020; LastRx:44Ghc0576 OrderedLD 2020  Potassium 
Gluconate 595 (99 K) MG Oral Tablet;Therapy: (Recorded:2016) to Recorded  
Protonix 40 MG Oral Tablet Delayed Release;Therapy: 2018 to Recorded  
Wellbutrin TABS;Therapy: (Recorded:2018) to Recorded Past Medical History  
History of Chronic headaches (784.0) (R51)  Denied: History of blood coagulation
disorder  History of degenerative disc disease (V13.59) (Z87.39)  History of 
depression (V11.8) (Z86.59)  History of fibromyalgia (V13.59) (Z87.39)  Assessed
By: Arabella Brown (Pain Management); Last Assessed: 2017  History of 
hepatitis A virus infection (V12.09) (Z86.19)  History of kidney stones (V13.01)
(Z87.442)  History of osteoarthritis (V13.4) (Z87.39)  Assessed By: Arabella Brown
(Pain Management); Last Assessed: 2017  History of small bowel 
obstruction (V12.79) (Z87.19)    History of Not currently pregnant (V49.89) 
(Z78.9)  Denied: History of On anticoagulant therapy Surgical History  History 
of Appendectomy  3/2013  History of Back Surgery  2015    C5 disk replacement 
History of Gastric Surgery For Morbid Obesity  2006  History of Hernia Repair  
2013  History of Hip Replacement Left  History of Hysterectomy  3/2004  
Denied: History of Implantable Cardioverter-Defibrillator  History of Knee 
Arthroplasty  History of Knee Replacement  2005  History of Knee Surgery  
2015  Denied: History of Pacemaker Placement  History of Total Hip Replacement
  Family History  Family history of arthritis (V17.7) (Z82.61)  Family 
history of cardiac disorder (V17.49) (Z82.49)  Family history of cardiac 
disorder (V17.49) (Z82.49)  Family history of diabetes mellitus (V18.0) (Z83.3) 
Family history of malignant neoplasm (V16.9) (Z80.9) Social History  Current 
non-drinker of alcohol (V49.89) (Z78.9)  Denied: History of Drug use    
Never a smoker  Not currently employed VitalsVital Signs Recorded: 06Deo8968 
03:39PM Height: 5 ft 6 inWeight: 212 lb BMI Calculated: 34.22BSA Calculated: 
2.05Systolic: 125, SittingDiastolic: 70, SittingHeart Rate: 61Respiration: 
16Temperature: 96.4 FHeight measured w/wo shoes: w/shoesPain Scale: 6 Physical 
ExamGeneral: The patient is a well nourished/well developed, female, heavy set, 
who is in mild distress and appears stated age. Eyes: Lids are atraumatic, no 
lesions, sclerae are anicteric. Ears, Nose, Mouth, Throat: external ears and 
nose without trauma. Respiratory: Normal chest expansion and respiratory effort.
Cervical Spine:- Palpation/Tenderness: Tenderness on palpation of the left: 
negative left paraspinal and negative left trapezius muscle. Tenderness on 
palpation of the RIGHT: negative right paraspinal and negative right trapezius 
muscle. - ROM:. Range of motion increases pain.Right Upper Extremity Motor: - 
Wrist: 5/5 flexion, 5/5 extension- Elbow: 5/5 flexion, 5/5 extension- Shoulder: 
5/5 Abduction, 5/5 Adduction- Hand  5/5 Left Upper Extremity Motor:- Wrist: 
5/5 flexion, 5/5 extension- Elbow: 5/5 flexion, 5/5 extension- Shoulder: 5/5 
Abduction, 5/5 Adduction- Hand  5/5 Neurological:Sensation Upper:Left Upper:
numbnesRight Upper: normal. Lumbosacral Spine: - Inspection: normal appearance. 
No deformity, ecchymosis, erythema or swelling noted. Normal Lordosis.. - 
Palpation/Tenderness: Tenderness on palpation of the LEFT: paraspinal  and 
sacroiliac joint, but negative left sciatic notch. - Palpation/Tenderness: 
Tenderness on palpation of the RIGHT: paraspinal  and sacroiliac joint, but 
negative right sciatic notch. - ROM Flexion: was painful. - ROM Extension: was 
painful.Right Lower Extremity Motor:- Hip: 4/4 flexion, 4/5 extension- Knee: 4/5
flexion, 4/5 extension- Foot: 5/5 Plantar , 5/5 DorsiFlexionSpecial Tests: flip 
test was negative, positive facet challenge  and positive faberLeft Lower 
Extremity Motor:- Hip: 4/5 flexion, 4/5 extension- Knee: 4/5 flexion, 4/5 
extension- Foot: 5/5 DorsiFlexionSpecial Tests: flip test was negative, positive
facet challenge  and positive faberNeurological:Sensation Left Lower: 
normalSensation Right Lower: normal. Skin: Warm, dry, acyanotic. Psychological: 
Alert and oriented to person, place and time. Mood and affect are pleasant and 
appropriate. Judgement intact. Insight normal without delusions or halluci
nations.  Denies suicidal/homicidal ideation.   Results/DataThis patient had a 
urine drug screen on 2020. Results are in compliance.   Assessment 1. 
Chronic low back pain (724.2,338.29) (M54.5,G89.29) 2. Facet arthropathy, lumbar
(721.3) (M47.816) 3. Lumbar radiculopathy (724.4) (M54.16) 4. Spondylolisthesis,
lumbar region (738.4) (M43.16) Plan 1. Renew: Morphine Sulfate ER 15 MG Oral 
Tablet Extended Release; TAKE 1 TABLET EVERY 8 HOURS MDD:3LD 2020 2. Renew: 
oxyCODONE-Acetaminophen  MG Oral Tablet; TAKE 1 TABLET 3 times daily PRN 
PAIN MDD:3LD 2020 3. UDS-LAB Medicare / Commercial ([facility] Urine Drug SCreen);
[Do Not Release]; Specimen Source:Urine; Status:In Progress - Specimen/Data 
Collected;   Done: 78Yss7293 4. 30 Day RX Management  Follow-up  Status: Hold 
For - Scheduling  Requested for: 38Ixp8069). Schedule (FUP) 60 days from today: 
: 15Oes7984). Is an additional appointment needed....? : Yes). Schedule 30 day 
RX from TODAY's date (2 days +/- either way): : 26Wbv9883 In my opinion based on
subjective and objective findings from today's visit the patient is worsening. 
Medication:.  [place]  Information:  was consulted by my designee and I have 
reviewed the information presented to me and find no aberrant compliance issues.
Patient has been informed.  Controlled Substance Prescribed: MORPHINE ER, 
PERCOCET . CONTROLLED SUBSTANCE INFORMATION: I advised the patient today/pr
eviously regarding treatment with the above controlled substance and/or 
narcotic. The patient was then informed of the risks, benefits, and alternatives
of the narcotic. The risks discussed included but were not limited to physical 
and/or psychological dependence, tolerance of the medication, drowsiness, 
sleepiness, balance/coordination problems, confusion, allergic reaction or any 
other abnormal symptoms. The patient was advised and agreed to use the 
medication only as prescribed, and not to drive, or operate heavy equipment or 
machinery. The patient understood and consented to both undergo a narco
tic/opiate regimen and agreed to sign and comply with all of the [facility] terms of a controlled substance treatment and agreement. 
The patient is on the lowest possible dose of opioids. The patient denies 
adverse side effects and does not demonstrate any aberrant drug taking 
behaviors. The patient is able to perform ADL's  including the following 
activities for longer periods of time with less pain: activities of daily 
living, sleeping, walking, standing, sitting, grocery shopping . There are no 
changes in current medications at this visit. Patient instructed to continue c
urrent regimen.  The patient is on the lowest possible dose of opioids. The 
patient denies adverse side effects and does not demonstrate any aberrant drug 
taking behaviors. The patient is able to perform ADL's  including the following 
activities for longer periods of time with less pain: activities of daily 
living, sleeping, walking, standing, sitting, grocery shopping . The prescribed 
medications are medically necessary for pain management and rehabilitation. UDS:
This is an established patient and is on ongoing opioid therapy. A UDS is being 
obtained today, the patient has previously consented to UDS as part of the 
Controlled Substance and Treatment Agreement. The reason for today's UDS is: 
patient was selected at random and scored as moderate risk on the opioid risk 
assessment. Creatinine has been ordered as well for specimen validity, not for 
kidney function. Preliminary UDS results are not final and should not be used to
determine patient care or plan of treatment. Initially a qualitative immunoassay
screen will be done. Any positive findings or negative findings that are out of 
compliance will be further tested with a more comprehensive quantitative 
confirmation LCMS study. It is part of the normal prescribing protocol of 
controlled substances and is considered standard of care.  Treatment includes: 
PROCEDURE(S): The patient defers blocks/procedures at this time THERAPIES: 
Therapy Treatment Plan: Deferred: All Therapies: Deferred: per patient request. 
EMG-NERVE CONDUCTION STUDY UPPER EXTREMITY:  (EMG RECOMMENDED SHE DEFERS AT THIS
TIME) FOLLOW UP: The patient should have a follow up 30 Day RX. CONTINUE 
TREATMENT: Yusra will continue with the following:. Yusra is participating in a 
home exercise program and is encouraged to continue. - : The patient was 
counseled on the following:  treatment plan and future treatment options.       
Discussion/SummaryYusra is seen for her neck, low back and limb pain. This has 
been worsening over the last few months. The increase in pain may be due to 
taking care of her grand kids almost full time and stress.  She feels unable to 
do a block until she is sure that she will not be called upon to care for her 
grand children so for now she needs to wait. The meds help at current doses that
are a bit higher than before. The goal will be to do a block and reduce the pain
meds she hopes that by September she will know if she can do a block. She will 
be seen in a month. She also could benefit from an emg for her numbness in her 
left arm ...she also defers this.   Signatures Electronically signed by : Arabella Brown NP; 2020  5:20PM EST                       (Author)  
Electronically signed by : Fabricio Camacho MD; Jul 10 2020 10:05AM EST             
         









          Name      Value     Range     Interpretation Code Description Data Alla

rce(s) Supporting 

Document(s)

 

                                                                       









                    ID                  Date                Data Source

 

                    [id number]            2020 08:27:02 AM EDT [facility]

 

                                        [facility], PCName: Kandis kay ChildsDOB: 1960Provider: Loulou Brown: 2020 Chief Complaint 2Patient presents with low back pain and 
thigh pain on the left side.  She is also having some pain down her left arm to 
the tip of middle finger.  MA completing section:  Explanation to patient 
regarding telemedicine Patient initiated contact with the office via phone call 
or via patient portal to schedule a Telehealth visit. I explained to the patient
that telemedicine is the practice of using telecommunications technology to 
evaluate, diagnose and care for patients at a distance. Telehealth visits are a 
way for [facility] to continue providing quality care to our patients while still 
practicing mandated social distancing, in emergency effort to keep both the 
patient and myself safe throughout the COVID-19 pandemic. During this emergent 
initiation of Telemedicine, the office of civil rights has loosened the laws on 
HIPAA compliance to accommodate the continuation of medical care across the [place]
throughout the COVID-19 pandemic.  Telehealth Consent Yusra Dietz is an 
established patient of [facility], and was made aware co-pays and co-insurance may be
waived by their insurer due to COVID-19, as well as, the potential privacy risks
with the use of third-party applications as a result of this Telehealth visit. 
The patient has verbalized consent to proceed with a Telehealth visit via audio 
with live video calling. Location of Provider and Patient for this telehealth 
visit: Provider is located at my residence in [facility] Patient is located at home, in
[place], [place] The following were present during this visit: patient was alone 
Application used for the telehealth visit: [employer]   History of Present 
IllnessThe patient is being seen today for refill of prescriptions for 
controlled substances.      The date of onset of symptoms is approximately 
YEARS.   Current Meds 1. Aleve-D Sinus  Cold TB12; Therapy: (Recorded:2017)
to Recorded 2. Aspirin  MG Oral Tablet Delayed Release; take one pill po 
every day; Therapy: 37Uxn5027 to (Evaluate:2018) Recorded 3. Lexapro 20 MG 
Oral Tablet; Therapy: (Recorded:47Dfv9260) to Recorded 4. Magnesium Oxide 400 MG
Oral Capsule; Therapy: 31Lfa1818 to Recorded 5. Metrogel GEL; Therapy: 
(Recorded:90Evx5998) to Recorded 6. Minocycline HCl CAPS; Therapy: 
(Recorded:22Aee2245) to Recorded 7. Morphine Sulfate ER 15 MG Oral Tablet 
Extended Release; TAKE 1 TABLET EVERY 8 HOURS MDD:3; Therapy: 64Cvg5378 to 
(Evaluate:42Yxb1666)  Requested for: 2020; Last Rx:2020 Ordered 8. 
oxyCODONE-Acetaminophen  MG Oral Tablet; Take 1 tablets every 12 hours as 
needed for pain MDD:2; Therapy: 00Irv3400 to (Evaluate:85Ghg5612)  Requested 
for: 2020; Last Rx:38Ori6986 Ordered 9. Potassium Gluconate 595 (99 K) MG 
Oral Tablet; Therapy: (Recorded:2016) to Recorded 10. Protonix 40 MG Oral 
Tablet Delayed Release;  Therapy: 2018 to Recorded 11. Wellbutrin TABS;  
Therapy: (Recorded:2018) to Recorded Allergies  Morphine Derivatives 
Itching; Updated By: Gneo Zapata; 2018 3:14:52 PMonly iv 
morphineDenied   Adhesive Tape Recorded By: Alesha Hollis; 2016 12:56:08 
PM  Iodinated Contrast Media Recorded By: Alesha Hollis; 2016 12:56:08 PM 
Latex Recorded By: Alesha Hollis; 2016 12:56:08 PM NotesNYSW 30 Day RX 
Note: Chief complaint: Patient is here today for 30 day refill of their 
controlled substance prescription Patient is being treated with chronic opioid 
therapy for low back and leg pain and now some arm pain too.  Patient denies 
side effects related to chronic opioid use and has been re-educated regarding th
e controlled substance agreement. In my opinion patient continues to receive 
primary benefit with chronic opioid therapy. MORPHINE ER, OXYCODONE . CONTROLLED
SUBSTANCE INFORMATION: I advised the patient today/previously regarding 
treatment with the above controlled substance and/or narcotic. The patient was 
then informed of the risks, benefits, and alternatives of the narcotic. The 
risks discussed included but were not limited to physical and/or psychological 
dependence, tolerance of the medication, drowsiness, sleepiness, 
balance/coordination problems, confusion, allergic reaction or any other abnorm
al symptoms. The patient was advised and agreed to use the medication only as 
prescribed, and not to drive, or operate heavy equipment or machinery. The 
patient understood and consented to both undergo a narcotic/opiate regimen and 
agrees to comply with all of the [facility] terms of a 
controlled substance treatment and agreement.  I am giving the patient a 30 day 
refill without changes. The patient consents to move forward with treatment. 
...OPIOID ABUSE is a national epidemic that Physicians and other prescribers 
have the power to help prevent. In our opioid monitoring surveillance to help 
minimize misuse and diversion, moderate to high risk patients are required to 
have face to face 30 day refill of opioids. This will help minimize the 
potential for electronic false requests, deterring potential fraudulent 
behavior. [facility]  Information:  was consulted by my designee and I have 
reviewed the information presented to me and find no aberrant compliance issues.
Patient has been informed.    Physical ExamGeneral: The patient is a well 
nourished/well developed, female, heavy set, who is in mild distress and appears
stated age. Eyes: Lids are atraumatic, no lesions, sclerae are anicteric. Ears, 
Nose, Mouth, Throat: external ears and nose without trauma. Respiratory: Normal 
chest expansion and respiratory effort. Psychological: Alert and oriented to 
person, place and time. Mood and affect are pleasant and appropriate. Judgement 
intact. Insight normal without delusions or hallucinations.  Denies 
suicidal/homicidal ideation.   Results/DataNYSW UDS Prior UDS Results Detail For
m: This patient had a urine drug screen on 2020. Results are in compliance. 
 Assessment 1. Chronic low back pain (724.2,338.29) (M54.5,G89.29) 2. Lumbar 
radiculopathy (724.4) (M54.16) 3. Lumbar spondylosis (721.3) (M47.816) 4. 
Spondylolisthesis, lumbar region (738.4) (M43.16) Plan 1. Renew: Morphine 
Sulfate ER 15 MG Oral Tablet Extended Release; TAKE 1 TABLET EVERY 8 HOURS 
MDD:-3/5/2020 11AM 2. Renew: oxyCODONE-Acetaminophen  MG Oral Tablet; 
TAKE 1 TABLET 3 times daily PRN PAIN MDD:3LD-3/5/2020  11AM [facility] Treatment Plan 
(2): In my opinion based on subjective and objective findings from today's visit
the patient is worsening. Medication:. Due to the current COVID-19 pandemic, 
telemedicine does not allow for the collection of urine drug screening for 
initiation or chronic use of opioid medications. Patient will be subject to 
urine drug screening at next in-office visit. Medication list was reviewed with 
the patient, and updates were made to reflect her current medication regimen.  
Allergy list was reviewed with patient, and any necessary changes were made.    
[facility]  Information:  was consulted by my designee and I have reviewed the 
information presented to me and find no aberrant compliance issues. Patient has 
been informed.  Controlled Substance Prescribed: MORPHINE ER, OXYCODONE . 
CONTROLLED SUBSTANCE INFORMATION: I advised the patient today/previously 
regarding treatment with the above controlled substance and/or narcotic. The 
patient was then informed of the risks, benefits, and alternatives of the 
narcotic. The risks discussed included but were not limited to physical and/or 
psychological dependence, tolerance of the medication, drowsiness, sleepiness, 
balance/coordination problems, confusion, allergic reaction or any other 
abnormal symptoms. The patient was advised and agreed to use the medication only
as prescribed, and not to drive, or operate heavy equipment or machinery. The 
patient understood and consented to both undergo a narcotic/opiate regimen and 
agreed to sign and comply with all of the [facility] 
terms of a controlled substance treatment and agreement. The patient is on the 
lowest possible dose of opioids. The patient denies adverse side effects and 
does not demonstrate any aberrant drug taking behaviors. The patient is able to 
perform ADL's  including the following activities for longer periods of time 
with less pain: activities of daily living, sleeping, walking, standing, 
sitting, grocery shopping . There are no changes in current medications at this 
visit. Patient instructed to continue current regimen.  The patient is on the 
lowest possible dose of opioids. The patient denies adverse side effects and 
does not demonstrate any aberrant drug taking behaviors. The patient is able to 
perform ADL's  including the following activities for longer periods of time 
with less pain: activities of daily living, sleeping, walking, standing, 
sitting, grocery shopping . The prescribed medications are medically necessary 
for pain management and rehabilitation. Treatment includes: PROCEDURE(S): The 
patient defers blocks/procedures at this time THERAPIES: Therapy Treatment Plan:
Deferred: All Therapies: Deferred: per patient request. FOLLOW UP: The patient 
should have a follow up visit in 1 month. CONTINUE TREATMENT: Yusra will continue
with the following:. Yusra is participating in a home exercise program and is 
encouraged to continue. - : The patient was counseled on the following:  
treatment plan and future treatment options. TIme:. Time: Start time: 15:46 P.M.
End time: 15:58. P.M.        Discussion/SummaryYusra is seen today for her low 
back leg and now arm pain. She has been taking care of both of her grand kids 
age 8 months and 5 yrs almost constantly due to her son being called in to work 
the riots that have been occurring.  The kids mother is in the hospital at this 
time. She states that she can not get a block for this reason and is asking for 
an increase in her short acting pain meds.  i have explained that we really do 
try to limit those meds to no more than mdd 2 a day. This this month we will go 
up to mdd 3 but she is aware that after this month she has to find some time to 
get a block she is over do for the RF procedure. She understands that the pain 
meds are not a solution to the pain and that she really will have to consider 
doing some injections they have worked in the past. She is also having some new 
arm pain and pain into her fingers which will need to be worked up next visit 
too.   Signatures Electronically signed by : Arabella Brown NP; 2020  
4:04PM EST                       (Author)  Electronically signed by : Cristian Stovall MD; 2020  8:26AM EST                       









          Name      Value     Range     Interpretation Code Description Data Alla

rce(s) Supporting 

Document(s)

 

                                                                       









                    ID                  Date                Data Source

 

                    [id number]            2020 06:52:40 PM EDT [facility] PCName: Kandis DoshisDOB: 1960Provider: Loulou Brown: 2020 Chief ComplaintPatient presents with low back and leg and 
joint pain  Chief Complaint 2Patient presents with neck  pain [facility] VAS PAIN 
Established:  MA completing section:  [facility] Telemedicine - Explanation to patient
regarding Telemedicine: Explanation to patient regarding telemedicine Patient 
initiated contact with the office via phone call or via patient portal to 
schedule a Telehealth visit. I explained to the patient that telemedicine is the
practice of using telecommunications technology to evaluate, diagnose and care 
for patients at a distance. Telehealth visits are a way for [facility] to continue 
providing quality care to our patients while still practicing mandated social 
distancing, in emergency effort to keep both the patient and myself safe 
throughout the COVID-19 pandemic. During this emergent initiation of 
Telemedicine, the office of civil rights has loosened the laws on HIPAA 
compliance to accommodate the continuation of medical care across the [place] 
throughout the COVID-19 pandemic.  [facility] Telemedicine - TV Consent Established: 
Telehealth Consent Yusra Dietz is an established patient of [facility], and was made 
aware co-pays and co-insurance are waived due to COVID-19, as well as, the 
potential privacy risks with the use of third-party applications as a result of 
this Telehealth visit. The patient has verbalized consent to proceed with a 
Telehealth visit via audio with live video calling. Location of Provider and 
Patient for this telehealth visit: Provider is located at my residence in [facility] 
Patient is located at home, in [place], [place]  The following were present during 
this visit: patient was alone Application used for the telehealth visit: Whats 
[place]   History of Present IllnessDo you have a Brace for your condition? Patient 
does not have a brace for their condition. Do you have a TENS Unit for your 
condition? Patient does not have a TENS Unit for their condition. Supplements: 
Patient is currently taking the following supplements: (see current med list). 
Nerve Conduction: Patient has had a Nerve Conduction Test. Recent 
test/procedures: Patient was asked and denies having any tests since their last 
visit. Patient was asked and denies being seen by any Physicians since their 
last visit. The patient was last seen by a [facility] provider 
on 2020. At today's visit patient presents with their Self Patient is not 
currently working. The patient is being seen for a follow-up.       Pain 
Duration: years   Pain Quality: (Neuropathic)  burning, tingling and pins and ne
edles Pain Quality: (Nociceptive)  aching, dull, sharp and stabbing Timing:  
constant Progression:  worsening Palliation:  block, changing position, 
exercises, non-opioid analgesics, opioid analgesics and stretching Exacerbating:
 bending, climbing stairs, house chores, lifting, standing, walking and weather 
Pain Score:  a current pain level of 7/10, a minimum pain level of 4/10 and a 
maximum pain level of 9/10. Condition type: The patient is being seen for a 
chronic condition. PAIN LOCATION:  the pain is located in the neck,  radiates to
the right shoulder and left shoulder, the pain is located in the low back ,  
(weakness , achy and throbbing pain) radiates to the right buttock, left 
buttock, right hip, left hip, right thigh, right knee, right calf/shin, right 
ankle, right big toe and right pinky toe, the pain is greater on the right side 
more than the left and the pain is in the leg equally with the back. The 
etiology of this injury/condition is unknown. RELATIONSHIP TO INJURY: This 
condition is not related to a specific injury. INJURY MECHANISM: The injury 
resulted from  no known physical event. REVIEW OF PAST DIAGNOSTICS: have 
included:  MRI and electromyography/nerve conduction studies . Records were 
obtained, reviewed and on file. PAST TREATMENT has included:  NONSTEROIDAL ANTI-
INFLAMMATORY drugs (not effective) Includes Mobic., but ANTICONVULSANTS 
(effective) Includes Lyrica., OPIOID ANALGESICS (effective) Includes. 
hydrocodone does not work. fentanyl patch seems to be working well., cervical 
surgery 2015 (effective). - Radio Frequency Pertinent Information: On a 
RADIO FREQUENCY ABLATION of the BILATERAL, LUMBAR FACET JOINTS under fluoroscopy
as confirmed by previous successful medial branch nerve blocks. Targeting the.  
80 % of pain relief was provided which is ongoing. Allows patient to increase 
activity and functionality Including the following activities for longer periods
of time with less pain: activities of daily living, better sleep, walking, 
standing, sitting and grocery shop. And to decrease the following pain 
medication(s): 19, 19 DR. RASHID. Good reduction of pain in the low back
and able to walk better. INTERVAL EVENTS: include . She is doing ok for the most
part taking care of her grand kids so this is keeping her busy. She states that 
she is too busy to worry about herself. The meds really do help. ASSOCIATED 
SYMPTOMS: include difficulty sleeping , difficulty walking, radiating, extremity
weakness:  left, lower extremity, right. and urinary incontinence, but no fecal 
incontinence. FUNCTIONAL LIMITATIONS: The patient's functional status is limited
as follows: ability to  perform activities of daily living, participate in 
aerobic activity, participate in hobbies, perform housework and maintain normal 
sleep pattern. MEDICATION SIDE EFFECTS experienced by patient are:  drowsiness. 
 Review of SystemsROS was reviewed with patient; I feel the ROS to be 
negative/normal other than  low back , neck and joint pain.    Patient maintains
at today's visit there has been no change in his/her hematologic history.     
Active Problems 1. Chronic hip pain, left (719.45,338.29) (M25.552,G89.29) 2. 
Chronic low back pain (724.2,338.29) (M54.5,G89.29) 3. Facet arthropathy, lumbar
(721.3) (M47.816) 4. History of depression (V11.8) (Z86.59) 5. History of 
fibromyalgia (V13.59) (Z87.39) 6. History of osteoarthritis (V13.4) (Z87.39) 7. 
Long term current use of opiate analgesic (V58.69) (Z79.891) 8. Lumbar 
radiculopathy (724.4) (M54.16) 9. Lumbar spondylosis (721.3) (M47.816) 10. 
Osteoarthritis of hip (715.95) (M16.9) 11. Primary osteoarthritis of hip 
(715.15) (M16.10) 12. Spondylolisthesis, lumbar region (738.4) (M43.16) 
Allergies  Morphine Derivatives Itching; Updated By: Geno Zapata; 
2018 3:14:52 PMonly iv morphineDenied   Adhesive Tape Recorded By: 
Alesha Hollis; 2016 12:56:08 PM  Iodinated Contrast Media Recorded By: 
Alesha Hollis; 2016 12:56:08 PM  Latex Recorded By: Alesha Hollis; 
2016 12:56:08 PM Current Meds  Aleve-D Sinus  Cold TB12;Therapy: 
(Recorded:2017) to Recordedld 10/9/17 am  Aspirin  MG Oral Tablet 
Delayed Release; take one pill po every day;Therapy: 43Ikc3129 to 
(Evaluate:2018) Recordedself prescribed  Lexapro 20 MG Oral Tablet;Therapy:
(Recorded:03Tfq5028) to Recorded  Magnesium Oxide 400 MG Oral Capsule;Therapy: 
41Rto5040 to Recorded  Metrogel GEL;Therapy: (Recorded:51Yyr5494) to Recorded  
Minocycline HCl CAPS;Therapy: (Recorded:09Rvi9163) to Recorded  Morphine Sulfate
ER 15 MG Oral Tablet Extended Release; TAKE 1 TABLET EVERY 8HOURS MDD:3;Therapy:
64Kgx4870 to (Evaluate:56Ams8699)  Requested for: 2020; LastRx:03Mkj0365 
OrderedLD-3/5/2020 11AM  oxyCODONE-Acetaminophen  MG Oral Tablet; Take 1 
tablets every 12 hours asneeded for pain MDD:2;Therapy: 71Ttz7373 to 
(Evaluate:52Osv9219)  Requested for: 25Tip4304; LastRx:93Zqq1096 OrderedLD-
3/5/2020  11AM  Potassium Gluconate 595 (99 K) MG Oral Tablet;Therapy: 
(Recorded:2016) to Recorded  Protonix 40 MG Oral Tablet Delayed 
Release;Therapy: 2018 to Recorded  Wellbutrin TABS;Therapy: 
(Recorded:2018) to Recorded Past Medical History  History of Chronic 
headaches (784.0) (R51)  Denied: History of blood coagulation disorder  History 
of degenerative disc disease (V13.59) (Z87.39)  History of depression (V11.8) 
(Z86.59)  History of fibromyalgia (V13.59) (Z87.39)  Assessed By: Arabella Brown 
(Pain Management); Last Assessed: 2017  History of hepatitis A virus 
infection (V12.09) (Z86.19)  History of kidney stones (V13.01) (Z87.442)  
History of osteoarthritis (V13.4) (Z87.39)  Assessed By: Arabella Brown (Pain 
Management); Last Assessed: 2017  History of small bowel obstruction 
(V12.79) (Z87.19)    History of Not currently pregnant (V49.89) (Z78.9)  
Denied: History of On anticoagulant therapy Surgical History  History of 
Appendectomy  3/2013  History of Back Surgery  2015    C5 disk replacement  
History of Gastric Surgery For Morbid Obesity  2006  History of Hernia Repair  
2013  History of Hip Replacement Left  History of Hysterectomy  3/2004  
Denied: History of Implantable Cardioverter-Defibrillator  History of Knee 
Arthroplasty  History of Knee Replacement  2005  History of Knee Surgery  
2015  Denied: History of Pacemaker Placement  History of Total Hip Replacement
  Family History  Family history of arthritis (V17.7) (Z82.61)  Family 
history of cardiac disorder (V17.49) (Z82.49)  Family history of cardiac 
disorder (V17.49) (Z82.49)  Family history of diabetes mellitus (V18.0) (Z83.3) 
Family history of malignant neoplasm (V16.9) (Z80.9) Social History  Current 
non-drinker of alcohol (V49.89) (Z78.9)  Denied: History of Drug use    
Never a smoker  Not currently employed Physical ExamGeneral: The patient is a 
well nourished/well developed, female, heavy set, who is in mild distress and a
ppears stated age. Eyes: Lids are atraumatic, no lesions, sclerae are anicteric.
currently wearing eyeglasses. Ears, Nose, Mouth, Throat: external ears and nose 
without trauma. Psychological: Alert and oriented to person, place and time. 
Mood and affect are pleasant and appropriate. Judgement intact. Insight normal 
without delusions or hallucinations.  Denies suicidal/homicidal ideation.   
Results/DataThis patient had a urine drug screen on 2020. Results are in 
compliance.   Assessment 1. Lumbar radiculopathy (724.4) (M54.16) 2. Chronic low
back pain (724.2,338.29) (M54.5,G89.29) 3. Facet arthropathy, lumbar (721.3) 
(M47.816) 4. Lumbar spondylosis (721.3) (M47.816) Plan 1. Renew: Morphine 
Sulfate ER 15 MG Oral Tablet Extended Release; TAKE 1 TABLET EVERY 8 HOURS 
MDD:3LD-3/5/2020 11AM 2. Renew: oxyCODONE-Acetaminophen  MG Oral Tablet; 
Take 1 tablets every 12 hours as needed for pain MDD:2LD-3/5/2020  11AM 3. 30 
Day RX Management  Follow-up  Status: Hold For - Scheduling  Requested for: 
60Mgd9684). Schedule (FUP) 60 days from today: : 31Jcc9684). Is an additional 
appointment needed....? : Yes). Schedule 30 day RX from TODAY's date (2 days +/-
either way): : 82Uov9560 In my opinion based on subjective and objective 
findings from today's visit the patient is worsening. Medication:. Due to the 
current COVID-19 pandemic, telemedicine does not allow for the collection of 
urine drug screening for initiation or chronic use of opioid medications. 
Patient will be subject to urine drug screening at next in-office visit. 
Medication list was reviewed with the patient, and updates were made to reflect 
her current medication regimen.  Allergy list was reviewed with patient, and any
necessary changes were made.    [facility]  Information:  was consulted by my 
designee and I have reviewed the information presented to me and find no 
aberrant compliance issues. Patient has been informed.  Controlled Substance 
Prescribed: MORPHINE ER, PERCOCET . CONTROLLED SUBSTANCE INFORMATION: I advised 
the patient today/previously regarding treatment with the above controlled 
substance and/or narcotic. The patient was then informed of the risks, benefits,
and alternatives of the narcotic. The risks discussed included but were not 
limited to physical and/or psychological dependence, tolerance of the 
medication, drowsiness, sleepiness, balance/coordination problems, confusion, 
allergic reaction or any other abnormal symptoms. The patient was advised and 
agreed to use the medication only as prescribed, and not to drive, or operate 
heavy equipment or machinery. The patient understood and consented to both und
ergo a narcotic/opiate regimen and agreed to sign and comply with all of the [facility] terms of a controlled substance treatment and 
agreement. The patient is on the lowest possible dose of opioids. The patient 
denies adverse side effects and does not demonstrate any aberrant drug taking 
behaviors. The patient is able to perform ADL's  including the following 
activities for longer periods of time with less pain: activities of daily 
living, sleeping, walking, standing, sitting, grocery shopping . There are no 
changes in current medications at this visit. Patient instructed to continue 
current regimen.  The patient is on the lowest possible dose of opioids. The 
patient denies adverse side effects and does not demonstrate any aberrant drug 
taking behaviors. The patient is able to perform ADL's  including the following 
activities for longer periods of time with less pain: activities of daily 
living, sleeping, walking, standing, sitting, grocery shopping . The prescribed 
medications are medically necessary for pain management and rehabilitation. 
Treatment includes: PROCEDURE(S): The patient defers blocks/procedures at this 
time THERAPIES: Therapy Treatment Plan: Deferred: All Therapies: Deferred: per 
patient request. FOLLOW UP: The patient should have a follow up 30 Day RX. 
CONTINUE TREATMENT: Yusra will continue with the following:. Yusra is 
participating in a home exercise program and is encouraged to continue. - 
: The patient was counseled on the following:  treatment plan and future 
treatment options. TIme:. Time: Start time: 15:33 P.M. End time: 15:45. P.M.    
   Discussion/SummaryYusra is seen today via telehealth video for her low back 
and joint pain and now neck pain. This was done due to Covid 19 and she did 
consent to the visit and stated her name and date of birth for the record.  She 
is having more pain and has been taking care of her grand kids so this is pretty
stressful. She states that she has been thinking about doing a block but at this
time she defers this also. She states that the meds take the edge off and are 
helpful. She is staying pretty active. I have again reviewed that she is 
potentially a MILD candidate and she know this. She defers things and will be 
seen in a month.   Signatures Electronically signed by : Arabella Brown NP; May  6
2020  3:50PM EST                       (Author)  Electronically signed by : Cristian Stovall MD; May  7 2020  6:52PM EST                       









          Name      Value     Range     Interpretation Code Description Data Alla

rce(s) Supporting 

Document(s)

 

                                                                       









                    ID                  Date                Data Source

 

                    [id number]            2020 02:51:37 PM EDT [facility] [place] [facility] PCName: Kandis DoshisDOB: 1960Provider: Vicky Miller: 2020 Chief Complaint 2Patient states she has back pain today 
Explanation to patient regarding telemedicine Patient initiated contact with the
office via phone call or via patient portal to schedule a Telehealth visit. I 
explained to the patient that telemedicine is the practice of using 
telecommunications technology to evaluate, diagnose and care for patients at a 
distance. Telehealth visits are a way for [facility] to continue providing quality 
care to our patients while still practicing mandated social distancing, in 
emergency effort to keep both the patient and myself safe throughout the COVID-
19 pandemic. During this emergent initiation of Telemedicine, the office of 
civil rights has loosened the laws on HIPAA compliance to accommodate the 
continuation of medical care across the [place] throughout the COVID-19 pandemic.  
Telehealth Consent Yusra Dietz is an established patient of [facility], and was made 
aware co-pays and co-insurance are waived due to COVID-19, as well as, the 
potential privacy risks with the use of third-party applications as a result of 
this Telehealth visit. The patient has verbalized consent to proceed with a 
Telehealth visit via audio with live video calling. Location of Provider and 
Patient for this telehealth visit: Provider is located at my residence in [facility] 
Patient is located at home, in [place], [place] The following were present during 
this visit:  Application used for the telehealth visit: [employer]   
History of Present IllnessThe patient is being seen today for refill of 
prescriptions for controlled substances.      The date of onset of symptoms is 
approximately years.   Current Meds 1. Aleve-D Sinus  Cold TB12; Therapy: 
(Recorded:2017) to Recorded 2. Aspirin  MG Oral Tablet Delayed 
Release; take one pill po every day; Therapy: 64Orw8319 to (Evaluate:2018) 
Recorded 3. Lexapro 20 MG Oral Tablet (Escitalopram Oxalate); Therapy: 
(Recorded:45Vyr9233) to Recorded 4. Magnesium Oxide 400 MG Oral Capsule; 
Therapy: 93Nvi3559 to Recorded 5. Metrogel GEL (MetroNIDAZOLE); Therapy: 
(Recorded:20Ppf9519) to Recorded 6. Minocycline HCl CAPS; Therapy: 
(Recorded:35Trz2626) to Recorded 7. Morphine Sulfate ER 15 MG Oral Tablet 
Extended Release; TAKE 1 TABLET EVERY 8 HOURS MDD:3; Therapy: 86Bxe9789 to 
(Evaluate:81Ihw8618)  Requested for: 2020; Last Rx:75Wes2544 Ordered 8. 
oxyCODONE-Acetaminophen  MG Oral Tablet; Take 1 tablets every 12 hours as 
needed for pain MDD:2; Therapy: 97Wld3054 to (Evaluate:28Dah4835)  Requested 
for: 2020; Last Rx:04Mzh4382 Ordered 9. Potassium Gluconate 595 (99 K) MG 
Oral Tablet; Therapy: (Recorded:2016) to Recorded 10. Protonix 40 MG Oral 
Tablet Delayed Release (Pantoprazole Sodium);  Therapy: 2018 to Recorded 
11. Wellbutrin TABS (BuPROPion HCl);  Therapy: (Recorded:2018) to Recorded 
Allergies  Morphine Derivatives Itching; Updated By: Geno Zapata; 
2018 3:14:52 PMonly iv morphineDenied   Adhesive Tape Recorded By: 
Alesha Hollis; 2016 12:56:08 PM  Iodinated Contrast Media Recorded By: 
Alesha Hollis; 2016 12:56:08 PM  Latex Recorded By: Alesha Hollis; 
2016 12:56:08 PM NotesNYSW 30 Day RX Note: Chief complaint: Patient is here 
today for 30 day refill of their controlled substance prescription Patient is 
being treated with chronic opioid therapy for BACK PAIN.  Patient denies side 
effects related to chronic opioid use and has been re-educated regarding the 
controlled substance agreement. In my opinion patient continues to receive 
primary benefit with chronic opioid therapy. MORPHINE ER, OXYCODONE . CONTROLLED
SUBSTANCE INFORMATION: I advised the patient today/previously regarding 
treatment with the above controlled substance and/or narcotic. The patient was 
then informed of the risks, benefits, and alternatives of the narcotic. The 
risks discussed included but were not limited to physical and/or psychological 
dependence, tolerance of the medication, drowsiness, sleepiness, 
balance/coordination problems, confusion, allergic reaction or any other 
abnormal symptoms. The patient was advised and agreed to use the medication only
as prescribed, and not to drive, or operate heavy equipment or machinery. The 
patient understood and consented to both undergo a narcotic/opiate regimen and 
agrees to comply with all of the [facility] terms of a 
controlled substance treatment and agreement.  I am giving the patient a 30 day 
refill without changes. The patient consents to move forward with treatment. ...
OPIOID ABUSE is a national epidemic that Physicians and other prescribers have 
the power to help prevent. In our opioid monitoring surveillance to help 
minimize misuse and diversion, moderate to high risk patients are required to 
have face to face 30 day refill of opioids. This will help minimize the 
potential for electronic false requests, deterring potential fraudulent 
behavior. [facility]  Information:  was consulted by my designee and I have 
reviewed the information presented to me and find no aberrant compliance issues.
Patient has been informed.    Physical ExamGeneral: The patient is a well nour
ished/well developed, female, with a medium build and who is in no acute 
distress. Eyes: Lids are atraumatic, no lesions, sclerae are anicteric. 
currently wearing eyeglasses. Ears, Nose, Mouth, Throat: external ears and nose 
without trauma. Respiratory: Normal chest expansion and respiratory effort. 
Psychological: Alert and oriented to person, place and time. Mood and affect are
pleasant and appropriate. Judgement intact. Insight normal without delusions or 
hallucinations.  Denies suicidal/homicidal ideation.   [facility] 
Prior UDS Results Detail Form: Results are not in compliance. Drug Compliance 
notified   Assessment 1. Chronic low back pain (724.2,338.29) (M54.5,G89.29) 2. 
Facet arthropathy, lumbar (721.3) (M47.816) Plan 1. Renew: Morphine Sulfate ER 
15 MG Oral Tablet Extended Release; TAKE 1 TABLET EVERY 8 HOURS MDD:3LD-3/5/2020
11AM 2. Renew: oxyCODONE-Acetaminophen  MG Oral Tablet; Take 1 tablets 
every 12 hours as needed for pain MDD:2LD-3/5/2020  11AM [facility] Treatment Plan 
(2): Medication:. Due to the current COVID-19 pandemic, telemedicine does not 
allow for the collection of urine drug screening for initiation or chronic use 
of opioid medications. Patient will be subject to urine drug screening at next 
in-office visit. Medication list was reviewed with the patient, and updates were
made to reflect her current medication regimen.  Allergy list was reviewed with 
patient, and any necessary changes were made.    [facility]  Information:  was 
consulted by my designee and I have reviewed the information presented to me and
find no aberrant compliance issues. Patient has been informed.  Controlled 
Substance Prescribed: MORPHINE ER, OXYCODONE . CONTROLLED SUBSTANCE INFORMATION:
I advised the patient today/previously regarding treatment with the above 
controlled substance and/or narcotic. The patient was then informed of the 
risks, benefits, and alternatives of the narcotic. The risks discussed included 
but were not limited to physical and/or psychological dependence, tolerance of 
the medication, drowsiness, sleepiness, balance/coordination problems, 
confusion, allergic reaction or any other abnormal symptoms. The patient was 
advised and agreed to use the medication only as prescribed, and not to drive, 
or operate heavy equipment or machinery. The patient understood and consented to
both undergo a narcotic/opiate regimen and agreed to sign and comply with all of
the [facility] terms of a controlled substance 
treatment and agreement.  There are no changes in current medications at this 
visit. Patient instructed to continue current regimen.  The prescribed 
medications are medically necessary for pain management and rehabilitation. 
Treatment includes: FOLLOW UP: The patient already has a follow-up appointment. 
TIme:. Time: Start time: 3:17 P.M. End time: 3:20. P.M.        
Discussion/SummaryPatient was seen for a telehealth visit today through the use 
of live video calling.  Yusra is a 60 yo female with chronic low  back pain, seen
today for her medication refill visit. Stable on her current medication regimen.
No changes made, refills provided. She has a follow up next month that she will 
keep.   Signatures Electronically signed by : Ana Miller NP; 2020  
3:24PM EST                       (Author)  Electronically signed by : Fabricio Camacho MD; 2020  2:51PM EST                       









          Name      Value     Range     Interpretation Code Description Data Alla

rce(s) Supporting 

Document(s)

 

                                                                       









                    ID                  Date                Data Source

 

                    [id number]            2020 12:20:32 PM EST [facility]

[facility] PCName: Kandis kay TicosDOB: 1960Provider: Loulou Brown: 2020 Chief ComplaintPatient presents with neck and low back pain
 Chief Complaint 2[place] VAS PAIN Established:  MA completing section: DB CNA   
History of Present IllnessDo you have a Brace for your condition? Patient does 
not have a brace for their condition. Recent test/procedures: Patient was asked 
and denies having any tests since their last visit. Patient was asked and denies
being seen by any Physicians since their last visit. At today's visit patient 
presents with their Self Patient is retired. What was patient's previous 
occupation? . The patient is being seen for a follow-up.        
Pain Quality: (Neuropathic)  burning and numbness Pain Quality: (Nociceptive)  
aching, dull and sharp Timing:  constant Progression:  unchanged Palliation:  
block, changing position, opioid analgesics, rest and resting/laying down 
Exacerbating:  standing, walking and exercise Pain Score:  a current pain level 
of 7/10, a minimum pain level of 6/10 and a maximum pain level of 10/10. 
Condition type: The patient is being seen for a chronic condition. PAIN 
LOCATION:  the pain is located in the low back ,  (weakness , achy and throbbing
pain) radiates to the right buttock, left buttock, right hip, left hip, right 
thigh, right knee, right calf/shin, right ankle, right big toe and right pinky 
toe, the pain is greater on the right side more than the left and the pain is in
the leg equally with the back. The etiology of this injury/condition is unknown.
RELATIONSHIP TO INJURY: This condition is not related to a specific injury. 
INJURY MECHANISM: The injury resulted from  no known physical event. REVIEW OF 
PAST DIAGNOSTICS: have included:  MRI and electromyography/nerve conduction 
studies . Records were obtained, reviewed and on file. PAST TREATMENT has 
included:  NONSTEROIDAL ANTI-INFLAMMATORY drugs (not effective) Includes Mobic.,
but ANTICONVULSANTS (effective) Includes Lyrica., OPIOID ANALGESICS (effective) 
Includes. hydrocodone does not work. fentanyl patch seems to be working well., 
cervical surgery 2015 (effective). - Radio Frequency Pertinent Information:
On a RADIO FREQUENCY ABLATION of the BILATERAL, LUMBAR FACET JOINTS under 
fluoroscopy as confirmed by previous successful medial branch nerve blocks. 
Targeting the.  80 % of pain relief was provided which is ongoing. Allows 
patient to increase activity and functionality Including the following 
activities for longer periods of time with less pain: activities of daily 
living, better sleep, walking, standing, sitting and grocery shop. And to 
decrease the following pain medication(s): 19, 19 DR. RASHID. Good 
reduction of pain in the low back and able to walk better. INTERVAL EVENTS: 
include . She is doing ok for the most part taking care of her grand kids so 
this is keeping her busy. She states that she is too busy to worry about 
herself. The meds really do help. ASSOCIATED SYMPTOMS: include difficulty 
sleeping , difficulty walking, radiating, extremity weakness:  left, lower 
extremity, right. and urinary incontinence, but no fecal incontinence. 
FUNCTIONAL LIMITATIONS: The patient's functional status is limited as follows: 
ability to  perform activities of daily living, participate in aerobic activity,
participate in hobbies, perform housework and maintain normal sleep pattern. 
MEDICATION SIDE EFFECTS experienced by patient are:  drowsiness.   Review of 
SystemsROS was reviewed with patient; documented on established patient 
questionnaire dated 3-5-2020. I feel the ROS to be negative/normal other than  
neck back joint pain.    Patient maintains at today's visit there has been no 
change in his/her hematologic history.     Active Problems 1. Chronic hip pain, 
left (719.45,338.29) (M25.552,G89.29) 2. Chronic low back pain (724.2,338.29) 
(M54.5,G89.29) 3. Facet arthropathy, lumbar (721.3) (M47.816) 4. History of 
depression (V11.8) (Z86.59) 5. History of fibromyalgia (V13.59) (Z87.39) 6. 
History of osteoarthritis (V13.4) (Z87.39) 7. Long term current use of opiate 
analgesic (V58.69) (Z79.891) 8. Lumbar radiculopathy (724.4) (M54.16) 9. Lumbar 
spondylosis (721.3) (M47.816) 10. Osteoarthritis of hip (715.95) (M16.9) 11. 
Primary osteoarthritis of hip (715.15) (M16.10) 12. Spondylolisthesis, lumbar 
region (738.4) (M43.16) Allergies  Morphine Derivatives Itching; Updated By: 
Geno Zapata; 2018 3:14:52 PMonly iv morphineDenied   Adhesive Tape 
Recorded By: Alesha Hollis; 2016 12:56:08 PM  Iodinated Contrast Media 
Recorded By: Alesha Hollis; 2016 12:56:08 PM  Latex Recorded By: Alesha Hollis; 2016 12:56:08 PM Current Meds  Aleve-D Sinus  Cold TB12;Therapy: 
(Recorded:2017) to Recordedld 10/9/17 am  Aspirin  MG Oral Tablet 
Delayed Release; take one pill po every day;Therapy: 54Ecq5000 to 
(Evaluate:2018) Recordedself prescribed  Lexapro 20 MG Oral Tablet;Therapy:
(Recorded:99Buw4438) to Recorded  Magnesium Oxide 400 MG Oral Capsule;Therapy: 
57Zvj5393 to Recorded  Metrogel GEL;Therapy: (Recorded:96Dip5839) to Recorded  
Minocycline HCl CAPS;Therapy: (Recorded:33Zkr9868) to Recorded  Morphine Sulfate
ER 15 MG Oral Tablet Extended Release; TAKE 1 TABLET EVERY 8HOURS MDD:3;Therapy:
99Zem4540 to (Evaluate:2020)  Requested for: 47Fzg9438; LastRx:87Xch1925 
OrderedLD-3/5/2020 11AM  oxyCODONE-Acetaminophen  MG Oral Tablet; Take 1 
tablets every 12 hours asneeded for pain MDD:2;Therapy: 26Ehk6745 to 
(Evaluate:2020)  Requested for: 49Ido6993; LastRx:67Jug1603 OrderedLD-
3/5/2020  11AM  Potassium Gluconate 595 (99 K) MG Oral Tablet;Therapy: 
(Recorded:2016) to Recorded  Protonix 40 MG Oral Tablet Delayed 
Release;Therapy: 2018 to Recorded  Wellbutrin TABS;Therapy: 
(Recorded:2018) to Recorded Past Medical History  History of Chronic 
headaches (784.0) (R51)  Denied: History of blood coagulation disorder  History 
of degenerative disc disease (V13.59) (Z87.39)  History of depression (V11.8) 
(Z86.59)  History of fibromyalgia (V13.59) (Z87.39)  Assessed By: Arabella Brown 
(Pain Management); Last Assessed: 2017  History of hepatitis A virus 
infection (V12.09) (Z86.19)  History of kidney stones (V13.01) (Z87.442)  
History of osteoarthritis (V13.4) (Z87.39)  Assessed By: Arabella Brown (Pain 
Management); Last Assessed: 2017  History of small bowel obstruction 
(V12.79) (Z87.19)    History of Not currently pregnant (V49.89) (Z78.9)  
Denied: History of On anticoagulant therapy Surgical History  History of 
Appendectomy  3/2013  History of Back Surgery  2015    C5 disk replacement  
History of Gastric Surgery For Morbid Obesity  2006  History of Hernia Repair  
2013  History of Hip Replacement Left  History of Hysterectomy  3/2004  
Denied: History of Implantable Cardioverter-Defibrillator  History of Knee 
Arthroplasty  History of Knee Replacement  2005  History of Knee Surgery  
2015  Denied: History of Pacemaker Placement  History of Total Hip Replacement
  Family History  Family history of arthritis (V17.7) (Z82.61)  Family 
history of cardiac disorder (V17.49) (Z82.49)  Family history of cardiac 
disorder (V17.49) (Z82.49)  Family history of diabetes mellitus (V18.0) (Z83.3) 
Family history of malignant neoplasm (V16.9) (Z80.9) Social History  Current 
non-drinker of alcohol (V49.89) (Z78.9)  Denied: History of Drug use    
Never a smoker  Not currently employed VitalsVital Signs Recorded: 2020 
04:01PM Height: 5 ft 6 inWeight: 204 lb BMI Calculated: 32.93BSA Calculated: 2.0
2Systolic: 116, SittingDiastolic: 74, SittingHeart Rate: 82Respiration: 16Height
measured w/wo shoes: w/shoesPain Scale: 7 Physical ExamGeneral: The patient is a
well nourished/well developed, female, with a medium build, who is in no acute 
distress and appears stated age. Eyes: Lids are atraumatic, no lesions, sclerae 
are anicteric. Ears, Nose, Mouth, Throat: external ears and nose without trauma.
Gait and Station: Gait was normal. Lumbosacral Spine: - Inspection: normal 
appearance. No deformity, ecchymosis, erythema or swelling noted. Normal 
Lordosis.. - Palpation/Tenderness: Tenderness on palpation of the LEFT: 
paraspinal , sciatic notch and sacroiliac joint. - Palpation/Tenderness: 
Tenderness on palpation of the RIGHT: paraspinal , sciatic notch and sacroiliac 
joint. - ROM Flexion: was painful. - ROM Extension: was painful.Right Lower 
Extremity Motor:- Hip: 5/5 flexion, 5/5 extension- Knee: 5/5 flexion, 5/5 
extension- Foot: 5/5 Plantar , 5/5 DorsiFlexionSpecial Tests: flip test was 
negative, positive facet challenge  and positive faberLeft Lower Extremity 
Motor:- Hip: 5/5 flexion, 5/5 extension- Knee: 5/5 flexion, 5/5 extension- Foot:
5/5 Plantar , 5/5 DorsiFlexionSpecial Tests: flip test was negative, positive 
facet challenge  and positive faberNeurological:. Skin: Warm, dry, acyanotic. 
Psychological: Alert and oriented to person, place and time. Mood and affect are
pleasant and appropriate. Judgement intact. Insight normal without delusions or 
hallucinations.  Denies suicidal/homicidal ideation.   Assessment 1. Chronic low
back pain (724.2,338.29) (M54.5,G89.29) 2. Lumbar radiculopathy (724.4) (M54.16)
3. Lumbar spondylosis (721.3) (M47.816) 4. Spondylolisthesis, lumbar region 
(738.4) (M43.16) Plan 1. Renew: Morphine Sulfate ER 15 MG Oral Tablet Extended 
Release; TAKE 1 TABLET EVERY 8 HOURS MDD:3LD-3/5/2020 11AM 2. Renew: oxyCODONE-
Acetaminophen  MG Oral Tablet; Take 1 tablets every 12 hours as needed for
pain MDD:2LD-3/5/2020  11AM 3. 30 Day RX Management  Follow-up  Status: Complete
 Done: 2020). Schedule (FUP) 60 days from today: : 2020). Is an 
additional appointment needed....? : Yes). Schedule 30 day RX from TODAY's date 
(2 days +/- either way): : 2020 In my opinion based on subjective and 
objective findings from today's visit the patient is minimally changed. 
Medication:.  [place]  Information:  was consulted by my designee and I have 
reviewed the information presented to me and find no aberrant compliance issues.
Patient has been informed.  Controlled Substance Prescribed: MORPHINE ER, 
PERCOCET. The patient is on the lowest possible dose of opioids. The patient 
denies adverse side effects and does not demonstrate any aberrant drug taking 
behaviors. The patient is able to perform ADL's  including the following 
activities for longer periods of time with less pain: activities of daily 
living, sleeping, walking, standing, sitting, grocery shopping . There are no 
changes in current medications at this visit. Patient instructed to continue 
current regimen.  The prescribed medications are medically necessary for pain 
management and rehabilitation. Treatment includes: PROCEDURE(S): the patient 
defers blocks/procedures at this time THERAPIES: Therapy Treatment Plan: 
Deferred: All Therapies: Deferred: per patient request. FOLLOW UP: The patient 
should have a follow up 30 Day RX. CONTINUE TREATMENT: Yusra will continue with 
the following:. Yusra is participating in a home exercise program and is 
encouraged to continue. - : The patient was counseled on the following:  
treatment plan and future treatment options.        Discussion/SummaryYusra is 
seen for her low back and leg pain. She is stable on current regime. She is 
aware of treatment options including the possibility of MILD procedure.. AT this
time she is caring for her grand kids . She will be seen in a month. She may try
CBD to see if this might help.   Signatures Electronically signed by : Arabella Brown NP; Mar  5 2020  5:18PM EST                       (Author)  
Electronically signed by : Sonja Jones MD; Mar  6 2020 12:20PM EST              
        









          Name      Value     Range     Interpretation Code Description Data Alla

rce(s) Supporting 

Document(s)

 

                                                                       









                    ID                  Date                Data Source

 

                    [id number]            2020 08:52:56 PM EST [place]

[facility] PCName: Kandis DoshisDOB: 1960Provider: CheoNan: 2020 Chief Complaint 2 MA completing section: dburtch   
History of Present IllnessA Urine Drug Screen was ordered for Yusra Dietz and 
collected on site today 2020. Creatinine has been ordered as well for 
specimen validity, not for kidney function. Preliminary UDS results are not 
final and should not be used to determine patient care or plan of treatment. 
Initially a qualitative immunoassay screen will be done. Any positive findings 
or negative findings that are out of compliance will be further tested with a 
more comprehensive quantitative confirmation LCMS study. It is part of the 
normal prescribing protocol of controlled substances and is considered standard 
of care.   The patient is being seen today for refill of prescriptions for 
controlled substances.      The date of onset of symptoms is approximately 25 
years.   Current Meds 1. Aleve-D Sinus  Cold TB12; Therapy: (Recorded:23Tbl6551)
to Recorded 2. Aspirin  MG Oral Tablet Delayed Release; take one pill po 
every day; Therapy: 14Meb8598 to (Evaluate:2018) Recorded 3. Lexapro 20 MG 
Oral Tablet; Therapy: (Recorded:81Wsp9874) to Recorded 4. Magnesium Oxide 400 MG
Oral Capsule; Therapy: 79Rzq6719 to Recorded 5. Metrogel GEL; Therapy: 
(Recorded:26Tob9219) to Recorded 6. Minocycline HCl CAPS; Therapy: 
(Recorded:00Wcz9860) to Recorded 7. Morphine Sulfate ER 15 MG Oral Tablet 
Extended Release; TAKE 1 TABLET EVERY 8 HOURS MDD:3; Therapy: 66Qjo0142 to 
(Evaluate:02Nsv8997)  Requested for: 2020; Last Rx:2020 Ordered 8. 
oxyCODONE-Acetaminophen  MG Oral Tablet; Take 1 tablets every 12 hours as 
needed for pain MDD:2; Therapy: 12Zrp8249 to (Evaluate:92Bxh4541)  Requested 
for: 2020; Last Rx:2020 Ordered 9. Potassium Gluconate 595 (99 K) MG 
Oral Tablet; Therapy: (Recorded:2016) to Recorded 10. Protonix 40 MG Oral 
Tablet Delayed Release;  Therapy: 2018 to Recorded 11. Wellbutrin TABS;  
Therapy: (Recorded:2018) to Recorded Allergies  Morphine Derivatives 
Itching; Updated By: Geno Zapata; 2018 3:14:52 PMonly iv 
morphineDenied   Adhesive Tape Recorded By: Alesha Hollis; 2016 12:56:08 
PM  Iodinated Contrast Media Recorded By: Alesha Hollis; 2016 12:56:08 PM 
Latex Recorded By: Alesha Hollis; 2016 12:56:08 PM NotesNYSW 30 Day RX 
Note: Chief complaint: Patient is here today for 30 day refill of their 
controlled substance prescription Patient is being treated with chronic opioid 
therapy for CHRONIC LOW BACK PAIN.  Patient denies side effects related to 
chronic opioid use and has been re-educated regarding the controlled substance 
agreement. In my opinion patient continues to receive primary benefit with 
chronic opioid therapy. MORPHINE, OXYCODONE . CONTROLLED SUBSTANCE INFORMATION: 
I advised the patient today/previously regarding treatment with the above 
controlled substance and/or narcotic. The patient was then informed of the risk
s, benefits, and alternatives of the narcotic. The risks discussed included but 
were not limited to physical and/or psychological dependence, tolerance of the 
medication, drowsiness, sleepiness, balance/coordination problems, confusion, 
allergic reaction or any other abnormal symptoms. The patient was advised and 
agreed to use the medication only as prescribed, and not to drive, or operate 
heavy equipment or machinery. The patient understood and consented to both 
undergo a narcotic/opiate regimen and agrees to comply with all of the [place] 
[facility] terms of a controlled substance treatment and agreement.  I 
am giving the patient a 30 day refill without changes. The patient consents to 
move forward with treatment. ...OPIOID ABUSE is a national epidemic that 
Physicians and other prescribers have the power to help prevent. In our opioid 
monitoring surveillance to help minimize misuse and diversion, moderate to high 
risk patients are required to have face to face 30 day refill of opioids. This 
will help minimize the potential for electronic false requests, deterring 
potential fraudulent behavior. [facility]  Information:  was consulted by my 
designee and I have reviewed the information presented to me and find no 
aberrant compliance issues. Patient has been informed.    Physical ExamGeneral: 
The patient is a well nourished/well developed, female, who is obese, who is in 
no acute distress and appears stated age. Eyes: Lids are atraumatic, no lesions,
sclerae are anicteric. Ears, Nose, Mouth, Throat: external ears and nose without
trauma. Gait and Station: Gait was normal. Respiratory: Normal chest expansion 
and respiratory effort. Skin: Warm, dry, acyanotic. Psychological: Alert and 
oriented to person, place and time. Mood and affect are pleasant and 
appropriate. Judgement intact. Insight normal without delusions or 
hallucinations.  Denies suicidal/homicidal ideation.   Assessment 1. Chronic low
back pain (724.2,338.29) (M54.5,G89.29) 2. Lumbar radiculopathy (724.4) (M54.16)
Plan 1. Renew: Morphine Sulfate ER 15 MG Oral Tablet Extended Release; TAKE 1 
TABLET EVERY 8 HOURS MDD:3LD 2020 2. Renew: oxyCODONE-Acetaminophen  
MG Oral Tablet; Take 1 tablets every 12 hours as needed for pain MDD:2LD 
2020 3. UDS-LAB Medicare / Commercial ([facility] Urine Drug SCreen); [Do Not 
Release]; Specimen Source:Urine; Status:In Progress - Specimen/Data Collected;  
Done: 60Rxr1636 [facility] Treatment Plan (2): In my opinion based on subjective and 
objective findings from today's visit the patient is minimally changed. 
Medication:. There are no changes in current medications at this visit. Patient 
instructed to continue current regimen.  UDS: This is an established patient and
is on ongoing opioid therapy. A UDS is being obtained today, the patient has 
previously consented to UDS as part of the Controlled Substance and Treatment 
Agreement. The reason for today's UDS is: patient was selected at random and 
scored as moderate risk on the opioid risk assessment. Creatinine has been 
ordered as well for specimen validity, not for kidney function. Preliminary UDS 
results are not final and should not be used to determine patient care or plan 
of treatment. Initially a qualitative immunoassay screen will be done. Any 
positive findings or negative findings that are out of compliance will be 
further tested with a more comprehensive quantitative confirmation LCMS study. 
It is part of the normal prescribing protocol of controlled substances and is 
considered standard of care.  Treatment includes: FOLLOW UP: The patient should 
have a follow up visit in 1 month.        Signatures Electronically signed by : 
Dave Navarro NP; 2020  3:38PM EST                       (Author)  
Electronically signed by : Sourav Shepherd MD; 2020  8:52PM EST               
       









          Name      Value     Range     Interpretation Code Description Data Alla

rce(s) Supporting 

Document(s)

 

                                                                       









                    ID                  Date                Data Source

 

                    [id number]            2020 04:27:55 PM EST [place] [facility]

 

                                        [place] [facility], PCName: Kandis DoshisDOB: 1960Provider: Loulou Brown: 2020 Chief ComplaintPatient presents with back, neck and limb 
pain  Chief Complaint 2[place] VAS PAIN Established:  MA completing section: 
AWellenzohn LPN   History of Present IllnessDo you have a Brace for your 
condition? Patient does not have a brace for their condition. Recent 
test/procedures: Patient was asked and denies having any tests since their last 
visit. Patient was asked and denies being seen by any Physicians since their 
last visit. At today's visit patient presents with their Self Patient is not 
currently working. 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 The patient is
being seen for a follow-up.       Pain Duration:    Pain Quality: 
(Neuropathic)  burning and numbness Pain Quality: (Nociceptive)  aching and 
sharp Timing:  constant Progression:  unchanged Palliation:  block, heat, opioid
analgesics and rest Exacerbating:  standing, walking and weather Pain Score:  a 
current pain level of 6/10 and a maximum pain level of 10/10. Condition type: 
The patient is being seen for a chronic condition. PAIN LOCATION:  the pain is 
located in the low back ,  (weakness , achy and throbbing pain) radiates to the 
right buttock, left buttock, right hip, left hip, right thigh, right knee, right
calf/shin, right ankle, right big toe and right pinky toe, the pain is greater 
on the right side more than the left and the pain is in the leg equally with the
back. The etiology of this injury/condition is unknown. RELATIONSHIP TO INJURY: 
This condition is not related to a specific injury. INJURY MECHANISM: The injury
resulted from  no known physical event. REVIEW OF PAST DIAGNOSTICS: have 
included:  MRI and electromyography/nerve conduction studies . Records were 
obtained, reviewed and on file. PAST TREATMENT has included:  NONSTEROIDAL ANTI-
INFLAMMATORY drugs (not effective) Includes Mobic., but ANTICONVULSANTS 
(effective) Includes Lyrica., OPIOID ANALGESICS (effective) Includes. 
hydrocodone does not work. fentanyl patch seems to be working well., cervical 
surgery 2015 (effective). - Radio Frequency Pertinent Information: On a 
RADIO FREQUENCY ABLATION of the BILATERAL, LUMBAR FACET JOINTS under fluoroscopy
as confirmed by previous successful medial branch nerve blocks.  80 % of pain 
relief was provided which is ongoing. Allows patient to increase activity and 
functionality including the following activities for longer periods of time with
less pain: activities of daily living, better sleep, walking, standing, sitting,
grocery shop . 19, 19 DR. RASHID., good reduction of pain in the low 
back and able to walk better. INTERVAL EVENTS: include . She is stable on 
current regime. The pain is well managed with the meds...she is caring for her 
grand kids so this is keeping her busy. She does not feel like she needs a block
at this time. ASSOCIATED SYMPTOMS: include difficulty sleeping , difficulty 
walking, radiating, extremity weakness:  left, lower extremity, right. and 
urinary incontinence, but no fecal incontinence. FUNCTIONAL LIMITATIONS: The 
patient's functional status is limited as follows: ability to  perform 
activities of daily living, participate in aerobic activity, participate in 
hobbies, perform housework and maintain normal sleep pattern. MEDICATION SIDE 
EFFECTS experienced by patient are:  drowsiness.   Review of SystemsROS was 
reviewed with patient; documented on established patient questionnaire dated 
20. I feel the ROS to be negative/normal other than  neck, back and joint 
pain.    Patient maintains at today's visit there has been no change in his/her 
hematologic history.     Active Problems 1. Chronic hip pain, left 
(719.45,338.29) (M25.552,G89.29) 2. Chronic low back pain (724.2,338.29) 
(M54.5,G89.29) 3. Facet arthropathy, lumbar (721.3) (M47.816) 4. History of 
depression (V11.8) (Z86.59) 5. History of fibromyalgia (V13.59) (Z87.39) 6. 
History of osteoarthritis (V13.4) (Z87.39) 7. Long term current use of opiate 
analgesic (V58.69) (Z79.891) 8. Lumbar radiculopathy (724.4) (M54.16) 9. Lumbar 
spondylosis (721.3) (M47.816) 10. Osteoarthritis of hip (715.95) (M16.9) 11. 
Primary osteoarthritis of hip (715.15) (M16.10) 12. Spondylolisthesis, lumbar re
gion (738.4) (M43.16) Allergies  Morphine Derivatives Itching; Updated By: 
Geno Zapata; 2018 3:14:52 PMonly iv morphineDenied   Adhesive Tape 
Recorded By: Alesha Hollis; 2016 12:56:08 PM  Iodinated Contrast Media 
Recorded By: Alesha Hollis; 2016 12:56:08 PM  Latex Recorded By: Alesha Hollis; 2016 12:56:08 PM Current Meds  Aleve-D Sinus  Cold TB12;Therapy: 
(Recorded:2017) to Recordedld 10/9/17 am  Aspirin  MG Oral Tablet 
Delayed Release; take one pill po every day;Therapy: 46Bum9428 to 
(Evaluate:14Rio8711) Recordedself prescribed  Lexapro 20 MG Oral Tablet;Therapy:
(Recorded:81Txk8088) to Recorded  Magnesium Oxide 400 MG Oral Capsule;Therapy: 
67Bza8047 to Recorded  Metrogel GEL;Therapy: (Recorded:18Uqj3817) to Recorded  
Minocycline HCl CAPS;Therapy: (Recorded:69Oxy7211) to Recorded  Morphine Sulfate
ER 15 MG Oral Tablet Extended Release; TAKE 1 TABLET EVERY 8HOURS MDD:3;Therapy:
32Rsr4291 to (Evaluate:2020)  Requested for: 69Gnx8228; LastRx:14Cbo8834 
OrderedLD 11:30am 20  oxyCODONE-Acetaminophen  MG Oral Tablet; Take 1 
tablets every 12 hours asneeded for pain MDD:2;Therapy: 96Msg2835 to 
(Evaluate:2020)  Requested for: 30Sjm2020; LastRx:78Tvz6949 OrderedLD 
11:30am 20  Potassium Gluconate 595 (99 K) MG Oral Tablet;Therapy: 
(Recorded:2016) to Recorded  Protonix 40 MG Oral Tablet Delayed 
Release;Therapy: 2018 to Recorded  Wellbutrin TABS;Therapy: 
(Recorded:2018) to Recorded Past Medical History  History of Chronic 
headaches (784.0) (R51)  Denied: History of blood coagulation disorder  History 
of degenerative disc disease (V13.59) (Z87.39)  History of depression (V11.8) 
(Z86.59)  History of fibromyalgia (V13.59) (Z87.39)  Assessed By: Arabella Brown 
(Pain Management); Last Assessed: 2017  History of hepatitis A virus 
infection (V12.09) (Z86.19)  History of kidney stones (V13.01) (Z87.442)  
History of osteoarthritis (V13.4) (Z87.39)  Assessed By: Arabella Brown (Pain 
Management); Last Assessed: 2017  History of small bowel obstruction 
(V12.79) (Z87.19)    History of Not currently pregnant (V49.89) (Z78.9)  
Denied: History of On anticoagulant therapy Surgical History  History of 
Appendectomy  3/2013  History of Back Surgery  2015    C5 disk replacement  
History of Gastric Surgery For Morbid Obesity    History of Hernia Repair  
2013  History of Hip Replacement Left  History of Hysterectomy  3/2004  
Denied: History of Implantable Cardioverter-Defibrillator  History of Knee 
Arthroplasty  History of Knee Replacement  2005  History of Knee Surgery  
2015  Denied: History of Pacemaker Placement  History of Total Hip Replacement
 2002 Family History  Family history of arthritis (V17.7) (Z82.61)  Family 
history of cardiac disorder (V17.49) (Z82.49)  Family history of cardiac 
disorder (V17.49) (Z82.49)  Family history of diabetes mellitus (V18.0) (Z83.3) 
Family history of malignant neoplasm (V16.9) (Z80.9) Social History  Current 
non-drinker of alcohol (V49.89) (Z78.9)  Denied: History of Drug use    
Never a smoker  Not currently employed VitalsVital Signs Recorded: 2020 
03:36PM Height: 5 ft 6.5 inWeight: 209 lb BMI Calculated: 33.23BSA Calculated: 
2.05Systolic: 120, SittingDiastolic: 80, SittingHeart Rate: 72Respiration: 
16Height measured w/wo shoes: w/o shoesPain Scale: 6 Physical ExamGeneral: The 
patient is a well nourished/well developed, female, who is in no acute distress 
and appears stated age. Eyes: Lids are atraumatic, no lesions, sclerae are 
anicteric. Ears, Nose, Mouth, Throat: external ears and nose without trauma. 
Gait and Station: Gait was normal. Psychological: Alert and oriented to person, 
place and time. Mood and affect are pleasant and appropriate. Judgement intact. 
Insight normal without delusions or hallucinations.  Denies suicidal/homicidal 
ideation.   Assessment 1. Chronic low back pain (724.2,338.29) (M54.5,G89.29) 2.
Facet arthropathy, lumbar (721.3) (M47.816) 3. Lumbar radiculopathy (724.4) 
(M54.16) 4. Chronic hip pain, left (719.45,338.29) (M25.552,G89.29) 5. Lumbar 
spondylosis (721.3) (M47.816) Plan 1. Renew: Morphine Sulfate ER 15 MG Oral 
Tablet Extended Release; TAKE 1 TABLET EVERY 8 HOURS MDD:3LD 11:30am 20 2. 
Renew: oxyCODONE-Acetaminophen  MG Oral Tablet; Take 1 tablets every 12 
hours as needed for pain MDD:2LD 11:30am 20 In my opinion based on 
subjective and objective findings from today's visit the patient is minimally 
changed. Medication:.  [place]  Information:  was consulted by my designee and
I have reviewed the information presented to me and find no aberrant compliance 
issues. Patient has been informed.  Controlled Substance Prescribed: MORPHINE 
ER, PERCOCET. The patient is on the lowest possible dose of opioids. The patient
denies adverse side effects and does not demonstrate any aberrant drug taking 
behaviors. The patient is able to perform ADL's  including the following 
activities for longer periods of time with less pain: activities of daily 
living, sleeping, walking, standing, sitting, grocery shopping . There are no 
changes in current medications at this visit. Patient instructed to continue 
current regimen.  The prescribed medications are medically necessary for pain 
management and rehabilitation. Treatment includes: PROCEDURE(S): the patient 
defers blocks/procedures at this time THERAPIES: Therapy Treatment Plan: 
Deferred: All Therapies: Deferred: per patient request. FOLLOW UP: The patient 
should have a follow up 30 Day RX. CONTINUE TREATMENT: Yusra will continue with 
the following:. Yusra is participating in a home exercise program and is 
encouraged to continue. - : The patient was counseled on the following:  
treatment plan and future treatment options.        Discussion/SummaryYusra is 
seen for her low back and neck pain. She is reporting that for the most part she
is ok but that the meds do help. She is not yet ready for a block. She also is 
potentially a MILD candidate at some point in time she may want to see Dr. Rashid 
in the office to discuss any other options available to her. She will be seen in
a month.   Signatures Electronically signed by : Arabella Brown NP; 2020  
4:27PM EST                       (Author)  Electronically signed by : Laura Castro MD; 2020  4:27PM EST                       









          Name      Value     Range     Interpretation Code Description Data Alla

rce(s) Supporting 

Document(s)

 

                                                                       









                    ID                  Date                Data Source

 

                    [id number]            2019 08:31:05 AM EST [place]

[facility], PCName: Kandis kay ChildsDOB: 1960Provider: 

RamonHoracioQuinten: 2019 Chief Complaint 2MA completing section: DAIANA Domínguez   History of Present IllnessThe patient is being seen today for refill of 
prescriptions for controlled substances.         Current Meds 1. Aleve-D Sinus  
Cold TB12; Therapy: (Recorded:2017) to Recorded 2. Aspirin  MG Oral 
Tablet Delayed Release; take one pill po every day; Therapy: 11Ofi6070 to 
(Evaluate:2018) Recorded 3. Lexapro 20 MG Oral Tablet; Therapy: 
(Recorded:58Vbs2416) to Recorded 4. Magnesium Oxide 400 MG Oral Capsule; 
Therapy: 15Zue8271 to Recorded 5. Metrogel GEL; Therapy: (Recorded:32Uxe8829) to
Recorded 6. Minocycline HCl CAPS; Therapy: (Recorded:03Ahi2337) to Recorded 7. 
Morphine Sulfate ER 15 MG Oral Tablet Extended Release; TAKE 1 TABLET EVERY 8 
HOURS MDD:3; Therapy: 05Uml4901 to (Evaluate:70Vpv4735)  Requested for: 
2019; Last Rx:2019 Ordered 8. oxyCODONE-Acetaminophen  MG Oral 
Tablet; Take 1 tablets every 12 hours as needed for pain MDD:2; Therapy: 
22Pps5906 to (Evaluate:48Wug6226)  Requested for: 2019; Last Rx:2019 
Ordered 9. Potassium Gluconate 595 (99 K) MG Oral Tablet; Therapy: 
(Recorded:2016) to Recorded 10. Protonix 40 MG Oral Tablet Delayed Release;
 Therapy: 2018 to Recorded 11. Wellbutrin TABS;  Therapy: 
(Recorded:2018) to Recorded Allergies  Morphine Derivatives Itching; 
Updated By: Geno Zapata; 2018 3:14:52 PMonly iv morphineDenied   
Adhesive Tape Recorded By: Alesha Hollis; 2016 12:56:08 PM  Iodinated 
Contrast Media Recorded By: Alesha Hollis; 2016 12:56:08 PM  Latex 
Recorded By: Alesha Hollis; 2016 12:56:08 PM NotesNYSW 30 Day RX Note: 
Chief complaint: Patient is here today for 30 day refill of their controlled 
substance prescription Patient is being treated with chronic opioid therapy for 
CHRONIC LOW BACK PAIN.  Patient denies side effects related to chronic opioid 
use and has been re-educated regarding the controlled substance agreement. In my
opinion patient continues to receive primary benefit with chronic opioid 
therapy. MORPHINE, OXYCODONE . CONTROLLED SUBSTANCE INFORMATION: I advised the 
patient today/previously regarding treatment with the above controlled substance
and/or narcotic. The patient was then informed of the risks, benefits, and 
alternatives of the narcotic. The risks discussed included but were not limited 
to physical and/or psychological dependence, tolerance of the medication, 
drowsiness, sleepiness, balance/coordination problems, confusion, allergic 
reaction or any other abnormal symptoms. The patient was advised and agreed to 
use the medication only as prescribed, and not to drive, or operate heavy 
equipment or machinery. The patient understood and consented to both undergo a 
narcotic/opiate regimen and agrees to comply with all of the [facility] terms of a controlled substance treatment and agreement.  I am giving 
the patient a 30 day refill without changes. The patient consents to move 
forward with treatment. ...OPIOID ABUSE is a national epidemic that Physicians 
and other prescribers have the power to help prevent. In our opioid monitoring 
surveillance to help minimize misuse and diversion, moderate to high risk 
patients are required to have face to face 30 day refill of opioids. This will 
help minimize the potential for electronic false requests, deterring potential 
fraudulent behavior. [facility]  Information:  was consulted by my designee and I
have reviewed the information presented to me and find no aberrant compliance 
issues. Patient has been informed.    Physical ExamGeneral: The patient is a 
well nourished/well developed, female, who is obese, who is in no acute distress
and appears stated age. Eyes: Lids are atraumatic, no lesions, sclerae are 
anicteric. Ears, Nose, Mouth, Throat: external ears and nose without trauma. 
Gait and Station: Gait was normal. Respiratory: Normal chest expansion and 
respiratory effort. Skin: Warm, dry, acyanotic. Psychological: Alert and 
oriented to person, place and time. Mood and affect are pleasant and 
appropriate. Judgement intact. Insight normal without delusions or 
hallucinations.  Denies suicidal/homicidal ideation.   Assessment 1. Chronic low
back pain (724.2,338.29) (M54.5,G89.29) 2. Long term current use of opiate 
analgesic (V58.69) (Z79.891) Plan 1. Renew: Morphine Sulfate ER 15 MG Oral 
Tablet Extended Release; TAKE 1 TABLET EVERY 8 HOURS MDD:3LD-124/19-2:00PM 2. 
Renew: oxyCODONE-Acetaminophen  MG Oral Tablet; Take 1 tablets every 12 
hours as needed for pain MDD:2LD-19-2:00PM [facility] Treatment Plan (2): Treatm
ent includes: FOLLOW UP: The patient should have a follow up visit in 1 month.  
     Signatures Electronically signed by : FE Lr; Dec  4 2019  
7:08PM EST                       (Author)  Electronically signed by : Cristian Stovall MD; Dec 11 2019  8:31AM EST                       









          Name      Value     Range     Interpretation Code Description Data Alla

rce(s) Supporting 

Document(s)

 

                                                                       







                                        Procedure

 

                                          



                                                                                
                                                                                
                                                                                
                                                                           



Social History

          



           Code       Duration   Value      Status     Description Data Source(s

)

 

             Smoking      2020 12:00:00 AM EDT Patient has never smoked co

mpleted    Patient 

has never smoked                        [facility]



                                                                                
                 



Vital Signs

          



                    ID                  Date                Data Source

 

                    UNK                                      









           Name       Value      Range      Interpretation Code Description Data

 Source(s)

 

           Body mass index (BMI) [Ratio] 33.9 kg/m2                       33.9 k

g/m2 [facility]

 

           Ideal body weight 130 [lb_av]                       130 [lb_av] [facility]

[facility]

 

           Oxygen saturation in Arterial blood by Pulse oximetry 99 %           

                  99 %       [facility]

 

           Body weight 215.00 [lb_av]                       215.00 [lb_av] [facility]

[facility]

 

           Body height 66.75 [in_i]                       66.75 [in_i] [facility]

 

                                        5'6.75" 

 

           Respiratory rate 18 /min                          18 /min    [facility]

[facility]

 

           Body temperature 97.3 [degF]                       97.3 [degF] [facility]

 [facility]

 

           Heart rate 85 /min                          85 /min    [facility]

 

           Diastolic blood pressure 73 mm[Hg]                        73 mm[Hg]  

[facility]

 

           Systolic blood pressure 108 mm[Hg]                       108 mm[Hg] M

[facility]

 

           Body mass index (BMI) [Ratio] 33.0 kg/m2                       33.0 k

g/m2 [facility]

 

           Ideal body weight 130 [lb_av]                       130 [lb_av] [facility]

[facility]

 

           Oxygen saturation in Arterial blood by Pulse oximetry 98 %           

                  98 %       [facility]

 

           Body weight 209.38 [lb_av]                       209.38 [lb_av] [facility]

[facility]

 

           Body height 66.75 [in_i]                       66.75 [in_i] [facility]

 

                                        5'6.75" 

 

           Respiratory rate 16 /min                          16 /min    [facility]

[facility]

 

           Body temperature 97.5 [degF]                       97.5 [degF] [facility]

 [facility]

 

           Heart rate 82 /min                          82 /min    [facility]

 

           Diastolic blood pressure 60 mm[Hg]                        60 mm[Hg]  

[facility]

 

           Systolic blood pressure 127 mm[Hg]                       127 mm[Hg] M

[facility]

 

           Body mass index (BMI) [Ratio] 33.3 kg/m2                       33.3 k

g/m2 [facility]

 

           Oxygen saturation in Arterial blood by Pulse oximetry 98 %           

                  98 %       [facility]

 

           Body weight 211.25 [lb_av]                       211.25 [lb_av] [facility]

[facility]

 

           Body height 66.75 [in_i]                       66.75 [in_i] [facility]

 

                                        5'6.75" 

 

           Respiratory rate 16 /min                          16 /min    [facility]

[facility]

 

           Body temperature 98.0 [degF]                       98.0 [degF] [facility]

 [facility]

 

           Heart rate 77 /min                          77 /min    [facility]

 

           Diastolic blood pressure 65 mm[Hg]                        65 mm[Hg]  

[facility]

 

           Systolic blood pressure 126 mm[Hg]                       126 mm[Hg] M

[facility]

## 2021-01-20 NOTE — HPEPDOC
[facility] Medical History & Physical


Date of Admission


Jan 20, 2021


Date of Service:  Jan 20, 2021


Primary Care Physician:  Teri Dickerson MD


Attending Physician:  LEONARDO GARCIA MD





History and Physical


CHIEF COMPLAINT: sent by PCP for abnormal CXR





HISTORY OF PRESENT ILLNESS: Anitha Dietz is a 61 YO F with history of 

osteoarthritis, depression, obesity who presents to ED at the direction of her 

PCP for abnormal CXR. She reports that for one week now she has had worsening 

shortness of breath with exertion, fatigue, and chest pressure that radiates 

into her back. She notes that she did have an upper respiratory infection she 

caught from her grand-daughters around [place] but has since recovered. She 

was feeling well after this infection until a week ago. She first noticed her 

heart was racing as she was trying to fall asleep at night and feeling 

intermittently hot/cold. She denies any palpitations or lower extremity 

swelling. Her PCP ordered a CXR which she did at [facility] showing 

congestion and interstitial edema. She denies any shortness of breath at res

t--only with exertion. She does have a long family history of CAD and her father

passed away from an MI at the age of 45. Her brother and her mother both have 

atrial fibrillation. 





PAST MEDICAL HISTORY:


Obesity


Depression


Arthritis


History of SBO 2/2 adhesions from previous abd surgeries, 2018





PAST SURGICAL HISTORY:


Right wrist surgery.


Right greater saphenous vein stripping.


Left knee replacement.


Right hip replacement.


Hysterectomy.


Joshua-en-Y gastric bypass.


Hemorrhoidectomy.


Exploratory laparotomy with appendectomy and abdominal washout.


Open ventral hernia repair with mesh.








SOCIAL HISTORY:


Never smoker


Occasional EtOH


Denies other illicit drugs


Retired, lives with . Cares for 2 young grandchildren





FAMILY HISTORY:


Reviewed and noncontributory





ALLERGIES: Please see below.





REVIEW OF SYSTEMS:


Constitutional:  Reports some temperature fluctuation, chills. Denies recent 

weight change, no fevers


ENT/Mouth:  No Hearing Changes, No Ear Pain, No Nasal Congestion, No Sinus Pain,

No Hoarseness, No sore throat, No Rhinorrhea, No Swallowing Difficulty


Eyes:  No Eye Pain, No Swelling, No Redness, No Foreign Body, No Discharge, No 

Vision Changes


Cardiovascular:  Reports chest pressure radiating to her back, Reports PND, 

Reports orthopnea, Denies lower extremity edema, reports PITTMAN


Respiratory:  No Cough, No Wheezing, Reports PITTMAN


Gastrointestinal:  No Nausea, No Vomiting, No Diarrhea, No Constipation, No 

Pain, No Heartburn, No Anorexia, No Dysphagia, No Hematochezia, No Melena, No 

Flatulence, No Jaundice


Genitourinary:  No Dysuria


Musculoskeletal:  Reports chronic low back pain, some joint pain


Skin:  No Skin Lesions, No Pruritis


Neuro:  No Weakness, No Numbness, No Paresthesias, No Loss of Consciousness, No 

Syncope, No Dizziness, No Headache, No Coordination Changes, No Recent Falls


Psych:  No Anxiety/Panic, No Depression, No Insomnia, No Personality Changes, No

Delusions


Heme/Lymph:  No Bruising, No Bleeding, No Transfusions History, No 

Lymphadenopathy


Endocrine:  No Polyuria, No Polydipsia, No Temperature Intolerance





HOME MEDICATIONS: Please see below. 





PHYSICAL EXAMINATION:


VITAL SIGNS: see below


GENERAL: alert and oriented, in no apparent distress, pleasant and conversant in

full sentences.


HEENT: PERRL, EOMI, Oral mucous membranes are moist without lesions.


NECK: The patient has no noted JVD. No adenopathy is appreciated. No thyromegaly


CHEST/LUNGS: Crackles are appreciated at the bases up to middle lobes 

bilaterally. There is no subcutaneous air appreciated. There is no tenderness to

the chest wall.


HEART:Irregularly irregular rate, tachycardic . No murmurs, rubs, or gallops are

appreciated. Distal pulses are 2+. No carotid bruits appreciated.


ABDOMEN: Soft, nontender, and nondistended. Bowel sounds are positive. No 

organomegaly is appreciated. No masses are appreciated. There are no peritoneal 

signs. There is no [place] sign.


EXTREMITIES: No peripheral edema. There is no focal long bone tenderness or 

deformity.


SKIN: The patients skin is warm and dry, without rashes or lesions.


PSYCHIATRIC: AAO x 3, normal mood/affect


NEUROLOGIC: The patient has 5/5 strength to the upper and lower extremities 

bilaterally. Sensation is intact throughout. Deep tendon reflexes are 2+ in all 

four extremities. There are no deficits to the cranial nerves.








LABORATORY DATA: See below.





IMAGING: 





CXR:


FINDINGS:


Mild cardiomegaly.  There is vascular congestion to moderate diffuse 

interstitial


edema.  The mediastinal silhouette otherwise appears unremarkable.


IMPRESSION:


Mild cardiomegaly, vascular congestion, and mild to moderate diffuse intersti

tial


edema.








MICROBIOLOGY: Please see below. 





ASSESSMENT: This is a 61 YO F with history of obesity, osteoarthritis, 

depression who presents to ED with one week worsening dyspnea on exertion, 

fatigue, chest pressure fount to be in new onset atrial fibrillation with RVR 

and elevated BNP concerning for new onset congestive heart failure. 





PLAN:





1. New-onset Atrial fibrillation with RVR, unspecified: 


-ED MD spoke to Cardiology (Yazmin) who recommended Digoxin. Patient received 

first dose 0.5mg at 1933. HR currently 140s


-Second dose 0.25mg IV to be given at 0100


-Echocardiogram ordered, pending


-Will defer anticoagulation at this time as patient's CHADSVASC score 1 point, 

or low risk of stroke given that she does not have any other major risk factors 

including DM2/HTN/Hx CVA


-Continuous telemetry


-Troponins negative. Will trend


-TSH WNL





2. New onset Congestive heart failure, unspecified:


-Echocardiogram pending


-S/p 1 dose lasix 40mg IV in ED


-Patient has crackles up to middle lobes bilaterally


-CXR shows vascular congestion, interstitial edema


-BNP elevated at 2852


-Continue lasix 40mg Q4H


-Strict I/O


-Daily weights


-Fluid restriction 2,000cc/day for now





3. Osteoarthritis:


-Continue home pain management





4. Mood disorder:


-Continue Wellbutrin, lexapro





5. GERD:


-Continue Protonix





DVT ppx: TEDs/SCDs





DISPO: pending echo, clinical improvement





Vital Signs





Vital Signs








  Date Time  Temp Pulse Resp B/P (MAP) Pulse Ox O2 Delivery O2 Flow Rate FiO2


 


1/20/21 20:24  128      


 


1/20/21 19:33    119/76    


 


1/20/21 18:14   16  98 Room Air  


 


1/20/21 17:13 97.6       











Laboratory Data


Labs 24H


Laboratory Tests 2


1/20/21 18:16: 


Immature Granulocyte % (Auto) 0.4, Neutrophils (%) (Auto) 57.9, Lymphocytes (%) 

(Auto) 31.3, Monocytes (%) (Auto) 8.0H, Eosinophils (%) (Auto) 1.7, Basophils 

(%) (Auto) 0.7, Neutrophils # (Auto) 3.1, Lymphocytes # (Auto) 1.7, Monocytes # 

(Auto) 0.4, Eosinophils # (Auto) 0.1, Basophils # (Auto) 0.0, Nucleated Red 

Blood Cells % (auto) 0.0, Anion Gap 5L, Glomerular Filtration Rate > 60.0, 

Calcium Level 9.8, Total Bilirubin 0.6, Direct Bilirubin 0.2, Aspartate Amino 

Transf (AST/SGOT) 24, Alanine Aminotransferase (ALT/SGPT) 30, Alkaline Phospha

tase 131H, Total Creatine Kinase 61, Creatine Kinase MB 1.8, Creatine Kinase MB 

Relative Index 2.95, Troponin I < 0.02, NT-Pro-B-Type Natriuretic Peptide 2852H,

Total Protein 6.1L, Albumin 3.6, Albumin/Globulin Ratio 1.4, Thyroid Stimulating

Hormone (TSH) 3.200, Thyroxine (T4) 8.2


1/20/21 18:32: 


Prothrombin Time 15.6H, Prothromb Time International Ratio 1.21, Activated 

Partial Thromboplast Time 30.5, Phosphorus Level 3.9, Magnesium Level 2.0


1/20/21 19:02: 


Coronavirus (COVID-19)(PCR) NEGATIVE, Influenza Type A (RT-PCR) NEGATIVE, 

Influenza Type B (RT-PCR) NEGATIVE, Respiratory Syncytial Virus (PCR) NEGATIVE


CBC/BMP


Laboratory Tests


1/20/21 18:16











Home Medications


Scheduled


Aspirin (Aspirin EC) 325 Mg Tabec, 325 MG PO DAILY


Bupropion Hcl (Bupropion HCl Sr) 150 Mg Tab, 150 MG PO BID


Cholecalciferol (Vitamin D3) (Vitamin D3) 1,000 Unit Tablet, 5,000 UNITS PO 

QWEEK


   MONDAYS 


Ergocalciferol (Vitamin D2) (Vitamin D2) 50,000 Units Cap, 50,000 UNITS PO QWEEK


   SATURDAYS 


Escitalopram Oxalate (Lexapro) 20 Mg Tab, 20 MG PO QHS


Magnesium Oxide (Magnesium Oxide) 400 Mg Tablet, 800 MG PO QHS


Minocycline HCl (Minocycline HCl) 50 Mg Capsule, 50 MG PO DAILY


Morphine Sulfate (Morphine Sulfate) 15 Mg Tablet, 15 MG PO TID


Pantoprazole Sodium (Pantoprazole Sodium) 40 Mg Tab, 40 MG PO QHS


Potassium Gluconate (Potassium) 99 Mg Tablet, 198 MG PO QHS





Scheduled PRN


Cyclobenzaprine HCl (Cyclobenzaprine HCl) 10 Mg Tablet, 10 MG PO TID PRN for 

SPASMS


Oxycodone HCl/Acetaminophen (Percocet  mg Tablet) 1 Each Tablet, 1 TAB PO 

BID PRN for PAIN





Allergies


Coded Allergies:  


     morphine (Verified  Adverse Reaction, Mild, PRURITIS, 1/20/21)


   


   IV MORPHINE MAKES HER ITCH





A-FIB/CHADSVASC


A-FIB History


Current/History of A-Fib/PAF?:  Yes


Current PO Anticoag Therapy:  No





Age/Risk Factor Scoring


CHADSVASC:  








CHADSVASC Response (Comments) Value


 


Age Risk Factor Age < 65 years old 0


 


Gender Risk Factor Female 1


 


Hx of CHF No 0


 


Hx of HTN No 0


 


Hx of Stroke/TIA/or VTE No 0


 


Hx of Diabetes No 0


 


Hx of Vascular Disease No 0


 


Total  1











GME ATTESTATION


GME ATTESTATION


My faculty preceptor for this patient encounter was physically present during 

the encounter and was fully available. All aspects of the patient interview, 

examination, medical decision making process, and medical care plan development 

were reviewed and approved by the faculty preceptor. The faculty preceptor is 

aware and concurs with the plan as stated in the body of this note and will 

attest to such by his/her cosignature.





ATTENDING NOTE


TIME OF SERVICE 915PM





Ms. Dietz is a 60 yr old w a hx of obesity and depression admitted for new on

set A fib  & CHF.





Rest per 's H&P











KISHA IRELAND MD              Jan 20, 2021 22:10


LEONARDO GARCIA MD                Jan 21, 2021 06:44

## 2021-01-20 NOTE — ECGEPIP
[facility] - ED

                                       

                                       Test Date:    2021

Pat Name:     YUSRA WHITE              Department:   

Patient ID:   [id number]                 Room:         -

Gender:       Female                   Technician:   MIKO

:          1960               Requested By: STEFIF YARBROUGH

Order Number: [id number]     Reading MD:   Trent Quinn

                                 Measurements

Intervals                              Axis          

Rate:         155                      P:            

KY:           0                        QRS:          87

QRSD:         88                       T:            0

QT:           259                                    

QTc:          416                                    

                           Interpretive Statements

ATRIAL FIBRILLATION WITH RAPID VENTRICULAR RESPONSE

LOW QRS VOLTAGE IN EXTREMITY LEADS

POSSIBLE ANTERIOR MYOCARDIAL INFARCTION, PROBABLY OLD

RHYTHM/RATE CHANGE COMPARED TO 4/9/15

Electronically Signed on 2021 20:54:38 EST by Trent Quinn

## 2021-01-20 NOTE — CCD
Continuity of Care Document (CCD)

                             Created on: 2021



Anitha Dietz

External Reference #: [id number]

: 1960

Sex: Female



Demographics





                          Address                   [address] 

[place]  [address]

 

                          Home Phone                [phone]

 

                          Preferred Language        Unknown

 

                          Marital Status            Unknown

 

                          Yazdanism Affiliation     Pent[employer]al Yarsanism

 

                          Race                      White

 

                          Ethnic Group              Not  or 





Author





                          Author                    Anitha HIGH M.D.

 

                          Organization              Unknown

 

                          Address                   [address]

[place]  [address]



 

                          Phone                     [phone]







Care Team Providers





                    Care Team Member Name Role                Phone

 

                    [facility]          

      [phone]







Problems





                    Active Problems     Provider            Date

 

                    Mixed hyperlipidemia Teri High M.D. Onset: 

011

 

                    Depressive disorder Teri High M.D. Onset: 20

11

 

                    Menopausal and postmenopausal disorders Teri High M.D. Onset: 

2011

 

                    Bariatric Surgery Status Teri High M.D. Onset: 







Social History





                Type            Date            Description     Comments

 

                Birth Sex                       Unknown          

 

                Tobacco Use     Start: Unknown  Never Smoked Cigarettes  

 

                Tobacco Use     Start: Unknown  Never Used Smokeless Tobacco  

 

                ETOH Use                        Rarely consumes alcohol  

 

                Tobacco Use     Start: Unknown  Patient has never smoked  

 

                Recreational Drug Use                 Denies Drug Use  

 

                Smoking Status  Reviewed: 20 Patient has never smoked  

 

                Exercise Type/Frequency                 Exercises regularly Walk

s the dog twice a day 

 

                Tattoo/Piercing                 Pierced ears     

 

                Sun Exposure                    Minimum amount of sun exposure  

 

                Sun Exposure                    Uses sunscreen   

 

                Seat Belt/Car Seat                 Always uses seat belt  

 

                Bike Helmet                     Never           Unable to bike r

rikki 

 

                Smoke Alarms                    Yes              

 

                Smoke Alarms                    Carbon Monoxide Detector: Yes  







Allergies, Adverse Reactions, Alerts





             Active Allergies Reaction     Severity     Comments     Date

 

             Morphine     itching                                09/15/2011

 

                                        Inactive Allergies

 

             NKDA                                                2004







Medications





           Active Medications SIG        Qnty       Indications Ordering Provide

r Date

 

                                        Vitamin D (Ergocalciferol)              

       1.25mg (01190 Ut) Capsules       

                take one capsule by mouth once weekly 12caps                    

      Teri High M.D.                                    2021

 

                                        Bupropion Hydrochloride ER (SR)         

            150mg Tablets ER 12HR       

             1 by mouth twice a day 180tabs                   Teri High M.D. 06/10/2019

 

                          Escitalopram Oxalate                     20mg Tablets 

                  1 by 

mouth every day 90tabs          F32.1           Teri High M.D. 

018

 

                          Oxycodone HCL                     10mg Tablets        

           1 tab by mouth 

bid                                             Unknown         

 

                          Pantoprazole Sodium                     40mg Tablets D

R                   take 

one tablet by mouth every day for heartburn/ acid reflux 90tabs                 

                 Monica Mckinney FNP                              

 

                          Morphine Sulfate                     15mg Tablets     

              one tab by 

mouth tid                                       Unknown         

 

                    Potassium & Magnesium                      Capsules         

          one po qd           

                                        Unknown             

 

                          Vitamin D High Potency                     5000Iu Caps

ules                   1 

by mouth once a week every Monday                                 Unknown       

  

 

                          Aspir-81 Ec                     81mg Tablets DR       

            1 by mouth 

every day                                       Unknown         

 

                          Minocycline HCL                     50mg Capsules     

              1 by mouth 

every day                                       Unknown         







Immunizations





             CPT Code     Status       Date         Vaccine      Lot #

 

                [id number]           Given           10/16/2020      Influenza Virus 

Vaccine, Quadrivalent,age 3 and 

up,multidose vial                        

 

                [id number]           Given           2019      Influenza Virus 

Vaccine, Quadrivalent,age 3 and 

up,multidose vial                       [id number]

 

                06675           Given           2018      Influenza Virus 

Vaccine, Quadrivalent,age 3 and 

up,multidose vial                       [id number]

 

             24290        Given        01/15/2011   Adacel 11 Yrs or older  







Vital Signs





                Date            Vital           Result          Comment

 

                2021  5:01pm BP Systolic     108 mmHg         

 

                    BP Diastolic        73 mmHg              

 

                    Heart Rate          85 /min              

 

                    Body Temperature    97.3 F             

 

                    Respiratory Rate    18 /min              

 

                    Height              66.75 inches        5'6.75"

 

                    Weight              215.00 lb            

 

                    O2 % BldC Oximetry  99 %                 

 

                    Peak Expiratory Flow Rate 355                 Estimated Peak

 Flow Rate

 

                    Ideal Body Weight   130 lb               

 

                    BMI (Body Mass Index) 33.9 kg/m2           

 

                2020 12:11pm BP Systolic     127 mmHg         

 

                    BP Diastolic        60 mmHg              

 

                    Heart Rate          82 /min              

 

                    Body Temperature    97.5 F             

 

                    Respiratory Rate    16 /min              

 

                    Height              66.75 inches        5'6.75"

 

                    Weight              209.38 lb            

 

                    O2 % BldC Oximetry  98 %                 

 

                    Peak Expiratory Flow Rate 357                 Estimated Peak

 Flow Rate

 

                    Ideal Body Weight   130 lb               

 

                    BMI (Body Mass Index) 33.0 kg/m2           







Results





                                        Description

 

                                        No Information Available







Procedures





                Date            Code            Description     Status

 

                2020      48797832        Mammogram       Completed

 

                2020      [id number]       Bone Mineral Density Test Comple

lulu

 

                2018      [id number]        Colonoscopy     Completed

 

                2014      [id number]        Mammogram       Completed







Medical Devices





                                        Description

 

                                        No Information Available







Encounters





                                        Description

 

                                        No Information Available







Assessments





                Date            Code            Description     Provider

 

                2021      F32.1           Major depressive disorder, singl

e episode, moderate Teri High M.D.

 

                2021      E78.2           Mixed hyperlipidemia YAJAIRA High M.D.

 

                2021      M79.7           Fibromyalgia    Teri High M.D.

 

                    2021          Z00.00              Encounter for genera

l adult medical examination without 

abnormal findings                       Teri High M.D.

 

                2021      R73.01          Impaired fasting glucose Teri Brewster M.D.

 

                2021      N94.11          Superficial (introital) dyspareu

aundrea Teri High M.D.







Plan of Treatment

Future Appointment(s):* 2021  4:45 pm - Teri High M.D. at Main 
  Office

2021 - Teri High M.D.* F32.1 Major depressive disorder, single 
  episode, moderate* Comments:* Doing well on medication.  Continue Lexapro.





* E78.2 Mixed hyperlipidemia* New Labs:* Comprehensive Metabolic Profil, 
  Scheduled: 21

* CBC With Differential, Scheduled: 21

* Lipid Panel, Scheduled: 21



* Comments:* staying off statins.  doing well.





* M79.7 Fibromyalgia* Comments:* Following with pain management and doing OK.





* Z00.00 Encounter for general adult medical examination without abnormal 
  findings* Follow up:* 6 moths with labs





* R73.01 Impaired fasting glucose* New Labs:* Hemoglobin A1c, Scheduled: 
  21





* N94.11 Superficial (introital) dyspareunia* Recommendations:* Try Replense 
  over hte counter.  If this doesn't help we will resume topical estrogen.





* All * New Medication:* Vitamin D (Ergocalciferol) 1.25 mg (74709 Ut) - take 
  one capsule by mouth once weekly









Goals

2021 - Teri High M.D.* Z00.00 Encounter for general adult 
  medical examination without abnormal findings* BMI is elevated.  Work on 
  improving diet, portion control and aim for 30 minutes of moderate intensity 
  exercise at least 5 days a week.     







Functional Status





                Functional Condition Comment         Date            Status

 

                Trifocal glasses                                 Active

 

                Independent with all ADL's                                 Activ

e







Mental Status





                Mental Condition Comment         Date            Status

 

                None                                            Active







Referrals





                                        Description

 

                                        No Information Available

## 2021-01-20 NOTE — CCD
Summarization Of Episode

                             Created on: 2021



YUSRA DIETZ

External Reference #: [id number]

: 1960

Sex: Female



Demographics





                          Address                   [address] [place] [address]  [id number]

 

                          Home Phone                [phone]

 

                          Preferred Language        English

 

                          Marital Status            

 

                          Presybeterian Affiliation     [facility]

 

                          Race                      White

 

                          Ethnic Group              Not  or 





Author





                          Author                    [facility]

 

                          Organization              [facility]

 

                          Address                   Unknown

 

                          Phone                     Unavailable







Support





                Name            Relationship    Address         Phone

 

                DISABLED        Next Of Kin     Unknown         Unavailable

 

                    GOLDY DIETZ    Next Of Kin         [id number] [place] [address]  [id number] [phone]

 

                    Goldy Dietz    ECON                [address] 

[place]  [address]               [phone]







Care Team Providers





                    Care Team Member Name Role                Phone

 

                    HAKEEM Dickerson MD Unavailable         Unavailable

 

                    Tuan, HAKEEM Williamson MD Unavailable         Unavailable

 

                    Tuan, HAKEEM Williamson MD Unavailable         Unavailable

 

                    Tuan, HAKEEM Williamsno MD Unavailable         Unavailable

 

                    Tuan, HAKEEM Williamson MD Unavailable         Unavailable

 

                    Tuan, HAKEEM Williamson MD Unavailable         Unavailable

 

                    Tuan, HAKEEM Williamson MD Unavailable         Unavailable

 

                    Tuan, HAKEEM Williamson MD Unavailable         Unavailable

 

                    Tuan, HAKEEM Williamson MD Unavailable         Unavailable

 

                    Tuan, HAKEEM Williamson MD Unavailable         Unavailable

 

                    Tuan, HAKEEM Williamson MD Unavailable         Unavailable

 

                    Tuan, HAKEEM Williamson MD Unavailable         Unavailable

 

                    Tuan, HAKEEM Williamson MD Unavailable         Unavailable

 

                    Tuan, HAKEEM Williamson MD Unavailable         Unavailable

 

                    Tuan, HAKEEM Williamson MD Unavailable         Unavailable

 

                    Tuan, HAKEEM Williamson MD Unavailable         Unavailable

 

                    Tuan, HAKEEM Williamson MD Unavailable         Unavailable

 

                    Tuan, HAKEEM Williamson MD Unavailable         Unavailable

 

                    Tuan, HAKEEM Williamson MD Unavailable         Unavailable

 

                    Tuan, HAKEEM Williamson MD Unavailable         Unavailable

 

                    Tuan, HAKEEM Williamson MD Unavailable         Unavailable

 

                    Tuan, HAKEEM Williamson MD Unavailable         Unavailable

 

                    Tuan, HAKEEM Williamson MD Unavailable         Unavailable

 

                    Tuan, HAKEEM Williamson MD Unavailable         Unavailable

 

                    Tuan, HAKEEM Williamson MD Unavailable         Unavailable

 

                    Tuan, HAKEEM Williamson MD Unavailable         Unavailable

 

                    Tuan, HAKEEM Williamson MD Unavailable         Unavailable

 

                    Tuan, HAKEEM Williamson MD Unavailable         Unavailable

 

                    Tuan, HAKEEM Williamson MD Unavailable         Unavailable

 

                    Tuan, HAKEEM Williamson MD Unavailable         Unavailable

 

                    Tuan, HAKEEM Williamson MD Unavailable         Unavailable

 

                    Tuan, HAKEEM Williamson MD Unavailable         Unavailable

 

                    Tuan, HAKEEM Williamson MD Unavailable         Unavailable

 

                    Tuan, HAKEEM Williamson MD Unavailable         Unavailable

 

                    Tuan, HAKEEM Williamson MD Unavailable         Unavailable

 

                    Tuan, HAKEEM Williamson MD Unavailable         Unavailable

 

                    Tuan, HAKEEM Williamson MD Unavailable         Unavailable

 

                    Tuan, HAKEEM Williamson MD Unavailable         Unavailable

 

                    Tuan, HAKEEM Williamson MD Unavailable         Unavailable

 

                    Tuan, HAKEEM Williamson MD Unavailable         Unavailable

 

                    Tuan, HAKEEM Williamson MD Unavailable         Unavailable

 

                    Tuan, HAKEEM Williamson MD Unavailable         Unavailable

 

                    Tuan, HAKEEM Williamson MD Unavailable         Unavailable

 

                    Tuan, HAKEEM Williamson MD Unavailable         Unavailable

 

                    Tuan, HAKEEM Williamson MD Unavailable         Unavailable

 

                    Tuan, HAKEEM Williamson MD Unavailable         Unavailable

 

                    Tuan, HAKEEM Williamson MD Unavailable         Unavailable

 

                    Tuan, HAKEEM Williamson MD Unavailable         Unavailable

 

                    Tuan, HAKEEM Williamson MD Unavailable         Unavailable

 

                    Tuan, HAKEEM Williamson MD Unavailable         Unavailable

 

                    Tuan, HAKEEM Williamson MD Unavailable         Unavailable

 

                    Tuan, HAKEEM Williamson MD Unavailable         Unavailable

 

                    Tuan, HAKEEM Williamson MD Unavailable         Unavailable

 

                    Tuan, HAKEEM Williamson MD Unavailable         Unavailable

 

                    Tuan, HAKEEM Williamson MD Unavailable         Unavailable

 

                    Tuan, HAKEEM Williamson MD Unavailable         Unavailable

 

                    Tuan, HAKEEM Williamson MD Unavailable         Unavailable

 

                    Tuan, HAKEEM Williamson MD Unavailable         Unavailable

 

                    Tuan, A Teri TORRES Unavailable         Unavailable

 

                    Tuan, HAKEEM Williamson MD Unavailable         Unavailable

 

                    Tuan, A Teri TORRES Unavailable         Unavailable

 

                    Tuan, HAKEEM Williamson MD Unavailable         Unavailable

 

                    Tuan, HAKEEM Williamson MD Unavailable         Unavailable

 

                    Tuan, HAKEEM Williamson MD Unavailable         Unavailable

 

                    Tuan, HAKEEM Williamson MD Unavailable         Unavailable

 

                    Tuan, HAKEEM Williamson MD Unavailable         Unavailable

 

                    Tuan, A Teri TORRES Unavailable         Unavailable

 

                    Tuan, A Teri TORRES Unavailable         Unavailable

 

                    Tuan, HAKEEM Williamson MD Unavailable         Unavailable

 

                    Tuan, HAKEEM Williamson MD Unavailable         Unavailable

 

                    Tuan, HAKEEM Williamson MD Unavailable         Unavailable

 

                    Tuan, HAKEEM Williamson MD Unavailable         Unavailable

 

                    Tuan, HAKEEM Williamson MD Unavailable         Unavailable

 

                    Tuan, HAKEEM Williamson MD Unavailable         Unavailable

 

                    Tuan, HAKEEM Williamson MD Unavailable         Unavailable

 

                    PATRICK Bundy MD   Unavailable         Unavailable

 

                    PATRICK Bundy MD   Unavailable         Unavailable

 

                    PATRICK Bundy MD   Unavailable         Unavailable

 

                    PATRICK Bundy MD   Unavailable         Unavailable

 

                    PATRICK Bundy MD   Unavailable         Unavailable

 

                    PATRICK Bundy MD   Unavailable         Unavailable

 

                    PATRICK Bundy MD   Unavailable         Unavailable

 

                    PATRICK Bundy MD   Unavailable         Unavailable

 

                    PATRICK Bundy MD   Unavailable         Unavailable

 

                    PATRICK Bundy MD   Unavailable         Unavailable

 

                    PATRICK Bundy MD   Unavailable         Unavailable

 

                    PARTICK Bundy MD   Unavailable         Unavailable

 

                    PATRICK Bundy MD   Unavailable         Unavailable

 

                    PATRICK Bundy MD   Unavailable         Unavailable

 

                    PATRICK Bundy MD   Unavailable         Unavailable

 

                    PATRICK Bundy MD   Unavailable         Unavailable

 

                    PATRICK Bundy MD   Unavailable         Unavailable

 

                    PATRICK Bundy MD   Unavailable         Unavailable

 

                    PATRICK Bundy MD   Unavailable         Unavailable

 

                    PATRICK Bundy MD   Unavailable         Unavailable

 

                    PATRICK Bundy MD   Unavailable         Unavailable

 

                    PATRICK Bundy MD   Unavailable         Unavailable

 

                    PATRICK Bundy MD   Unavailable         Unavailable

 

                    PATRICK Bundy MD   Unavailable         Unavailable

 

                    PATRICK Bundy MD   Unavailable         Unavailable

 

                    PATRICK Bundy MD   Unavailable         Unavailable

 

                    PATRICK Bundy MD   Unavailable         Unavailable

 

                    PATRICK Bundy MD   Unavailable         Unavailable

 

                    PATRICK Bundy MD   Unavailable         Unavailable

 

                    PATRICK Bundy MD   Unavailable         Unavailable

 

                    PATRICK Bundy MD   Unavailable         Unavailable

 

                    PATRICK Bundy MD   Unavailable         Unavailable

 

                    PATRICK Bundy MD   Unavailable         Unavailable

 

                    PATRICK Bundy MD   Unavailable         Unavailable

 

                    PATRICK Bundy MD   Unavailable         Unavailable

 

                    PATRICK Bundy MD   Unavailable         Unavailable

 

                    PATRICK Bundy MD   Unavailable         Unavailable

 

                    PATRICK Bundy MD   Unavailable         Unavailable

 

                    PATRICK Bundy MD   Unavailable         Unavailable

 

                    PATRICK Bundy MD   Unavailable         Unavailable

 

                    PATRICK Bundy MD   Unavailable         Unavailable

 

                    PATRICK Bundy MD   Unavailable         Unavailable

 

                    PATRICK Bundy MD   Unavailable         Unavailable

 

                    PATRICK Bundy MD   Unavailable         Unavailable

 

                    PATRICK Bundy MD   Unavailable         Unavailable

 

                    PATRICK Budny MD   Unavailable         Unavailable

 

                    PATRICK Bundy MD   Unavailable         Unavailable

 

                    PATRICK Bundy MD   Unavailable         Unavailable

 

                    PATRICK Bundy MD   Unavailable         Unavailable

 

                    PATRICK Bundy MD   Unavailable         Unavailable

 

                    PATRICK Bundy MD   Unavailable         Unavailable

 

                    PATRICK Bundy MD   Unavailable         Unavailable

 

                    PATRICK Bundy MD   Unavailable         Unavailable

 

                    PATRICK Bundy MD   Unavailable         Unavailable

 

                    PATRICK Bundy MD   Unavailable         Unavailable

 

                    PATRICK Bundy MD   Unavailable         Unavailable

 

                    PATRICK Bundy MD   Unavailable         Unavailable

 

                    PATRICK Bundy MD   Unavailable         Unavailable

 

                    PATRICK Bundy MD   Unavailable         Unavailable

 

                    PATRICK Bundy MD   Unavailable         Unavailable

 

                    PATRICK Bundy MD   Unavailable         Unavailable

 

                    PATRICK Bundy MD   Unavailable         Unavailable

 

                    PATRICK Bundy MD   Unavailable         Unavailable

 

                    PATRICK Bundy MD   Unavailable         Unavailable

 

                    PATRICK Bundy MD   Unavailable         Unavailable

 

                    PATRICK Bundy MD   Unavailable         Unavailable

 

                    PATRICK Bundy MD   Unavailable         Unavailable

 

                    PATRICK Bundy MD   Unavailable         Unavailable

 

                    PATRICK Bundy MD   Unavailable         Unavailable

 

                    PATRICK Bundy MD   Unavailable         Unavailable

 

                    PATRICK Bundy MD   Unavailable         Unavailable

 

                    PATRICK Bundy MD   Unavailable         Unavailable

 

                    PATRICK Bundy MD   Unavailable         Unavailable

 

                    PATRICK Bundy MD   Unavailable         Unavailable

 

                    PATRICK Bundy MD   Unavailable         Unavailable

 

                    PATRICK Bundy MD   Unavailable         Unavailable

 

                    PATRICK Bundy MD   Unavailable         Unavailable

 

                    PATRICK Bundy MD   Unavailable         Unavailable

 

                    PATRICK Bundy MD   Unavailable         Unavailable

 

                    PATRICK Bundy MD   Unavailable         Unavailable

 

                    PATRICK Bundy MD   Unavailable         Unavailable

 

                    PATRICK Bundy MD   Unavailable         Unavailable

 

                    PATRICK Bundy MD   Unavailable         Unavailable

 

                    PATRICK Bundy MD   Unavailable         Unavailable

 

                    PATRICK Bundy MD   Unavailable         Unavailable

 

                    PATRICK Bundy MD   Unavailable         Unavailable

 

                    PATRICK Bundy MD   Unavailable         Unavailable

 

                    PATRICK Bundy MD   Unavailable         Unavailable

 

                    PATRICK Bundy MD   Unavailable         Unavailable

 

                    MISHA Salazar MD Unavailable         Unavailable

 

                    Miller, L Ana NP Unavailable         Unavailable

 

                    Miller, L Ana NP Unavailable         Unavailable

 

                    Miller, L Ana NP Unavailable         Unavailable

 

                    Miller, L Ana NP Unavailable         Unavailable

 

                    Miller, L Ana NP Unavailable         Unavailable

 

                    Miller, L Ana NP Unavailable         Unavailable

 

                    Miller, L Ana NP Unavailable         Unavailable

 

                    Miller, L Ana NP Unavailable         Unavailable

 

                    Miller, L Ana NP Unavailable         Unavailable

 

                    Miller, L Ana NP Unavailable         Unavailable

 

                    Miller, L Ana NP Unavailable         Unavailable

 

                    Miller, L Ana NP Unavailable         Unavailable

 

                    Miller, L Ana NP Unavailable         Unavailable

 

                    Miller, L Ana NP Unavailable         Unavailable

 

                    Miller, L Ana NP Unavailable         Unavailable

 

                    Miller, L Naa NP Unavailable         Unavailable

 

                    Miller, L Ana NP Unavailable         Unavailable

 

                    Miller, L Ana NP Unavailable         Unavailable

 

                    Miller, L Ana NP Unavailable         Unavailable

 

                    Miller, L Ana NP Unavailable         Unavailable

 

                    Miller, L Ana NP Unavailable         Unavailable

 

                    Miller, L Ana NP Unavailable         Unavailable

 

                    Miller, L Ana NP Unavailable         Unavailable

 

                    Miller, L Ana NP Unavailable         Unavailable

 

                    Miller, L Ana NP Unavailable         Unavailable

 

                    Miller, L Ana NP Unavailable         Unavailable

 

                    Miller, L Ana NP Unavailable         Unavailable

 

                    Miller, L Ana NP Unavailable         Unavailable

 

                    Miller, L Ana NP Unavailable         Unavailable

 

                    GLAZA, D ARABELLA NP   Unavailable         Unavailable

 

                    GLAZA, D ARABELLA NP   Unavailable         Unavailable

 

                    GLAZA, D ARABELLA NP   Unavailable         Unavailable

 

                    GLAZA, D ARABELLA NP   Unavailable         Unavailable

 

                    GLAZA, D ARABELLA NP   Unavailable         Unavailable

 

                    GLAZA, D ARABELLA NP   Unavailable         Unavailable

 

                    GLAZA, D ARABELLA NP   Unavailable         Unavailable

 

                    GLAZA, D ARABELLA NP   Unavailable         Unavailable

 

                    GLAZA, D ARABELLA NP   Unavailable         Unavailable

 

                    GLAZA, D ARABELLA NP   Unavailable         Unavailable

 

                    GLAZA, D ARABELLA NP   Unavailable         Unavailable

 

                    GLAZA, D ARABELLA NP   Unavailable         Unavailable

 

                    GLAZA, D ARABELLA NP   Unavailable         Unavailable

 

                    GLAZA, D ARABELLA NP   Unavailable         Unavailable

 

                    GLAZA, D ARABELLA NP   Unavailable         Unavailable

 

                    GLAZA, D ARABELLA NP   Unavailable         Unavailable

 

                    GLAZA, D ARABELLA NP   Unavailable         Unavailable

 

                    GLAZA, D ARABELLA NP   Unavailable         Unavailable

 

                    GLAZA, D ARABELLA NP   Unavailable         Unavailable

 

                    GLAZA, D ARABELLA NP   Unavailable         Unavailable

 

                    GLAZA, D ARABELLA NP   Unavailable         Unavailable

 

                    GLAZA, D ARABELLA NP   Unavailable         Unavailable

 

                    GLAZA, D ARABELLA NP   Unavailable         Unavailable

 

                    GLAZA, D ARABELLA NP   Unavailable         Unavailable

 

                    GLAZA, D ARABELLA NP   Unavailable         Unavailable

 

                    GLAZA, D ARABELLA NP   Unavailable         Unavailable

 

                    GLAZA, D ARABELLA NP   Unavailable         Unavailable

 

                    GLAZA, D ARABELLA NP   Unavailable         Unavailable

 

                    GLAZA, D ARABELLA NP   Unavailable         Unavailable

 

                    GLAZA, D ARABELLA NP   Unavailable         Unavailable

 

                    GLAZA, D ARABELLA NP   Unavailable         Unavailable

 

                    GLAZA, D ARABELLA NP   Unavailable         Unavailable

 

                    GLAZA, D ARABELLA NP   Unavailable         Unavailable

 

                    GLAZA, D ARABELLA NP   Unavailable         Unavailable

 

                    GLAZA, D ARABELLA NP   Unavailable         Unavailable

 

                    GLAZA, D ARABELLA NP   Unavailable         Unavailable

 

                    GLAZA, D ARABELLA NP   Unavailable         Unavailable

 

                    GLAZA, D ARABELLA NP   Unavailable         Unavailable

 

                    Petrancosta, Charlotte Raya PA-C Unavailable         Unavailabl

e

 

                    Petrancosta, Charlotte Raya PA-C Unavailable         Unavailabl

e

 

                    Petrancosta, Charlotte Raya PA-C Unavailable         Unavailabl

e

 

                    Petrancosta, Charlotte Raya PA-C Unavailable         Unavailabl

e

 

                    Petrancosta, Charlotte Raya PA-C Unavailable         Unavailabl

e

 

                    Petrancosta, Charlotte Raya PA-C Unavailable         Unavailabl

e

 

                    Petrancosta, Charlotte Raya PA-C Unavailable         Unavailabl

e

 

                    Petrancosta, Charlotte Raya PA-C Unavailable         Unavailabl

e

 

                    Petrancosta, Charlotte Raya PA-C Unavailable         Unavailabl

e

 

                    Petrancosta, Charlotte Raya PA-C Unavailable         Unavailabl

e

 

                    Petrancosta, Charlotte Raya PA-C Unavailable         Unavailabl

e

 

                    Petrancosta, Charlotte Raya PA-C Unavailable         Unavailabl

e

 

                    Petrancosta, Charlotte Raya PA-C Unavailable         Unavailabl

e

 

                    Petrancosta, Charlotte Raya PA-C Unavailable         Unavailabl

e

 

                    Petrancosta, Charlotte Raya PA-C Unavailable         Unavailabl

e

 

                    Petrancosta, Charlotte Raya PA-C Unavailable         Unavailabl

e

 

                    Petrancosta, Charlotte Raya PA-C Unavailable         Unavailabl

e

 

                    Petrancosta, Charlotte Raya PA-C Unavailable         Unavailabl

e

 

                    Petrancosta, Charlotte Raya PA-C Unavailable         Unavailabl

e

 

                    Petrancosta, Charlotte Raya PA-C Unavailable         Unavailabl

e

 

                    Petrancosta, Charlotte Raya PA-C Unavailable         Unavailabl

e

 

                    Petrancosta, Charlotte Raya PA-C Unavailable         Unavailabl

e

 

                    Petrancosta, Charlotte Raya PA-C Unavailable         Unavailabl

e

 

                    Lysius, B Midzy NP  Unavailable         Unavailable

 

                    Lysius, B Midzy NP  Unavailable         Unavailable

 

                    Lysius, B Midzy NP  Unavailable         Unavailable

 

                    Lysius, B Midzy NP  Unavailable         Unavailable

 

                    Lysius, B Midzy NP  Unavailable         Unavailable

 

                    Lysius, B Midzy NP  Unavailable         Unavailable

 

                    Lysius, B Midzy NP  Unavailable         Unavailable

 

                    Lysius, B Midzy NP  Unavailable         Unavailable

 

                    Lysius, B Midzy NP  Unavailable         Unavailable

 

                    Lysius, B Midzy NP  Unavailable         Unavailable

 

                    Lysius, B Midzy NP  Unavailable         Unavailable

 

                    Lysius, B Midzy NP  Unavailable         Unavailable

 

                    Lysius, B Midzy NP  Unavailable         Unavailable

 

                    Lysius, B Midzy NP  Unavailable         Unavailable

 

                    Lysius, B Midzy NP  Unavailable         Unavailable

 

                    Lysius, B Midzy NP  Unavailable         Unavailable

 

                    Lysius, B Midzy NP  Unavailable         Unavailable

 

                    Lysius, B Midzy NP  Unavailable         Unavailable

 

                    Lysius, B Midzy NP  Unavailable         Unavailable

 

                    Lysius, B Midzy NP  Unavailable         Unavailable

 

                    Lysius, B Midzy NP  Unavailable         Unavailable

 

                    Lysius, B Midzy NP  Unavailable         Unavailable

 

                    Lysius, B Midzy NP  Unavailable         Unavailable

 

                    Lysius, B Midzy NP  Unavailable         Unavailable

 

                    Lysius, B Midzy NP  Unavailable         Unavailable

 

                    Lysius, B Midzy NP  Unavailable         Unavailable

 

                    Lysius, B Midzy NP  Unavailable         Unavailable

 

                    Lysius, B Midzy NP  Unavailable         Unavailable

 

                    Ramon, L Sharri FNP Unavailable         Unavailable

 

                    [place], L Sharri FNP Unavailable         Unavailable

 

                    Ramon, L Sharri FNP Unavailable         Unavailable

 

                    Ramon, L Sharri FNP Unavailable         Unavailable

 

                    [place], L Sharri FNP Unavailable         Unavailable

 

                    Ramon, L Sharri FNP Unavailable         Unavailable

 

                    Ramon, L Sharri FNP Unavailable         Unavailable

 

                    Ramon, L Sharri FNP Unavailable         Unavailable

 

                    [place], L Sharri FNP Unavailable         Unavailable

 

                    Ramon, L Sharri FNP Unavailable         Unavailable

 

                    Ramon, L Sharri FNP Unavailable         Unavailable

 

                    [place], L Sharri FNP Unavailable         Unavailable

 

                    [place], L Sharri FNP Unavailable         Unavailable

 

                    [place], L Sharri FNP Unavailable         Unavailable

 

                    Ramon, L Sharri FNP Unavailable         Unavailable

 

                    Ramon, L Sharri FNP Unavailable         Unavailable

 

                    Ramon, L Sharri FNP Unavailable         Unavailable

 

                    [place], L Sharri FNP Unavailable         Unavailable

 

                    Ramon, L Sharri FNP Unavailable         Unavailable

 

                    Ramon, L Sharri FNP Unavailable         Unavailable

 

                    Ramon, L Sharri FNP Unavailable         Unavailable

 

                    Ramon, L Sharri FNP Unavailable         Unavailable

 

                    [place], L Sharri FNP Unavailable         Unavailable

 

                    Ramon, L Sharri FNP Unavailable         Unavailable

 

                    Ramon, L Sharri FNP Unavailable         Unavailable

 

                    [place], L Sharri FNP Unavailable         Unavailable

 

                    [place], L Sharri FNP Unavailable         Unavailable

 

                    Ramon, L Sharri FNP Unavailable         Unavailable

 

                    [place], L Sharri FNP Unavailable         Unavailable

 

                    [place], L Sharri FNP Unavailable         Unavailable

 

                    [place], L Sharri FNP Unavailable         Unavailable

 

                    [place], L Sharri FNP Unavailable         Unavailable

 

                    [place], L Sharri FNP Unavailable         Unavailable

 

                    [place], L Sharri FNP Unavailable         Unavailable

 

                    Ramon, L Sharri FNP Unavailable         Unavailable

 

                    Ramon, L Sharri FNP Unavailable         Unavailable

 

                    [place], L Sharri FNP Unavailable         Unavailable

 

                    Ramon, L Sharri FNP Unavailable         Unavailable

 

                    [place], L Sharri FNP Unavailable         Unavailable

 

                    Ramon, L Sharri FNP Unavailable         Unavailable

 

                    Salazar,  Salinas  Unavailable         [phone]

 

                    Salazar,  Salinas  Unavailable         [phone]

 

                    Salazar,  Salinas  Unavailable         [phone]

 

                    Salazar,  Salinas  Unavailable         [phone]

 

                    Salazar,  Salinas  Unavailable         [phone]

 

                    Salazar,  Salinas  Unavailable         [phone]

 

                    Salazar,  Salinas  Unavailable         [phone]

 

                    Salazar,  Salinas  Unavailable         [phone]

 

                    Salazar,  Salinas  Unavailable         [phone]

 

                    Salazar,  Salinas  Unavailable         [phone]

 

                    Salazar,  Salinas  Unavailable         [phone]

 

                    Salazar,  Salinas  Unavailable         [phone]

 

                    Salazar,  Salinas  Unavailable         [phone]

 

                    Salazar,  Salinas  Unavailable         [phone]

 

                    Salazar,  Salinas  Unavailable         [phone]

 

                    Salazar,  Salinas  Unavailable         [phone]

 

                    Salazar,  Salinas  Unavailable         [phone]

 

                    Salazar,  Salinas  Unavailable         [phone]

 

                    Salazar,  Salinas  Unavailable         [phone]

 

                    Salazar,  Salinas  Unavailable         [phone]

 

                    Salazar,  Salinas  Unavailable         [phone]

 

                    Salazar,  Salinas  Unavailable         [phone]

 

                    Salazar,  Salinas  Unavailable         [phone]

 

                    Salazar,  Salinas  Unavailable         [phone]

 

                    Salazar,  Salinas  Unavailable         [phone]

 

                    Salazar,  Salinas  Unavailable         [phone]

 

                    Salazar,  Salinas  Unavailable         [phone]

 

                    CEHO,  ZORYANA NP Unavailable         Unavailable

 

                    CHEO,  ZORYANA NP Unavailable         Unavailable

 

                    CHEO,  ZORYANA NP Unavailable         Unavailable

 

                    CHEO,  ZORYANA NP Unavailable         Unavailable

 

                    CHEO,  ZORYANA NP Unavailable         Unavailable

 

                    CHEO,  ZORYANA NP Unavailable         Unavailable

 

                    CHEO,  ZORYANA NP Unavailable         Unavailable

 

                    CHEO,  ZORYANA NP Unavailable         Unavailable

 

                    CHEO,  ZORYANA NP Unavailable         Unavailable

 

                    CHEO,  ZORYANA NP Unavailable         Unavailable

 

                    CHEO,  ZORYANA NP Unavailable         Unavailable

 

                    CHEO,  ZORYANA NP Unavailable         Unavailable

 

                    CHEO,  ZORYANA NP Unavailable         Unavailable

 

                    CHEO,  ZORYANA NP Unavailable         Unavailable

 

                    CHEO,  ZORYANA NP Unavailable         Unavailable

 

                    CHEO,  ZORYANA NP Unavailable         Unavailable

 

                    CHEO,  ZORYANA NP Unavailable         Unavailable

 

                    CHEO,  ZORYANA NP Unavailable         Unavailable

 

                    CHEO,  ZORYANA NP Unavailable         Unavailable

 

                    CHEO,  ZORYANA NP Unavailable         Unavailable

 

                    CHEO,  ZORYANA NP Unavailable         Unavailable

 

                    CHEO,  ZORYANA NP Unavailable         Unavailable

 

                    CHEO,  ZORYANA NP Unavailable         Unavailable

 

                    CHEO,  ZORYANA NP Unavailable         Unavailable

 

                    CHEO,  ZORYANA NP Unavailable         Unavailable

 

                    CHEO,  ZORYANA NP Unavailable         Unavailable

 

                    CHEO,  ZORYANA NP Unavailable         Unavailable

 

                    CHEO,  ZORYANA NP Unavailable         Unavailable

 

                    CHEO,  ZORYANA NP Unavailable         Unavailable

 

                    CHEO,  ZORYANA NP Unavailable         Unavailable

 

                    CHEO,  ZORYANA NP Unavailable         Unavailable

 

                    CHEO,  ZORYANA NP Unavailable         Unavailable

 

                    CHEO,  ZORYANA NP Unavailable         Unavailable

 

                    CHEO,  ZORYANA NP Unavailable         Unavailable

 

                    CHEO,  ZORYANA NP Unavailable         Unavailable

 

                    CHEO,  ZORYANA NP Unavailable         Unavailable

 

                    CHEO,  ZORYANA NP Unavailable         Unavailable

 

                    CHEO,  ZORYANA NP Unavailable         Unavailable



                                  



Re-disclosure Warning

          The records that you are about to access may contain information from 
federally-assisted alcohol or drug abuse programs. If such information is 
present, then the following federally mandated warning applies: This information
has been disclosed to you from records protected by federal confidentiality 
rules (42 CFR part 2). The federal rules prohibit you from making any further 
disclosure of this information unless further disclosure is expressly permitted 
by the written consent of the person to whom it pertains or as otherwise 
permitted by 42 CFR part 2. A general authorization for the release of medical 
or other information is NOT sufficient for this purpose. The Federal rules 
restrict any use of the information to criminally investigate or prosecute any 
alcohol or drug abuse patient.The records that you are about to access may 
contain highly sensitive health information, the redisclosure of which is 
protected by Article 27-F of the [facility] Public Health law. If you 
continue you may have access to information: Regarding HIV / AIDS; Provided by 
facilities licensed or operated by the [facility] Office of Mental Health; 
or Provided by the [facility] Office for People With Developmental 
Disabilities. If such information is present, then the following [place] 
mandated warning applies: This information has been disclosed to you from 
confidential records which are protected by state law. State law prohibits you 
from making any further disclosure of this information without the specific 
written consent of the person to whom it pertains, or as otherwise permitted by 
law. Any unauthorized further disclosure in violation of state law may result in
a fine or MCC sentence or both. A general authorization for the release of 
medical or other information is NOT sufficient authorization for further disc
losure.                                                                         
    



Allergies and Adverse Reactions

          



           Type       Description Substance  Reaction   Status     Data Source(s

)

 

           Allergy to substance Allergy to substance Allergy to substance       

                [facility] ([facility])



                                                                                
       



Family History

          



             Family Member Name Family Member Gender Family Member Status Date o

f Status 

Description                             Data Source(s)

 

           Unknown    Male       Problem                          [facility] ([facility]

 

           Unknown    Male       Problem                          [facility] ([facility], )

 

                                        () 

 

           Unknown    Female     Problem                          [facility]

 

           Unknown    Female     Problem                          [facility]

 

           Unknown    Female     Problem                          [facility]



                                                                                
                           



Encounters

          



           Encounter  Providers  Location   Date       Indications Data Source(s

)

 

                Outpatient      Attender: Raya Avelar PA-C Main Office   

  2021 08:15:00 AM 

EST                                                 [facility] ([facility]

 

                          Osmani Bundy MD: [address] [place] [id number]

[phone] 

Attender: Osmani Bundy MD  [facility] 

2021 12:00:00 AM EST                           [facility] ([facility])

 

           Outpatient Attender: Teri Dickerson MD Main Office 2021 03:45:0

0 PM EST            

[facility] ([facility]

 

                Outpatient      Attender: ARABELLA BROWN NPReferrer: Teri Dickerson MD                 12/10/2020 

01:47:28 PM EST                                     [facility] 

[place]

 

                Outpatient      Attender: ARABELLA BROWN NPReferrer: Teri Dickerson MD                 10/28/2020 

08:57:23 AM EDT                                     [facility]

 

                Outpatient      Attender: Sharri Navarro FNPReferrer: Teri langley MD                 10/09/2020 

04:06:53 PM EDT                                     [facility]

 

                Outpatient      Attender: ARABELLA BROWN NPReferrer: Teri Dickerson MD                 09/10/2020 

08:23:42 AM EDT                                     [facility]

 

                Outpatient      Attender: Valentina Ramirez NPReferrer: Teri vegas MD                 2020 

03:07:09 PM EDT                                     [facility]

 

                Outpatient      Attender: ARABELLA BROWN NPReferrer: Teri Dickerson MD                 07/10/2020 

10:05:06 AM EDT                                     [facility]

 

           Outpatient Attender: Teri Dickerson MD Main Office 2020 11:40:0

0 AM EDT            

[facility] ([facility]

 

                Outpatient      Attender: ARABELLA BROWN NPReferrer: Teri Dickerson MD                 2020 

08:27:02 AM EDT                                     [facility]

 

                Outpatient      Attender: ARABELLA BROWN NPReferrer: Teri Dickerson MD                 2020 

06:52:40 PM EDT                                     [facility]

 

                Outpatient      Attender: Ana Miller NPReferrer: Teri Lane ams, MD                 2020 

02:51:37 PM EDT                                     [facility]

 

                Outpatient      Attender: ARABELLA BROWN NPReferrer: Teri Dickesron MD                 2020 

12:20:32 PM EST                                     [facility]

 

                Recurring Patient Attender: ARABELLA BROWN NPReferrer: [place] cleary MD                 

2020 03:14:28 PM EST                           [facility]

 

                Outpatient      Attender: DAVE NAVARRO NPReferrer: Teri Lane ams, MD                 2020 

08:52:56 PM EST                                     [facility]

 

                Outpatient      Attender: ARABELLA BROWN NPReferrer: Teri Dickerson MD                 2020 

04:27:55 PM EST                                     [facility]

 

                Outpatient      Attender: Sharri Navarro FNPReferrer: [place] roy                 2019 

08:31:05 AM EST                                     [facility]



                                                                                
                                                                                
                                                                                
               



Immunizations

          



             Vaccine      Date         Status       Description  Data Source(s)

 

             New in .  IIV4 10/16/2020 07:37:00 AM EDT completed            

     [facility]

 

             New in .  IIV4 2019 12:27:00 PM EST completed            

     [facility]



                                                                                
                 



Medications

          



          Medication Brand Name Start Date Product Form Dose      Route     Admi

nistrative 

Instructions Pharmacy Instructions Status     Indications Reaction   Description

 Data 

Source(s)

 

                    Ergocalciferol 97287 UNT Oral Capsule Vitamin D (Ergocalcife

rol) 2021 

12:00:00 AM EST               ORAL                 active                      M

[facility] ([facility]

 

           mg           2021 12:00:00 AM EST tablet    60           

       TAKE ONE TABLET BY MOUTH 

TWICE A DAY AS NEEDED FOR PAIN, MAXIMUM DAILY DOSE = TWO TABLETS TAKE ONE TABLET

BY MOUTH TWICE A DAY AS NEEDED FOR PAIN, MAXIMUM DAILY DOSE = TWO TABLETS SOLD: 

2021                                                      [facility]

 

          15 mg               2021 12:00:00 AM EST tablet    90           

       TAKE ONE TABLET BY MOUTH EVERY 8 

HOURS MAXIMUM DAILY DOSE = THREE TABLETS TAKE ONE TABLET BY MOUTH EVERY 8 HOURS 

MAXIMUM DAILY DOSE = THREE TABLETS SOLD: 2021                             

           [facility]

 

           mg           2020 12:00:00 AM EST tablet    60           

       TAKE ONE TABLET BY MOUTH 

TWICE A DAY AS NEEDED FOR PAIN MAXIMUM DAILY DOSE = 2 TAKE ONE TABLET BY MOUTH 

TWICE A DAY AS NEEDED FOR PAIN MAXIMUM DAILY DOSE = 2 SOLD: 2020          

                              

[facility]

 

          15 mg               2020 12:00:00 AM EST tablet extended release

 90                  TAKE ONE TABLET 

BY MOUTH EVERY 8 HOURS MAXIMUM DAILY DOSE = 3 TAKE ONE TABLET BY MOUTH EVERY 8 

HOURS MAXIMUM DAILY DOSE = 3 SOLD: 2020                                   

     [facility]

 

           mg           2020 12:00:00 AM EST tablet    90           

       TAKE ONE TABLET BY MOUTH 

THREE TIMES A DAY AS NEEDED FOR PAIN MAXIMUM DAILY DOSE = 3 TABLETS TAKE ONE 

TABLET BY MOUTH THREE TIMES A DAY AS NEEDED FOR PAIN MAXIMUM DAILY DOSE = 3 
TABLETS      SOLD: 2020                                        [facility]

s

 

          15 mg               2020 12:00:00 AM EST tablet extended release

 90                  TAKE ONE TABLET 

BY MOUTH EVERY 8 HOURS MAXIMUM DAILY DOSE = 3 TABLETS TAKE ONE TABLET BY MOUTH 

EVERY 8 HOURS MAXIMUM DAILY DOSE = 3 TABLETS SOLD: 2020                   

                     [facility]

 

           mg           10/09/2020 12:00:00 AM EDT tablet    90           

       TAKE ONE TABLET BY MOUTH 

THREE TIMES A DAY AS NEEDED FOR PAIN MAXIMUM DAILY DOSE = 3 TAKE ONE TABLET BY 

MOUTH THREE TIMES A DAY AS NEEDED FOR PAIN MAXIMUM DAILY DOSE = 3 SOLD: 

10/09/2020                                                      [facility]

 

          15 mg               10/09/2020 12:00:00 AM EDT tablet extended release

 90                  TAKE ONE TABLET 

BY MOUTH EVERY 8 HOURS MAXIMUM DAILY DOSE = 3 TAKE ONE TABLET BY MOUTH EVERY 8 

HOURS MAXIMUM DAILY DOSE = 3 SOLD: 10/09/2020                                   

     [facility]

 

           mg           2020 12:00:00 AM EDT tablet    90           

       TAKE ONE TABLET BY MOUTH 

THREE TIMES A DAY AS NEEDED FOR PAIN MAXIMUM DAILY DOSE = 3 TAKE ONE TABLET BY 

MOUTH THREE TIMES A DAY AS NEEDED FOR PAIN MAXIMUM DAILY DOSE = 3 SOLD: 

2020                                                      [facility]

 

                    Cyclobenzaprine hydrochloride 10 MG Oral Tablet CYCLOBENZAPR

INE HCL 2020 

12:00:00 AM EDT tablet          15                              TAKE ONE TABLET 

BY MOUTH THREE TIMES A DAY AS NEEDED

FOR SPASMS MAXIMUM DAILY DOSE = 3       TAKE ONE TABLET BY MOUTH THREE TIMES A D

AY AS 

NEEDED FOR SPASMS MAXIMUM DAILY DOSE = 3 SOLD: 2020                       

                 [facility]

 

          15 mg               2020 12:00:00 AM EDT tablet extended release

 90                  TAKE ONE TABLET 

BY MOUTH EVERY 8 HOURS MAXIMUM DAILY DOSE = 3 TAKE ONE TABLET BY MOUTH EVERY 8 

HOURS MAXIMUM DAILY DOSE = 3 SOLD: 2020                                   

     [facility]

 

           mg           2020 12:00:00 AM EDT tablet    90           

       TAKE ONE TABLET BY MOUTH 

THREE TIMES A DAY AS NEEDED FOR PAIN MAXIMUM DAILY DOSE = 3 TABLETS TAKE ONE 

TABLET BY MOUTH THREE TIMES A DAY AS NEEDED FOR PAIN MAXIMUM DAILY DOSE = 3 
TABLETS      SOLD: 2020                                        [facility]

s

 

          15 mg               2020 12:00:00 AM EDT tablet extended release

 90                  TAKE ONE TABLET 

BY MOUTH EVERY 8 HOURS MAXIMUM DAILY DOSE = 3 TABLETS TAKE ONE TABLET BY MOUTH 

EVERY 8 HOURS MAXIMUM DAILY DOSE = 3 TABLETS SOLD: 2020                   

                     [facility]

 

          15 mg               2020 12:00:00 AM EDT tablet extended release

 90                  TAKE ONE TABLET 

BY MOUTH EVERY 8 HOURS MAXIMUM DAILY DOSE = THREE TABLETS TAKE ONE TABLET BY 

MOUTH EVERY 8 HOURS MAXIMUM DAILY DOSE = THREE TABLETS SOLD: 2020         

                               

[facility]

 

           mg           2020 12:00:00 AM EDT tablet    90           

       TAKE ONE TABLET BY MOUTH 

THREE TIMES A DAY AS NEEDED MAXIMUM DAILY DOSE = THREE TABLETS TAKE ONE TABLET 

BY MOUTH THREE TIMES A DAY AS NEEDED MAXIMUM DAILY DOSE = THREE TABLETS SOLD: 

2020                                                      [facility]

 

           mg           2020 12:00:00 AM EDT tablet    90           

       TAKE ONE TABLET BY MOUTH 

THREE TIMES A DAY AS NEEDED FOR PAIN MAXIMUM DAILY DOSE = 3 TABLETS TAKE ONE 

TABLET BY MOUTH THREE TIMES A DAY AS NEEDED FOR PAIN MAXIMUM DAILY DOSE = 3 
TABLETS      SOLD: 2020                                        [facility]

s

 

          15 mg               2020 12:00:00 AM EDT tablet extended release

 90                  TAKE ONE TABLET 

BY MOUTH EVERY 8 HOURS MAXIMUM DAILY DOSE = 3 TABLETS TAKE ONE TABLET BY MOUTH 

EVERY 8 HOURS MAXIMUM DAILY DOSE = 3 TABLETS SOLD: 2020                   

                     [facility]

 

           mg           2020 12:00:00 AM EDT tablet    60           

       TAKE ONE TABLET BY MOUTH 

EVERY 12 HOURS AS NEEDED FOR PAIN MAXIMUM DAILY DOSE = 2 TAKE ONE TABLET BY 

MOUTH EVERY 12 HOURS AS NEEDED FOR PAIN MAXIMUM DAILY DOSE = 2 SOLD: 2020 

         

                                                            [facility]

 

          15 mg               2020 12:00:00 AM EDT tablet extended release

 90                  TAKE ONE TABLET 

BY MOUTH EVERY 8 HOURS MAXIMUM DAILY DOSE = 3 TAKE ONE TABLET BY MOUTH EVERY 8 

HOURS MAXIMUM DAILY DOSE = 3 SOLD: 2020                                   

     [facility]

 

           mg           2020 12:00:00 AM EDT tablet    60           

       TAKE ONE TABLET BY MOUTH 

EVERY 12 HOURS AS NEEDED FOR PAIN MAXIMUM DAILY DOSE = 2 TABLETS TAKE ONE TABLET

BY MOUTH EVERY 12 HOURS AS NEEDED FOR PAIN MAXIMUM DAILY DOSE = 2 TABLETS SOLD: 

2020                                                      [facility]

 

          15 mg               2020 12:00:00 AM EDT tablet extended release

 90                  TAKE ONE TABLET 

BY MOUTH EVERY 8 HOURS MAXIMUM DAILY DOSE = 3 TABLETS TAKE ONE TABLET BY MOUTH 

EVERY 8 HOURS MAXIMUM DAILY DOSE = 3 TABLETS SOLD: 2020                   

                     [facility]

 

          15 mg               2020 12:00:00 AM EST tablet extended release

 90                  TAKE ONE TABLET 

BY MOUTH EVERY 8 HOURS MAXIMUM DAILY DOSE = 3 TABLETS TAKE ONE TABLET BY MOUTH 

EVERY 8 HOURS MAXIMUM DAILY DOSE = 3 TABLETS SOLD: 2020                   

                     [facility]

 

           mg           2020 12:00:00 AM EST tablet    60           

       TAKE ONE TABLET BY MOUTH 

EVERY 12 HOURS AS NEEDED FOR PAIN MAXIMUM DAILY DOSE = 2 TABLETS TAKE ONE TABLET

BY MOUTH EVERY 12 HOURS AS NEEDED FOR PAIN MAXIMUM DAILY DOSE = 2 TABLETS SOLD: 

2020                                                      [facility]

 

          15 mg               2020 12:00:00 AM EST tablet extended release

 90                  TAKE ONE TABLET 

BY MOUTH EVERY 8 HOURS MAXIMUM DAILY DOSE = THREE TABLETS TAKE ONE TABLET BY 

MOUTH EVERY 8 HOURS MAXIMUM DAILY DOSE = THREE TABLETS SOLD: 2020         

                               

[facility]

 

           mg           2020 12:00:00 AM EST tablet    60           

       TAKE ONE TABLET BY MOUTH 

EVERY 12 HOURS AS NEEDED FOR PAIN MAXIMUM DAILY DOSE = TWO TABLETS TAKE ONE 

TABLET BY MOUTH EVERY 12 HOURS AS NEEDED FOR PAIN MAXIMUM DAILY DOSE = TWO 
TABLETS      SOLD: 2020                                        [facility]

s

 

           mg           2020 12:00:00 AM EST tablet    60           

       TAKE ONE TABLET BY MOUTH 

EVERY 12 HOURS AS NEEDED FOR PAIN MAXIMUM DAILY DOSE = 2 TABLETS TAKE ONE TABLET

BY MOUTH EVERY 12 HOURS AS NEEDED FOR PAIN MAXIMUM DAILY DOSE = 2 TABLETS SOLD: 

2020                                                      [facility]

 

          15 mg               2020 12:00:00 AM EST tablet extended release

 90                  TAKE ONE TABLET 

BY MOUTH EVERY 8 HOURS MAXIMUM DAILY DOSE = 3 TABLETS TAKE ONE TABLET BY MOUTH 

EVERY 8 HOURS MAXIMUM DAILY DOSE = 3 TABLETS SOLD: 2020                   

                     [facility]

 

           mg           2019 12:00:00 AM EST tablet    60           

       TAKE ONE TABLET BY MOUTH 

EVERY 12 HOURS AS NEEDED FOR PAIN MAXIMUM DAILY DOSE = 2 TAKE ONE TABLET BY 

MOUTH EVERY 12 HOURS AS NEEDED FOR PAIN MAXIMUM DAILY DOSE = 2 SOLD: 2019 

         

                                                            [facility]

 

          15 mg               2019 12:00:00 AM EST tablet extended release

 90                  TAKE ONE TABLET 

BY MOUTH EVERY 8 HOURS MAXIMUM DAILY DOSE = 3 TAKE ONE TABLET BY MOUTH EVERY 8 

HOURS MAXIMUM DAILY DOSE = 3 SOLD: 2019                                   

     [facility]



                                                                                
                                                                                
                                                                                
                                                                                
                                  



Insurance Providers

          



             Payer name   Policy type / Coverage type Policy ID    Covered party

 ID Covered 

party's relationship to lopez Policy Lopez             Plan Information

 

          [facility]           [id number]           [facility]                 [id number]

[id number]

 

          MEDICARE            [id number]                             [id number]

A

 

          MEDICARE  C         [id number]           S                   [id number]

[id number]

 

           FOR LIFE O         [id number]           S                   [id number]

[id number]

 

          Medicare  C         [id number]           SELF                [id number]

[id number]

 

           For Life F         [id number]           SELF                [id number]

[id number]

 

          Medicare Dme Medigap Part B [id number]           Self                

[id number]

 

           For Life Commercial [id number]           Family Dependent      

     [id number]

 

          Medicare  Medicare Primary [id number]           Self                [id number]

[id number]

 

           For Life Medigap Part B [id number]           Family Dependent  

         [id number]

 

          Medicare [place] Medicare Primary [id number]           Self          

      [id number]

 

          DME Jurisdiction A [facility] C         [id number]           SELF          

      [id number]

 

          Medicare  C         [id number]           SELF                [id number]

[id number]

 

           For Life F         [id number]           SPOUSE              0

[id number]

 

          DME Jurisdiction A [facility] C         [id number]           SELF           

     [id number]

 

          Medicare  C         [id number]           SELF                [id number]

A

 

          Medicare Dme Medigap Part B [id number]           Self                

[id number]

 

           For Life Commercial [id number]           Family Dependent      

     [id number]

 

          Medicare Medicare Primary [id number]           Self                [id number]

[id number]

 

           For Life Medigap Part B [id number]           Family Dependent  

         [id number]

 

          Medicare [place] Medicare Primary [id number]           Self           

     [id number]

 

          Medicare  C         [id number]           SELF                [id number]

A

 

          Medicare Dme Medigap Part B [id number]           Self                

[id number]

 

           For Life Commercial [id number]           Family Dependent      

     [id number]

 

          Medicare  Medicare Primary [id number]           Self                [id number]

[id number]

 

           For Life Medigap Part B [id number]           Family Dependent  

         [id number]

 

          Medicare [place] Medicare Primary [id number]           Self           

     [id number]

 

          [employer]  For Life Medigap Part B [id number]           Family Depende

nt           [id number]

 

          Medicare [place]/[facility] Medicare Primary [id number]           Self       

         [id number]

 

           For Life Medigap Part B [id number]           Family Dependent  

         [id number]

 

          Medicare [place] Medicare Primary [id number]           Self           

     [id number]

 

           For Life Medigap Part B [id number]           Family Dependent  

         [id number]

 

          Medicare [place] Medicare Primary [id number]           Self           

     [id number]

 

          [employer]  For Life Medigap Part B [id number]           Family Depende

nt           [id number]

 

          Medicare [place]/[employer] Medicare Primary [id number]           Self       

         [id number]

 

          MEDICARE  C         [id number]           S                   [id number]

A

 

          [facility] MEDICARE PART B C         [id number]           S               

    [id number]

 

          [facility] C         [id number]           S              

     [id number]

 

            [facility] [place]           [id number]           2          

       [id number]

 

          [employer]  [place] O         [id number]           P               

    [id number]

 

          [employer]ALTH[employer]/ AD O         [id number]           P                  

 [id number]

 

           For Life Medigap Part B [id number]           Family Dependent  

         [id number]

 

          Medicare [place] Medicare Primary [id number]           Self           

     [id number]

 

           For Life Medigap Part B [id number]           Family Dependent  

         [id number]

 

          Medicare [place] Medicare Primary [id number]           Self           

     [id number]

 

           For Life Medigap Part B [id number]           Family Dependent  

         [id number]

 

          Medicare [place] Medicare Primary [id number]           Self           

     [id number]

 

           For Life Medigap Part B [id number]           Family Dependent  

         [id number]

 

          Medicare [place] Medicare Primary [id number]           Self           

     [id number]

 

                       [id number]           Spo                 [id number]

 

          MEDICARE            [id number]           Jayleen                 [id number]

A

 

           For Life Medigap Part B                     Family Dependent  

          

 

          Medicare [place] Medicare Primary                     Self            

     

 

           For Life F         [id number]           SPOUSE              [id number]

[id number]

 

          [employer] Medigap Part B                     Family Depe

ndent            

 

            [employer] [place] S         [id number]           P              

     [id number]

 

                              [id number]                               [id number]

A

 

                              [id number]                               [id number]



                                                                                
                                                                                
                                                                                
                                                                                
                                                                                
                                                                                
                                                                                
                                                                                
                    



Surgeries/Procedures

          



             Procedure    Description  Date         Indications  Data Source(s)

 

             Bone Mineral Density Test              2020 12:00:00 AM EDT  

            [place] ([facility]

 

                                        [facility] RadiologyXray: 05/11/15 - Bone 

Dens.Study (Dexa),1 Or More-5 year f/u 

 

             Mammogram                 2020 12:00:00 AM EDT              [place] ([facility]

 

                                        Xray: 18 - Mammography, Bilateral 



                                                                                
                 



Results

          



                    ID                  Date                Data Source

 

                    [id number]          2021 12:00:00 AM EST [facility]

ology Imaging

 

                                        

________________________________________________________________________________

[facility] MD            Patient Name: YUSRA DIETZ [id number]         
          YOB: 1960[place]  [address]              Date of 
Exam: 1P#: [phone]: 
[phone]______________________________________________________________________

__________EXAM: CHEST (2 VIEW) X-RAYCLINICAL INFORMATION:   Dyspnea.Two views.  
These images were obtained using digital radiography.The latest prior for 
comparison 2013.The interstitial markings are diffusely increased.  This 
represents achange from the prior exam.  There is cardiomegaly which appears to 
haveincreased from the prior exam.  There has been interim development ofpatchy 
opacities in the left lower lobe silhouetting out a portion of thediaphragmatic 
surface of the left lung.  There is no significant change inthe appearance of 
the osseous structures.IMPRESSION:There evidence of cardiomegaly and 
interstitial edema with a left lowerlobe patchy opacity as described above.  The
opacity could represent eitherconcomitant pneumonia or asymmetric pulmonary 
edema.  This should becorrelated clinically with appropriate followup.Rasta Rosa, MEGA/Sandra you for referring YUSRA DIETZ to our office.   
Electronically Signed - RASTA ROSA DO  21 17:58   









          Name      Value     Range     Interpretation Code Description Data Alla

rce(s) Supporting 

Document(s)

 

                                                                       









                    ID                  Date                Data Source

 

                    [id number] 2021 12:01:00 PM 

EST [facility] ([facility])









          Name      Value     Range     Interpretation Code Description Data Alla

rce(s) Supporting 

Document(s)

 

           sars-cov-2 negative   negative   normal     Sars-cov-2 [place] ([facility])                           









                    ID                  Date                Data Source

 

                    [id number]               2021 11:56:00 AM EST [facility]









          Name      Value     Range     Interpretation Code Description Data Alla

rce(s) Supporting 

Document(s)

 

                                        SARS coronavirus 2 RdRp gene [Presence] 

in Respiratory specimen by JOE with 

probe detection Not detected                                  [facility]      

 

                                        This lab was ordered by [facility] and reported by [facility]. 









                    ID                  Date                Data Source

 

                    [id number]         2020 04:56:00 PM EST [facility]

 

                                        PATIENT MR#:   [id number] NAME:  Yusra Sauer MDATE OF BIRTH: 

1960REFERRING PHYSICIAN: SOS OutsideEXAM DATE:        2020EXAM: 
LUMBAR SPINE MRI WITHOUT CONTRASTINDICATION: Pain in the neck, mid back and low 
back, chronic..COMPARISON: MRI 1/15/2016TECHNIQUE: MRI scan of the lumbar spine 
was performed on a 1.5T GE Scanner usingvarious scan planes and pulse sequences.
 FINDINGS: The lumbar vertebral bodies are normal in height. No fracture 
ordestructive bone lesion. No lesion seen in the conus.  Benign hemangiomata 
T12and L1 vertebral bodies unchanged.T12-L1: Mild disc bulge.  Moderate anterior
 endplate osteophytes.  1 mmretrolisthesis.  Mild thecal sac compression.  
Slightly worse.L1-2: 2 mm retrolisthesis.  Mild disc bulge.  Mild thecal sac 
compression. Foramina are patent.  Unchanged.L2-3: Unremarkable.L3-4: Broad-
based left central disc protrusion.  Mild posterior displacementleft L4 nerve 
root.  Moderate facet arthropathy and ligamentum flavumhypertrophy. Progressive 
mild to moderate canal stenosis.  Mild stenosis of thelateral recesses and 
foramina.L4-5: Severe facet arthropathy.  Moderate ligamentum flavum 
hypertrophy.  3 mmanterolisthesis.  Disc is uncovered and bulging.  Mild canal 
stenosis.  Mildnarrowing of the lateral recesses and foramina bilaterally.  
Unchanged.L5-S1: Severe facet arthropathy.  4 mm anterolisthesis.  Mild disc 
bulge.  Nocanal stenosis.  Mild bilateral foraminal stenosis.  These findings 
haveprogressed.IMPRESSION:1.  L3-4 left central disc protrusion with mild mass-
effect on the left L4 nerveroot.  This is mildly larger.  There is also 
progressive mild to moderate canalstenosis.2.  L4-5 unchanged severe facet 
arthropathy, grade 1 anterolisthesis and mildstenosis.3.  L5-S1 progressive 
severe facet arthropathy and grade 1 anterolisthesis. Mild bilateral foraminal 
stenosis.Read by:        ROSA GONZALEZTranscribed by: ROSA GONZALEZTranscribed Date: 2020 4:56:21 PMElectronically signed by: ROSA GONZALEZDate signed: 2020 4:57:47 PM 









          Name      Value     Range     Interpretation Code Description Data Alla

rce(s) Supporting 

Document(s)

 

                                                                       









                    ID                  Date                Data Source

 

                    [id number]         2020 04:58:00 PM EST [facility]

 

                                        PATIENT MR#:   [id number] NAME:  Yusra Sauer MDATE OF BIRTH: 

1960REFERRING PHYSICIAN: SOS OutsideEXAM DATE:        2020EXAM: MR 
Thoracic Spine without contrastINDICATION: Thoracic back painCOMPARISON: No 
comparison availableTECHNIQUE: Multiplanar multisequence MR imaging of the 
thoracic spine wasperformed on a 1.5T Zase MR unit using various scan planes and 
pulse sequences. Intravenous contrast was not administered.FINDINGS: 3 mm grade 
1 anterolisthesis of C7 on T1 and minimal grade 1anterolisthesis T1 on T2.  The 
thoracic kyphosis is preserved.  The vertebralbody heights are normal.  No 
compression deformity or lumbar fracture.There is a nonaggressive hyperintense 
T1 and T2 signal void 24 mm T12 vertebralbody hemangioma.  Heterogeneous 
appearance of the T2 vertebral body likelyrepresent a large underlying T2 
hemangioma.  Indeterminate 14 mm intermediate T1and T2 signal ovoid T8 vertebral
 body lesion.  Further evaluation andcharacterization with whole body nuclear 
medicine bone scan is advised toexclude aggressive etiology.Multilevel chronic 
thoracic disc desiccation, with multilevel mild to moderatedegenerative ventral 
endplate osteophytosis.The thoracic cord is normal in signal and morphology.  
The conus is normal andterminates at L1 superior endplate level.T1-2: Shallow 
dorsal disc bulge mildly flattens the ventral thecal sac.  Nocentral canal 
stenosis.  Mild left and moderate right foraminal stenosisbilateral facet 
arthropathy.T2-3: Shallow dorsal disc bulge.  No central canal or foraminal 
stenosis.T9-10: Ligament of flavum thickening and bilateral facet arthropathy p
artiallyefface the dorsal CSF signal although no significant central canal 
stenosis orlateral recess narrowing.  No foraminal stenosis.T10-11: Right 
greater than left facet arthropathy with asymmetric ligament theplane thickening
 effaces the dorsal CSF signal and mildly abuts and slightlyflatten the right 
dorsal cord.  Mild central canal stenosis.  No foraminalnarrowing.T11-12: 
Ligament of flavum thickening and bilateral facet arthropathy effacesthe dorsal 
CSF signal with mild to moderate central canal stenosis.  Mild dorsalcord 
abutment and slight dorsal cord flattening are seen.  Moderate leftforaminal 
stenosis.T12-L1: Mild diffuse disc bulge flattens the ventral thecal sac with 
mildluminal thickening and bilateral facet arthropathy.  Mild central 
canalstenosis.  No foraminal narrowing.No paraspinal soft tissue of 
normality.IMPRESSION: 1.  Mild multilevel thoracic spondylosis with mild T12-L1 
disc bulge and minimaldisc bulges at T1-2 and T2-3.  Mild T12-L1 central canal 
narrowing.  Multilevelligamentum flavum thickening and facet arthropathy 
contribute to additional [id number] canal narrowing and mild to moderate T11-12 
central canal stenosis, withmild dorsal cord flattening and abutment.2.  Mild to
 moderate T1-2 bilateral foraminal stenosis.  Moderate left [id number] 
stenosis.3.  Indeterminate intermediate signal T8 vertebral body intramedullary 
lesion. Further characterization and evaluation with whole body nuclear medicine
 bonescan is advised to exclude aggressive etiology.Read by:        Supa 
FedorsTranscribed by: Supa  FedorsTranscribed Date: 2020 4:58:31 
PMElectronically signed by: Supa MenendezorsDate signed: 2020 4:59:39 PM 









          Name      Value     Range     Interpretation Code Description Data Alla

rce(s) Supporting 

Document(s)

 

                                                                       









                    ID                  Date                Data Source

 

                    [id number]         2020 03:55:00 PM EST [facility]

 

                                        PATIENT MR#:   [id number] NAME:  Yusra Sauer MDATE OF BIRTH: 

1960REFERRING PHYSICIAN: SOS OutsideEXAM DATE:        2020EXAM: MRI 
CERVICAL SPINE INDICATION: C5-6 fusion 2015.  Chronic neck pain, mid back 
pain and lowerback pain.COMPARISON: X-rays 2015, MRI 2015TECHNIQUE: MRI
 scan was performed on a 1.5T GE Scanner using various scan planesand pulse 
sequences.FINDINGS: The visualized vertebral bodies are normal in height.  No 
acutefracture benign hemangioma T2 vertebral body unchanged.  C2-3: Moderate 
facet arthropathy.  No disc herniation or stenosis.C3-4: Mild broad-based 
central disc protrusion.  Moderate thecal saccompression.  Mild indentation left
 ventral spinal cord.  This is mildly worse. Severe right facet arthropathy with
 fusion of the facet joint.  Unchanged.C4-5: Central disc protrusion.  Bilateral
 uncinate osteophytes.  Moderate facetarthropathy.  Moderate indentation of the 
ventral spinal cord.  CSF iscompletely effaced around the cord.  Severe bilater
al foraminal stenosis.  Thesefindings are worse.  The cord compression is 
new.C5-6: Interval fusion.  Central bony ridge with bilateral uncinate 
osteophytes. Moderate thecal sac compression with mild indentation of the 
ventral spinalcord.  There is a thin rim of CSF around the posterior cord.  The 
degree of cordcompression and stenosis has improved compared to the previous 
study.  Severebilateral foraminal stenosis unchanged.  Small focus of increased 
T2 signal inthe left lateral spinal cord getting myelomalacia.  This is 
unchanged.C6-7: Disc bulge with bilateral disc osteophyte complexes.  Mild fac
etarthropathy.  Mild canal stenosis.  No cord compression.  Moderate 
bilateralforaminal stenosis.C7-T1: Moderate facet arthropathy.  2 mm 
anterolisthesis.  Canal and foraminaare patent.  These findings are mildly 
worse.IMPRESSION: 1.  C3-4 broad-based left central disc protrusion with mild 
cord compression. Worse.2.  C4-5 progressive central disc protrusion and facet 
arthropathy withworsening stenosis and cord compression.3.  C5-6 discectomy and 
fusion.  Stenosis and cord compression improved. Unchanged severe bilateral 
foraminal stenosis and subtle myelomalacia.4.  C6-7 unchanged mild canal stenosi
s and moderate foraminal stenosis.Read by:        ROSA GONZALEZTranscribed 
by: ROSA GONZALEZTranscribed Date: 2020 3:55:33 PMElectronically signed
 by: ROSA GONZALEZDate signed: 2020 3:55:52 PM 









          Name      Value     Range     Interpretation Code Description Data Alla

rce(s) Supporting 

Document(s)

 

                                                                       









                    ID                  Date                Data Source

 

                    [id number]            12/10/2020 01:47:28 PM EST [facility], PCName: Kandis DoshisDOB: 1960Provider: Loulou Brown: 2020 Chief ComplaintPatient presents with low back pain  Chief 
Complaint 2Neck and mid back pain, too. [facility] VAS PAIN Established:  MA 
completing section: shall CNA   History of Present IllnessDo you have a Brace 
for your condition? Patient does not have a brace for their condition. Do you 
have a TENS Unit for your condition? Patient does not have a TENS Unit for their
 condition. Supplements: Patient is not currently taking any supplements. Nerve 
Conduction: Patient has had a Nerve Conduction Test. Recent test/procedures: 
Patient was asked and denies having any tests since their last visit. Patient 
was asked and denies being seen by any Physicians since their last visit. The 
patient was last seen by a [facility] provider on 10/27/2020. 
At today's visit patient presents with their Self Implanted Devices The patient 
does not have any implanted devices. The patient does not have a glucose 
monitoring device.  Patient is not currently working. What was patient's 
previous occupation? . The patient is being seen for a post block
 examination.       Pain Duration: Years   Pain Quality: (Nociceptive)  sharp 
Timing:  intermittent. Progression:  improving Palliation:  block, changing 
position, opioid analgesics, rest and resting/laying down Exacerbating:  lifting
 Pain Score:  a current pain level of 5/10, a minimum pain level of 4/10 and a 
maximum pain level of 9/10. Condition type: The patient is being seen for a 
chronic condition. PAIN LOCATION:  the pain is located in the neck,  radiates to
 the left shoulder, left elbow, left forearm, left wrist, left hand and middle 
finger finger, the pain is greater on the left side more than the right, the 
pain is located in the low back ,  (weakness , achy and throbbing pain) radiates
 to the right buttock, left buttock, right hip, left hip, right thigh, right 
knee, right calf/shin, right ankle, right big toe and right pinky toe, the pain 
is greater on the right side more than the left, the pain is in the leg equally 
with the back, the pain is located in the mid-back ,  radiates to the right side
 of the ribs and left side of the ribs and the pain is greater on the left side 
more than the right. The etiology of this injury/condition is unknown. 
RELATIONSHIP TO INJURY: This condition is not related to a specific injury. 
INJURY MECHANISM: The injury resulted from  no known physical event. REVIEW OF 
PAST DIAGNOSTICS: have included:  MRI and electromyography/nerve conduction 
studies . Records were obtained, reviewed and on file. PAST TREATMENT has 
included:  NONSTEROIDAL ANTI-INFLAMMATORY drugs (not effective) Includes Mobic.,
 but ANTICONVULSANTS (effective) Includes Lyrica., OPIOID ANALGESICS (effective)
 Includes. hydrocodone does not work. fentanyl patch seems to be working well., 
cervical surgery 2015 (effective). - Radio Frequency Pertinent Information:
 On a RADIO FREQUENCY ABLATION of the BILATERAL, LUMBAR FACET JOINTS under 
fluoroscopy as confirmed by previous successful medial branch nerve blocks. 
Targeting the.  80 % of pain relief was provided which is ongoing. Allows 
patient to increase activity and functionality Including the following 
activities for longer periods of time with less pain: activities of daily 
living, better sleep, walking, standing, sitting and grocery shop. And to 
decrease the following pain medication(s): 11-, 2020 DR. RASHID. She 
is doing better overall the low back pain is lessened she can be more active. 
INTERVAL EVENTS: include . She states that the RF procedure helped the low back 
pain. She is having more mid back pain and it radiates into her ribs. She is now
 having a lot of mid back and some neck pain...she had neck surgery in 2015. She
 wants to know why the mid back is hurting her and has done no diagnostics on 
the neck for a long time. ASSOCIATED SYMPTOMS: include difficulty sleeping , 
difficulty walking, muscle pain/spasm, radiating, extremity weakness:  left, lo
wer extremity, right. and urinary incontinence, but no fecal incontinence. 
FUNCTIONAL LIMITATIONS: The patient's functional status is limited as follows: 
ability to  perform activities of daily living, participate in aerobic activity,
 participate in hobbies, perform housework and maintain normal sleep pattern. 
MEDICATION SIDE EFFECTS experienced by patient are:  drowsiness.   Review of 
SystemsConstitutional: Normal. Eyes: Normal. ENT: normal. Cardiovascular: 
Normal. Respiratory: Normal. Gastrointestinal: Normal. Genitourinary: Normal. 
Musculoskeletal: lower back pain, midback pain, neck pain, upper back pain, limb
 pain and joint pain. Integumentary: Normal. Neurological: Normal. Psychiatric: 
Normal. Endocrine: Normal. Hematologic/Lymphatic: Normal.   Patient maintains at
 today's visit there has been no change in his/her hematologic history.   I 
reviewed the above with the patient and I feel the ROS to be negative/normal 
other than mid and low back pain, neck, limb and joint pain.   Active Problems 
1. Chronic hip pain, left (719.45,338.29) (M25.552,G89.29) 2. Chronic low back 
pain (724.2,338.29) (M54.5,G89.29) 3. Facet arthropathy, lumbar (721.3) 
(M47.816) 4. History of depression (V11.8) (Z86.59) 5. History of fibromyalgia 
(V13.59) (Z87.39) 6. History of osteoarthritis (V13.4) (Z87.39) 7. Long term 
current use of opiate analgesic (V58.69) (Z79.891) 8. Lumbar radiculopathy 
(724.4) (M54.16) 9. Lumbar spondylosis (721.3) (M47.816) 10. Muscle spasm of 
back (724.8) (M62.830) 11. Osteoarthritis of hip (715.95) (M16.9) 12. Primary 
osteoarthritis of hip (715.15) (M16.10) 13. Spondylolisthesis, lumbar region 
(738.4) (M43.16) Allergies  Morphine Derivatives Itching; Updated By: 
Geno Zapata; 2018 3:14:52 PMonly iv morphineDenied   Adhesive Tape 
Recorded By: Alesha Hollis; 2016 12:56:08 PM  Iodinated Contrast Media 
Recorded By: Alesha Hollis; 2016 12:56:08 PM  Latex Recorded By: Alesha Hollis; 2016 12:56:08 PM Current Meds  Aleve-D Sinus  Cold TB12;Therapy: 
(Recorded:42Ahs9590) to Recordedld 10/9/17 am  Aspirin  MG Oral Tablet 
Delayed Release; take one pill po every day;Therapy: 84Zwi7816 to 
(Evaluate:2018) Recordedself prescribed  Cyclobenzaprine HCl - 10 MG Oral 
Tablet; 1 po TID prn spasms MDD 3;Therapy: 45Xtp7893 to (Evaluate:48Guy7349)  
Requested for: 54Xfd5524; LastRx:2020 Ordered  Lexapro 20 MG Oral 
Tablet;Therapy: (Recorded:02Dub2097) to Recorded  Magnesium Oxide 400 MG Oral 
Capsule;Therapy: 71Eaq6962 to Recorded  Minocycline HCl CAPS;Therapy: 
(Recorded:02Whu0737) to Recorded  Morphine Sulfate ER 15 MG Oral Tablet Extended
 Release; TAKE 1 TABLET EVERY 8HOURS MDD:3;Therapy: 20Dyo0135 to 
(Evaluate:76Ued1229)  Requested for: 2020; LastRx:2020 OrderedLD 
2020 3:00am  oxyCODONE-Acetaminophen  MG Oral Tablet; TAKE 1 TABLET 3
 times daily PRNPAIN MDD:3;Therapy: 54Sjb2560 to (Evaluate:2020)  Requested
 for: 2020; LastRx:2020 OrderedLD 2020 10:00am  Potassium 
Gluconate 595 (99 K) MG Oral Tablet;Therapy: (Recorded:2016) to Recorded  
Protonix 40 MG Oral Tablet Delayed Release;Therapy: 2018 to Recorded  
Wellbutrin TABS;Therapy: (Recorded:2018) to Recorded Past Medical History  
History of Chronic headaches (784.0) (R51)  Denied: History of blood coagulation
 disorder  History of degenerative disc disease (V13.59) (Z87.39)  History of 
depression (V11.8) (Z86.59)  History of fibromyalgia (V13.59) (Z87.39)  Assessed
 By: Arabella Brown (Pain Management); Last Assessed: 2017  History of 
hepatitis A virus infection (V12.09) (Z86.19)  History of kidney stones (V13.01)
 (Z87.442)  History of osteoarthritis (V13.4) (Z87.39)  Assessed By: Arabella Brown (Pain Management); Last Assessed: 2017  History of small bowel 
obstruction (V12.79) (Z87.19)    History of Not currently pregnant (V49.89) 
(Z78.9)  Denied: History of On anticoagulant therapy Surgical History  History 
of Appendectomy  3/2013  History of Back Surgery  2015    C5 disk replacement 
 History of Gastric Surgery For Morbid Obesity  2006  History of Hernia Repair  
2013  History of Hip Replacement Left  History of Hysterectomy  3/2004  
Denied: History of Implantable Cardioverter-Defibrillator  History of Knee 
Arthroplasty  History of Knee Replacement  2005  History of Knee Surgery  
2015  Denied: History of Pacemaker Placement  History of Total Hip Replacement
   Family History  Family history of arthritis (V17.7) (Z82.61)  Family 
history of cardiac disorder (V17.49) (Z82.49)  Family history of cardiac 
disorder (V17.49) (Z82.49)  Family history of diabetes mellitus (V18.0) (Z83.3) 
 Family history of malignant neoplasm (V16.9) (Z80.9) Social History  Current 
non-drinker of alcohol (V49.89) (Z78.9)  Denied: History of Drug use    
Never a smoker  Not currently employed VitalsVital Signs Recorded: 52Dii4575 
03:33PM Height: 5 ft 7 inWeight: 215 lb BMI Calculated: 33.67BSA Calculated: 
2.09Systolic: 128, SittingDiastolic: 79, SittingHeart Rate: 117Respiration: 
16Temperature: 96.8 F, TympanicHeight measured w/wo shoes: w/shoesPain Scale: 5 
Physical ExamGeneral: The patient is a well nourished/well developed, female, 
heavy set, who is in no acute distress and appears stated age. Eyes: Lids are 
atraumatic, no lesions, sclerae are anicteric. Ears, Nose, Mouth, Throat: 
Patient wearing a mask due to COVID-19. Respiratory: Normal chest expansion and 
respiratory effort. Gait and Station: Gait was normal. Cardiovascular: 
Extremities without peripheral edema.Cervical Spine: Surgical incision, well 
healed surgical scar.- Palpation/Tenderness: Tenderness on palpation of the 
left: negative left paraspinal and negative left trapezius muscle. Tenderness on
 palpation of the RIGHT: negative right paraspinal and negative right trapezius 
muscle. - ROM:. Range of motion increases pain.Right Upper Extremity Motor: - 
Wrist: 5/5 flexion, 5/5 extension- Elbow: 5/5 flexion, 5/5 extension- Shoulder: 
5/5 Abduction, 5/5 Adduction- Hand  5/5 Left Upper Extremity Motor:- Wrist: 
5/5 flexion, 5/5 extension- Elbow: 5/5 flexion, 5/5 extension- Shoulder: 5/5 
Abduction, 5/5 Adduction- Hand  5/5 Neurological:Sensation Upper:. 
Cardiovascular: Extremities without peripheral edema.Thoracic Spine: Inspection:
 No deformity, ecchymosis, erythema or swelling noted.Palpation/Tenderness: 
Tenderness on palpation of the LEFT: paraspinal . Tenderness on palpation of the
 RIGHT: negative right paraspinal. ROM:  Range of motion increases pain. 
Flexion: was painful. Extension: was painful. Lumbosacral Spine: - Inspection: 
normal appearance. No deformity, ecchymosis, erythema or swelling noted. Normal 
Lordosis.. - Palpation/Tenderness: Tenderness on palpation of the LEFT: negative
 left paraspinal, negative left sciatic notch and negative left sacroiliac 
joint. - Palpation/Tenderness: Tenderness on palpation of the RIGHT: negative 
right paraspinal, negative right sciatic notch and negative right sacroiliac 
joint. - ROM Flexion: was not restricted, was painless. - ROM Extension: was not
 restricted, was painless.Right Lower Extremity Motor:- Hip: 5/5 flexion, 5/5 
extension- Knee: 5/5 flexion, 5/5 extension- Foot: 5/5 Plantar , 5/5 
DorsiFlexionSpecial Tests: flip test was negative, negative facet challenge and 
negative faberLeft Lower Extremity Motor:- Hip: 5/5 flexion, 5/5 extension- 
Knee: 5/5 flexion, 5/5 extension- Foot: 5/5 Plantar , 5/5 DorsiFlexionSpecial 
Tests: flip test was negative, negative facet challenge and positive 
faberNeurological:. Skin: Warm, dry, acyanotic. Psychological: Alert and 
oriented to person, place and time. Mood and affect are pleasant and 
appropriate. Judgement intact. Insight normal without delusions or 
hallucinations.  Denies suicidal/homicidal ideation.   Results/DataThis patient 
had a urine drug screen on 10-9-2020. Results are not in compliance. Patient 
counselled on not over taking short acting pain meds for exacerbation of pain   
Assessment 1. Back pain, thoracic (724.1) (M54.6) 2. Thoracic radiculopathy 
(724.4) (M54.14) 3. Chronic low back pain (724.2,338.29) (M54.5,G89.29) 4. 
Lumbar radiculopathy (724.4) (M54.16) 5. Neck pain (723.1) (M54.2) Plan 1. 
Changed: From  oxyCODONE-Acetaminophen  MG Oral Tablet take 1po  twice 
daily prn pain MDD:2 To oxyCODONE-Acetaminophen  MG Oral Tablet take 1po 
twice daily prn pain MDD:2LD 2020 10:00am 2. Renew: Morphine Sulfate ER 15 
MG Oral Tablet Extended Release; TAKE 1 TABLET EVERY 8 HOURS MDD:3LD 2020 
3:00am 3. MRI ([facility]) Referral Treatment  Treatment  Status: Hold For - 
Scheduling  Requested for: 15Gay3151Cgdolsr Type 3 : Private (No Auth needed)MRI
 Body Part 3 : C-SpineRequest Type 2 : Private (No Auth needed)Laterality (Left,
 Right, Bilateral) 2 : _NAMRI Body Part 2 : T-SpineDoes Patient have SCS...? : 
NoFront Desk Reminder: : Schedule FMRI within 7 days with Pt's MLP on fileIs 
this to rule out a mass? : NoHas the patient had Lumbar surgery? : NoContrast : 
Without ContrastRequest Type : Private (No Auth needed)MRI Reminder : Ins may 
require patient to have 4-6 wks of recent/documented PT on    body partMRI Body 
Part : L-SpineIs this for a MILD MRI...? : No 4. 30 Day RX Management  Follow-up
  Status: Hold For - Scheduling  Requested for: 2020). Schedule (FUP) 60 
days from today: : 2020). Is an additional appointment needed....? : Yes). 
Schedule 30 day RX from TODAY's date (2 days +/- either way): : 2021 In my 
opinion based on subjective and objective findings from today's visit the 
patient is improving. Medication:.  [place]  Information:  was consulted by my
 designee and I have reviewed the information presented to me and find no 
aberrant compliance issues. Patient has been informed.  Controlled Substance 
Prescribed: MORPHINE ER, PERCOCET . CONTROLLED SUBSTANCE INFORMATION: I advised 
the patient today/previously regarding treatment with the above controlled 
substance and/or narcotic. The patient was then informed of the risks, benefits,
 and alternatives of the narcotic. The risks discussed included but were not 
limited to physical and/or psychological dependence, tolerance of the 
medication, drowsiness, sleepiness, balance/coordination problems, confusion, 
allergic reaction or any other abnormal symptoms. The patient was advised and 
agreed to use the medication only as prescribed, and not to drive, or operate 
heavy equipment or machinery. The patient understood and consented to both 
undergo a narcotic/opiate regimen and agreed to sign and comply with all of the 
[facility] terms of a controlled substance treatment 
and agreement. The patient is on the lowest possible dose of opioids. The 
patient denies adverse side effects and does not demonstrate any aberrant drug 
taking behaviors. The patient is able to perform ADL's  including the following 
activities for longer periods of time with less pain: activities of daily 
living, sleeping, walking, standing, sitting, grocery shopping . There are no 
changes in current medications at this visit. Patient instructed to continue 
current regimen.  The patient is on the lowest possible dose of opioids. The 
patient denies adverse side effects and does not demonstrate any aberrant drug 
taking behaviors. The patient is able to perform ADL's  including the following 
activities for longer periods of time with less pain: activities of daily 
living, sleeping, walking, standing, sitting, grocery shopping . The prescribed 
medications are medically necessary for pain management and rehabilitation. 
Treatment includes: PROCEDURE(S): Blocks/procedures are deferred until further 
diagnostic studies are complete THERAPIES: Therapy Treatment Plan: Deferred: All
 Therapies: Deferred: per patient request. DIAGNOSTIC STUDIES: Based on patients
 symptoms and physical exam findings, I am ordering the following diagnostic 
tests: - MRI Variance/Authorization Request: We are requesting authorization 
(Private) for a MRI - MRI: I am ordering an UPDATED MRI, of the CERVICAL SPINE, 
of the LUMBAR SPINE, of the THORACIC SPINE, the original MRI was done on LUMBAR 
MRI . I will call patient with results. FOLLOW UP: The patient should have a
 follow up 30 Day RX. CONTINUE TREATMENT: Yusra will continue with the 
following:. Yusra is participating in a home exercise program and is encouraged 
to continue. - : The patient was counseled on the following:  treatment 
plan and future treatment options.        Discussion/SummaryYusra is seen for low
 back pain. The low back is much improved after the lumbar facet RF.  She is ha
ving some mid back pain and is worried about past cervical surgery and wonders 
if anything might be deteriorating with this. I have requested updated MRI of 
her entire spine. She is able to reduce her pain meds now that the low back is 
better. She will be seen in a month.   Signatures Electronically signed by : 
Arabella Brown NP; Dec  9 2020  4:31PM EST                       (Author)  
Electronically signed by : Fabricio Camacho MD; Dec 10 2020  1:47PM EST             
          









          Name      Value     Range     Interpretation Code Description Data Alla

rce(s) Supporting 

Document(s)

 

                                                                       









                    ID                  Date                Data Source

 

                    [id number]       2020 12:00:00 PM EST [facility]









          Name      Value     Range     Interpretation Code Description Data Alla

rce(s) Supporting 

Document(s)

 

                                        SARS coronavirus 2 RNA [Presence] in Res

piratory specimen by JOE with probe 

detection                                              [facility]      

 

                                        This lab was ordered by [facility] and reported by 

[facility]. 









                    ID                  Date                Data Source

 

                    [id number]       2020 12:00:00 PM EST [facility]









          Name      Value     Range     Interpretation Code Description Data Alla

rce(s) Supporting 

Document(s)

 

                                        SARS coronavirus 2 RNA [Presence] in Res

piratory specimen by JOE with probe 

detection                                              [facility]      

 

                                        This lab was ordered by [facility] [facility] 

and reported by [facility]. 









                    ID                  Date                Data Source

 

                    [id number]            10/28/2020 08:57:23 AM EDT [facility]

e [facility], PCName: Kandis DoshisDOB: 1960Provider: Loulou Brown: 10/27/2020 Chief ComplaintPatient presents with neck and low back pain
  Chief Complaint 2[place] VAS PAIN Established:  MA completing section: BHAVESHrictye CMA
   History of Present IllnessDo you have a Brace for your condition? Patient 
does not have a brace for their condition. Do you have a TENS Unit for your 
condition? Patient does not have a TENS Unit for their condition. Supplements: 
Patient is not currently taking any supplements. Nerve Conduction: Patient has 
had a Nerve Conduction Test. Recent test/procedures: Patient was asked and 
denies having any tests since their last visit. Patient was asked and denies 
being seen by any Physicians since their last visit. The patient was last seen 
by a [facility] provider on 10/09/2020. At today's visit 
patient presents with their Self Implanted Devices The patient does not have any
 implanted devices. The patient does not have a glucose monitoring device.  
Patient is not currently working. The patient is being seen for a follow-up.    
   Pain Duration: 35 years   Pain Quality: (Nociceptive)  aching, dull and sharp
 Timing:  constant Progression:  worsening Palliation:  block, changing 
position, non-opioid analgesics and opioid analgesics Exacerbating:  sitting, 
standing and walking Pain Score:  a current pain level of 7/10, a minimum pain 
level of 5/10 and a maximum pain level of 10/10. Condition type: The patient is 
being seen for a chronic condition. PAIN LOCATION:  the pain is located in the 
low back ,  (weakness , achy and throbbing pain) radiates to the right buttock, 
left buttock, right hip, left hip, right thigh, right knee, right calf/shin, 
right ankle, right big toe and right pinky toe, the pain is greater on the right
 side more than the left and the pain is in the leg equally with the back. The 
etiology of this injury/condition is unknown. RELATIONSHIP TO INJURY: This 
condition is not related to a specific injury. INJURY MECHANISM: The injury 
resulted from  no known physical event. REVIEW OF PAST DIAGNOSTICS: have 
included:  MRI and electromyography/nerve conduction studies . Records were 
obtained, reviewed and on file. PAST TREATMENT has included:  NONSTEROIDAL ANTI-
INFLAMMATORY drugs (not effective) Includes Mobic., but ANTICONVULSANTS 
(effective) Includes Lyrica., OPIOID ANALGESICS (effective) Includes. 
hydrocodone does not work. fentanyl patch seems to be working well., cervical 
surgery 2015 (effective). - Radio Frequency Pertinent Information: On a 
RADIO FREQUENCY ABLATION of the BILATERAL, LUMBAR FACET JOINTS under fluoroscopy
 as confirmed by previous successful medial branch nerve blocks. Targeting the. 
 80 % of pain relief was provided lasting for 10 month(s). Allows patient to 
increase activity and functionality Including the following activities for 
longer periods of time with less pain: activities of daily living, better sleep,
 walking, standing, sitting and grocery shop. And to decrease the following pain
 medication(s): 19, 19 DR. RASHID. Good reduction of pain in the low 
back and able to walk better. This has complete worn off and she would like to 
get it repeated. INTERVAL EVENTS: include . She is having a lot of pain and does
 admit to needing more pain meds so she did run out of the short acting pain 
meds. She was counseled today about this and will do a block to get on top of 
this. ASSOCIATED SYMPTOMS: include difficulty sleeping , difficulty walking, 
muscle pain/spasm, radiating, extremity weakness:  left, lower extremity, right.
 and urinary incontinence, but no fecal incontinence. FUNCTIONAL LIMITATIONS: 
The patient's functional status is limited as follows: ability to  perform 
activities of daily living, participate in aerobic activity, participate in 
hobbies, perform housework and maintain normal sleep pattern. MEDICATION SIDE 
EFFECTS experienced by patient are:  drowsiness.   Review of 
SystemsConstitutional: Normal. Eyes: Normal. ENT: normal. Cardiovascular: 
Normal. Respiratory: Normal. Gastrointestinal: Normal. Genitourinary: Normal. 
Musculoskeletal: lower back pain, joint pain and limb pain. Integumentary: 
Normal. Neurological: Normal. Psychiatric: Normal. Endocrine: Normal. Hema
tologic/Lymphatic: Normal.   Patient maintains at today's visit there has been 
no change in his/her hematologic history.   I reviewed the above with the 
patient and I feel the ROS to be negative/normal other than low back and joint 
pain.   Active Problems 1. Chronic hip pain, left (399.45,338.29) 
(M25.552,G89.29) 2. Chronic low back pain (724.2,338.29) (M54.5,G89.29) 3. Facet
 arthropathy, lumbar (721.3) (M47.816) 4. History of depression (V11.8) (Z86.59)
 5. History of fibromyalgia (V13.59) (Z87.39) 6. History of osteoarthritis 
(V13.4) (Z87.39) 7. Long term current use of opiate analgesic (V58.69) (Z79.891)
 8. Lumbar radiculopathy (724.4) (M54.16) 9. Lumbar spondylosis (721.3) 
(M47.816) 10. Muscle spasm of back (724.8) (M62.830) 11. Osteoarthritis of hip 
(715.95) (M16.9) 12. Primary osteoarthritis of hip (715.15) (M16.10) 13. 
Spondylolisthesis, lumbar region (738.4) (M43.16) Allergies  Morphine 
Derivatives Itching; Updated By: Geno Zapata; 2018 3:14:52 PMonly iv 
morphineDenied   Adhesive Tape Recorded By: Alesha Hollis; 2016 12:56:08 
PM  Iodinated Contrast Media Recorded By: Alesha Hollis; 2016 12:56:08 PM 
 Latex Recorded By: Alesha Hollis; 2016 12:56:08 PM Current 
Meds<OBX.5.1><OBX.5.1.1>  Aleve-D Sinus </OBX.5.1.1><OBX.5.1.2> Cold 
TB12;</OBX.5.1.2></OBX.5.1>Therapy: (Recorded:2017) to Recordedld 10/9/17 
am  Aspirin  MG Oral Tablet Delayed Release; take one pill po every 
day;Therapy: 2018 to (Evaluate:2018) Recordedself prescribed  
Cyclobenzaprine HCl - 10 MG Oral Tablet; 1 po TID prn spasms MDD 3;Therapy: 
08Ycc4804 to (Evaluate:76Crt0184)  Requested for: 91Acj5147; LastRx:50Xxk7110 
Ordered  Lexapro 20 MG Oral Tablet;Therapy: (Recorded:25Cjb3926) to Recorded  
Magnesium Oxide 400 MG Oral Capsule;Therapy: 69Zkg4984 to Recorded  Minocycline 
HCl CAPS;Therapy: (Recorded:84Eup4656) to Recorded  Morphine Sulfate ER 15 MG 
Oral Tablet Extended Release; TAKE 1 TABLET EVERY 8HOURS MDD:3;Therapy: 
70Kbq3352 to (Evaluate:2020)  Requested for: 2020; LastRx:29Ivi9988 
OrderedLD 10/27/2020  oxyCODONE-Acetaminophen  MG Oral Tablet; TAKE 1 
TABLET 3 times daily PRNPAIN MDD:3;Therapy: 69Tmh8931 to (Evaluate:2020)  
Requested for: 2020; LastRx:25Glz8421 OrderedLD 10/27/2020  Potassium 
Gluconate 595 (99 K) MG Oral Tablet;Therapy: (Recorded:2016) to Recorded  
Protonix 40 MG Oral Tablet Delayed Release;Therapy: 2018 to Recorded  
Wellbutrin TABS;Therapy: (Recorded:2018) to Recorded Past Medical History  
History of Chronic headaches (784.0) (R51)  Denied: History of blood coagulation
 disorder  History of degenerative disc disease (V13.59) (Z87.39)  History of 
depression (V11.8) (Z86.59)  History of fibromyalgia (V13.59) (Z87.39)  Assessed
 By: Arabella Brown (Pain Management); Last Assessed: 2017  History of 
hepatitis A virus infection (V12.09) (Z86.19)  History of kidney stones (V13.01)
 (Z87.442)  History of osteoarthritis (V13.4) (Z87.39)  Assessed By: Arabella Brown (Pain Management); Last Assessed: 2017  History of small bowel 
obstruction (V12.79) (Z87.19)    History of Not currently pregnant (V49.89) 
(Z78.9)  Denied: History of On anticoagulant therapy Surgical History  History 
of Appendectomy  3/2013  History of Back Surgery  2015    C5 disk replacement 
 History of Gastric Surgery For Morbid Obesity  2006  History of Hernia Repair  
2013  History of Hip Replacement Left  History of Hysterectomy  3/2004  
Denied: History of Implantable Cardioverter-Defibrillator  History of Knee 
Arthroplasty  History of Knee Replacement  2005  History of Knee Surgery  
2015  Denied: History of Pacemaker Placement  History of Total Hip Replacement
   Family History  Family history of arthritis (V17.7) (Z82.61)  Family 
history of cardiac disorder (V17.49) (Z82.49)  Family history of cardiac dis
order (V17.49) (Z82.49)  Family history of diabetes mellitus (V18.0) (Z83.3)  
Family history of malignant neoplasm (V16.9) (Z80.9) Social History  Current 
non-drinker of alcohol (V49.89) (Z78.9)  Denied: History of Drug use    
Never a smoker  Not currently employed VitalsVital Signs Recorded: [id number] 
03:18PM Height: 5 ft 6 inWeight: 214 lb BMI Calculated: 34.54BSA Calculated: 
2.06Systolic: 132, SittingDiastolic: 90, SittingHeart Rate: 73Respiration: 
16Temperature: 97.4 FPain Scale: 7 Physical ExamGeneral: female, heavy set, who 
is in mild distress and appears stated age. Eyes: Lids are atraumatic, no 
lesions, sclerae are anicteric. Ears, Nose, Mouth, Throat: Patient wearing a 
mask due to COVID-19. Respiratory: Normal chest expansion and respiratory 
effort. Gait and Station: Gait was normal. Cardiovascular: Extremities without 
peripheral edema.Lumbosacral Spine: - Palpation/Tenderness: Tenderness on 
palpation of the LEFT: paraspinal , but negative left sciatic notch and negative
 left sacroiliac joint. - Palpation/Tenderness: Tenderness on palpation of the 
RIGHT: paraspinal , but negative right sciatic notch and negative right 
sacroiliac joint. - ROM Flexion: was not restricted, was painless. - ROM 
Extension: was restricted, was painful.Right Lower Extremity Motor: - Hip: 5/5 
flexion, 5/5 extension- Knee: 5/5 flexion, 5/5 extension- Foot: 5/5 Plantar , 
5/5 DorsiFlexionLeft Lower Extremity Motor:- Hip: 5/5 flexion, 5/5 extension- 
Knee: 5/5 flexion, 5/5 extension- Foot: 5/5 Plantar , 5/5 
DorsiFlexionNeurological:Sensation Left Lower: normalSensation Right Lower: 
normal. Skin: Warm, dry, acyanotic. Psychological: Alert and oriented to person,
 place and time. Mood and affect are pleasant and appropriate. Judgement intact.
 Insight normal without delusions or hallucinations.  Denies suicidal/homicidal 
ideation.   Results/DataThis patient had a urine drug screen on . Results are 
not in compliance. She was counseled her pain was worse and she did take more of
 the oxycodone.   Assessment 1. Chronic low back pain (724.2,338.29) 
(M54.5,G89.29) 2. Facet arthropathy, lumbar (721.3) (M47.816) 3. Lumbar 
radiculopathy (724.4) (M54.16) 4. Lumbar spondylosis (721.3) (M47.816) Plan 1. 
Renew: Morphine Sulfate ER 15 MG Oral Tablet Extended Release; TAKE 1 TABLET 
EVERY 8 HOURS MDD:3LD 10/27/2020 2. Renew: oxyCODONE-Acetaminophen  MG 
Oral Tablet; TAKE 1 TABLET 3 times daily PRN PAIN MDD:3LD 10/27/2020 3. Block 
([facility]) Referral Procedure  Procedure  Status: Hold For - Scheduling  Requested 
for: [id number] Type : MedicareIs patient currently on a biologic? : NoIs 
patient taking Aspirin 325 mg or greater...? : NoIs your patient on an 
Anticoagulant -OR- have Coagulopathy...? : NoSedation : YesBlock Laterality : RF
 STEPHEN LT side first, then RT side in 1-2 weeks same MDArea : LumbarBlock : Medial
 Branch Radiofrequency (traditional thermal non-pulsed radiofrequency as    
deemed appropriate by the interventional physician)Professional  
needed for block? : NoFront Desk Reminder: : Schedule the Status Post BlockIs 
this an MVP patient (for RF's only)...........?? : No, this is not an MVP 
PatientIs this a RF on a Patient with a Pacemaker/ICD...?? : NoPt weight: SODS: 
</= 450 lbs. HODS: </= 400 lbs.  ENTER WEIGHT: : 214**IF** PT has 
Thrombocytopenia are platelets >/= 100,000 ? : NAAre you ordering pyhsical 
therapy...? : NoDoes patient require a Kenzie lift? : NoIs this an urgent 
request? : No - This is not an urgent requestCovid Risk : Low Risk Medication:. 
 [place]  Information:  was consulted by my designee and I have reviewed the 
information presented to me and find no aberrant compliance issues. Patient has 
been informed.  Controlled Substance Prescribed: MORPHINE ER, PERCOCET . 
CONTROLLED SUBSTANCE INFORMATION: I advised the patient today/previously rega
rding treatment with the above controlled substance and/or narcotic. The patient
 was then informed of the risks, benefits, and alternatives of the narcotic. The
 risks discussed included but were not limited to physical and/or psychological 
dependence, tolerance of the medication, drowsiness, sleepiness, 
balance/coordination problems, confusion, allergic reaction or any other 
abnormal symptoms. The patient was advised and agreed to use the medication only
 as prescribed, and not to drive, or operate heavy equipment or machinery. The 
patient understood and consented to both undergo a narcotic/opiate regimen and 
agreed to sign and comply with all of the [facility] 
terms of a controlled substance treatment and agreement. The patient is on the 
lowest possible dose of opioids. The patient denies adverse side effects and 
does not demonstrate any aberrant drug taking behaviors. The patient is able to 
perform ADL's  including the following activities for longer periods of time wit
h less pain: activities of daily living, sleeping, walking, standing, sitting, 
grocery shopping . There are no changes in current medications at this visit. 
Patient instructed to continue current regimen.  The patient is on the lowest 
possible dose of opioids. The patient denies adverse side effects and does not 
demonstrate any aberrant drug taking behaviors. The patient is able to perform 
ADL's  including the following activities for longer periods of time with less 
pain: activities of daily living, sleeping, walking, standing, sitting, grocery 
shopping . The prescribed medications are medically necessary for pain 
management and rehabilitation. Treatment includes: PROCEDURE(S):  ASIPP Risk 
Stratification of Patients presenting for Interventional Pain Procedures: 
Decreasing Morbidity of COVID-19 Comments: 60=1 PTComments: 0Comments: 
0Comments: 34.54=2Comments: 0Comments: 0Comments: HEP A 2 PTComments: 0Total 
Points: 5/15 According to the Covid-19 ASIPP guidelines and the medical history 
as relayed to me by the patient, the Covid-19 risk stratification is low .- 
Nerve Block Plan: I am ordering a, BILATERAL, LUMBAR, FACET JOINT MEDIAL BRANCH 
NERVE BLOCK. Definitive levels to be determined by the interventional physician 
based on fluoroscopic imaging and symptomatology at the time of the procedure. 
This procedure is , targeting the L3 L4 level, L4 L5 level, L5 S1 level and 
under fluoroscopy with SEDATION. NERVE BLOCK: The material risks, benefits, 
alternatives have been discussed with the patient, including no treatment. They 
include, but are not limited to, bleeding, bruising, infection, damage to 
targeted and non-targeted tissue, increased pain, nerve injury or other 
reaction, if severe, could lead to CVA, arrhythmias or death. The patient was 
given procedure instructions and educational material for this specific 
procedure at the time of the visit.- Bleeding Disorder: Patient denies having a 
bleeding disorder. - Anticoagulation therapy:. Patient denies current treatment 
with anti-coagulation therapy. RADIO FREQUENCY: Radio Frequency Pacer 
Information: Patient denies having a pacemaker / defibrillator.  NERVE BLOCK: 
The material risks, benefits, alternatives have been discussed with the patient,
 including no treatment. They include, but are not limited to, bleeding, 
bruising, infection, damage to targeted and non-targeted tissue, increased pain,
 nerve injury or other reaction, if severe, could lead to CVA, arrhythmias or 
death. The patient was given educational materials for this specific procedure 
at the time of the visit. The patient received a copy of our after care 
instructions.Radio Frequency Variance/Authorization Request We are requesting 
authorization (Private) for Radio Frequency procedure- Repeat Radio Frequency 
Detail I am ordering a, traditional thermal non-pulsed repeat RADIO FREQUENCY 
ABLATION of the LEFT and RIGHT, LUMBAR FACET JOINTS under fluoroscopy as done by
 previous radio frequency ablation at least 6 months ago as deemed appropriate 
by the interventional physician. The plan is to schedule the patient for the 
left side first, followed by the right side in 1 to 2 weeks. Targeting the L3 L4
 level, L4 L5 level and L5 S1 level. This procedure will be done under 
SEDATION.- Bleeding Disorder: Patient denies having a bleeding disorder. Patient
 Denies Current treatment with anti-coagulation therapy. Nerve Block: The 
material risks, benefits, alternatives have been discussed with the patient, 
including no treatment. They include, but are not limited to, bleeding, 
bruising, infection, damage to targeted and non-targeted tissue, increased pain,
 nerve injury or other reaction, if severe, could lead to CVA, arrhythmias or 
death. The patient was given educational materials at the time of the visit. The
 patient received a copy of our after care instructions.- Medical Necessity: RF 
Medical Necessity Criteria: Repeat Facet RF: The patient's pain is axial in 
nature (NOT radicular). There is no evidence of nerve root compression in past 
medical or diagnostic history in relation to this pain. It has been at least 6 
months and the original pain has returned. Patient has experienced >/= 50% of 
improvement of pain and improvement in specific ADL's documented for at least 6 
months.  The patient shows marked functional improvement in the following areas:
 ambulation , activities of daily living, sleep pattern, cognitive improvements,
 decrease in medication used for PRN pain relief and quality life improvements. 
Please refer to [facility] [place] Physical Modality Improvement
 Questionnaire for details. THERAPIES: Therapy Treatment Plan: Deferred: All 
Therapies: Deferred: per patient request. FOLLOW UP: The patient should have a 
follow up visit 4 weeks post block. CONTINUE TREATMENT: Yusra will continue with 
the following:. Yusra is participating in a home exercise program and is 
encouraged to continue.        Discussion/SummaryYusra is seen for worsening low 
back pain. She is over due to get the RF procedure this was due some time ago. 
She has axial pain and pain with extension. She was counseled regarding her meds
 and will be seen back after the block. The meds are helpful. She has low covid 
risk and wishes to proceed.   Signatures Electronically signed by : Arabella Brown NP; Oct 27 2020  3:57PM EST                       (Author)  Electronically 
signed by : Sourav Shepherd MD; Oct 28 2020  8:57AM EST                       









          Name      Value     Range     Interpretation Code Description Data Alla

rce(s) Supporting 

Document(s)

 

                                                                       









                    ID                  Date                Data Source

 

                    [id number]            10/09/2020 04:06:53 PM EDT [facility]

[facility], PCName: Kandis kay ChildsDOB: 1960Provider: 

Gabriela Navarro: 10/09/2020 Chief Complaint 2 MA completing section: 
ASmithLPN   History of Present IllnessA Urine Drug Screen was ordered for Yusra Dietz and collected on site today 10/09/2020. Creatinine has been ordered as 
well for specimen validity, not for kidney function. Preliminary UDS results are
 not final and should not be used to determine patient care or plan of 
treatment. Initially a qualitative immunoassay screen will be done. Any positive
 findings or negative findings that are out of compliance will be further tested
 with a more comprehensive quantitative confirmation LCMS study. It is part of 
the normal prescribing protocol of controlled substances and is considered 
standard of care. The patient is being seen today for refill of prescriptions 
for controlled substances.      The date of onset of symptoms is approximately 
years.   Current Meds 1. Aleve-D Sinus  Cold TB12; Therapy: (Recorded:2017)
 to Recorded 2. Aspirin  MG Oral Tablet Delayed Release; take one pill po 
every day; Therapy: 06Oyl5747 to (Evaluate:2018) Recorded 3. 
Cyclobenzaprine HCl - 10 MG Oral Tablet; 1 po TID prn spasms MDD 3; Therapy: 
33Lac0808 to (Evaluate:72Qlg6685)  Requested for: 35Hsg3848; Last Rx:39Esy8430 
Ordered 4. Lexapro 20 MG Oral Tablet; Therapy: (Recorded:12Kdn9466) to Recorded 
5. Magnesium Oxide 400 MG Oral Capsule; Therapy: 53Jae4028 to Recorded 6. 
Minocycline HCl CAPS; Therapy: (Recorded:98Wxy9243) to Recorded 7. Morphine 
Sulfate ER 15 MG Oral Tablet Extended Release; TAKE 1 TABLET EVERY 8 HOURS 
MDD:3; Therapy: 93Hgf8525 to (Evaluate:06Rax8139)  Requested for: 39Giy4760; 
Last Rx:64Cch6671 Ordered 8. oxyCODONE-Acetaminophen  MG Oral Tablet; TAKE
 1 TABLET 3 times daily PRN PAIN MDD:3; Therapy: 49Wqn6521 to 
(Evaluate:21Cih6787)  Requested for: 28Tgd5251; Last Rx:36Iin9652 Ordered 9. 
Potassium Gluconate 595 (99 K) MG Oral Tablet; Therapy: (Recorded:2016) to 
Recorded 10. Protonix 40 MG Oral Tablet Delayed Release;  Therapy: 2018 to 
Recorded 11. Wellbutrin TABS;  Therapy: (Recorded:2018) to Recorded 
Allergies  Morphine Derivatives Itching; Updated By: Geno Zapata; 
2018 3:14:52 PMonly iv morphineDenied   Adhesive Tape Recorded By: 
Alesha Hollis; 2016 12:56:08 PM  Iodinated Contrast Media Recorded By: 
Alesha Hollis; 2016 12:56:08 PM  Latex Recorded By: Alesha Hollis; 
2016 12:56:08 PM NotesNYSW 30 Day RX Note: Chief complaint: Patient is here 
today for 30 day refill of their controlled substance prescription Patient is 
being treated with chronic opioid therapy for CHRONIC LOW BACK PAIN.  Patient 
denies side effects related to chronic opioid use and has been re-educated 
regarding the controlled substance agreement. In my opinion patient continues to
 receive primary benefit with chronic opioid therapy. MORPHINE SULFATE ER, 
OXYCODONE . CONTROLLED SUBSTANCE INFORMATION: I advised the patient 
today/previously regarding treatment with the above controlled substance and/or 
narcotic. The patient was then informed of the risks, benefits, and alternatives
 of the narcotic. The risks discussed included but were not limited to physical 
and/or psychological dependence, tolerance of the medication, drowsiness, 
sleepiness, balance/coordination problems, confusion, allergic reaction or any 
other abnormal symptoms. The patient was advised and agreed to use the 
medication only as prescribed, and not to drive, or operate heavy equipment or 
machinery. The patient understood and consented to both undergo a 
narcotic/opiate regimen and agrees to comply with all of the [facility] terms of a controlled substance treatment and agreement.  Patient has 
received a copy of the Opioid instruction sheet.  I am giving the patient a 30 
day refill without changes. The patient consents to move forward with treatment.
 ...OPIOID ABUSE is a national epidemic that Physicians and other prescribers 
have the power to help prevent. In our opioid monitoring surveillance to help 
minimize misuse and diversion, moderate to high risk patients are required to 
have face to face 30 day refill of opioids. This will help minimize the pot
ential for electronic false requests, deterring potential fraudulent behavior. 
[facility]  Information:  was consulted by my designee and I have reviewed the 
information presented to me and find no aberrant compliance issues. Patient has 
been informed.    Physical ExamGeneral: The patient is a well nourished/well 
developed, female, who is obese, who is in no acute distress and appears stated 
age. Eyes: Lids are atraumatic, no lesions, sclerae are anicteric. Ears, Nose, 
Mouth, Throat: Patient wearing a mask due to COVID-19. Respiratory: Normal chest
 expansion and respiratory effort. Gait and Station: Gait was normal. Skin: 
Warm, dry, acyanotic. Psychological: Alert and oriented to person, place and 
time. Mood and affect are pleasant and appropriate. Judgement intact. Insight 
normal without delusions or hallucinations.  Denies suicidal/homicidal ideation.
   [facility] Prior UDS Results Detail Form: This patient had a urine 
drug screen on 2020. Results are in compliance.   Assessment 1. Chronic low
 back pain (724.2,338.29) (M54.5,G89.29) 2. Facet arthropathy, lumbar (721.3) 
(M47.816) 3. Long term current use of opiate analgesic (V58.69) (Z79.891) Plan 
1. Renew: Morphine Sulfate ER 15 MG Oral Tablet Extended Release; TAKE 1 TABLET 
EVERY 8 HOURS MDD:3LD10/2020 2. Renew: oxyCODONE-Acetaminophen  MG Oral 
Tablet; TAKE 1 TABLET 3 times daily PRN PAIN MDD:3LD 10/9/2020 3. UDS-LAB 
Medicare / Commercial ([facility] Urine Drug SCreen); [Do Not Release]; Specimen 
Source:Urine; Status:In Progress - Specimen/Data Collected;   Done: 2020 
[facility] Treatment Plan (2): UDS: This is an established patient and is on ongoing 
opioid therapy. A UDS is being obtained today, the patient has previously 
consented to UDS as part of the Controlled Substance and Treatment Agreement. 
The reason for today's UDS is: patient was selected at random and scored as 
moderate risk on the opioid risk assessment. Creatinine has been ordered as well
for specimen validity, not for kidney function. Preliminary UDS results are not 
final and should not be used to determine patient care or plan of treatment. 
Initially a qualitative immunoassay screen will be done. Any positive findings 
or negative findings that are out of compliance will be further tested with a 
more comprehensive quantitative confirmation LCMS study. It is part of the 
normal prescribing protocol of controlled substances and is considered standard 
of care.  Treatment includes: FOLLOW UP: The patient should have a follow up 
visit in 1 month.        Signatures Electronically signed by : FE Lr; Oct  9 2020  3:44PM EST                       (Author)  Electronically 
signed by : Laura Castro MD; Oct  9 2020  4:06PM EST                       









          Name      Value     Range     Interpretation Code Description Data Alla

rce(s) Supporting 

Document(s)

 

                                                                       









                    ID                  Date                Data Source

 

                    [id number]            09/10/2020 08:23:42 AM EDT [facility]

[facility], PCName: Kandis DoshisDOB: 1960Provider: Kevin 

MosheMAHOGANY: 2020 Chief ComplaintPatient presents with back pain  Chief 
Complaint 2[place] VAS PAIN Established:  MA completing section: shall CNA   
History of Present IllnessDo you have a Brace for your condition? Patient does 
not have a brace for their condition. Do you have a TENS Unit for your 
condition? Patient does not have a TENS Unit for their condition. Supplements: 
Patient is not currently taking any supplements. Nerve Conduction: Patient has 
had a Nerve Conduction Test. Recent test/procedures: Patient was asked and 
denies having any tests since their last visit. Patient was asked and denies 
being seen by any Physicians since their last visit. The patient was last seen 
by a [facility] provider on 2020. At today's visit patient
presents with their Self Patient is not currently working. What was patient's 
previous occupation? . The patient is being seen for a follow-up.
      Pain Duration: Years   Pain Quality: (Neuropathic)  burning, numbness and 
tingling Pain Quality: (Nociceptive)  aching, dull and sharp Timing:  constant 
Progression:  unchanged Palliation:  opioid analgesics, rest and resting/laying 
down Exacerbating:  standing and walking Pain Score:  a current pain level of 
6/10 and a minimum pain level of 4/10. Condition type: The patient is being seen
for a chronic condition. PAIN LOCATION:  the pain is located in the low back ,  
(weakness , achy and throbbing pain) radiates to the right buttock, left 
buttock, right hip, left hip, right thigh, right knee, right calf/shin, right 
ankle, right big toe and right pinky toe, the pain is greater on the right side 
more than the left and the pain is in the leg equally with the back. The 
etiology of this injury/condition is unknown. RELATIONSHIP TO INJURY: This 
condition is not related to a specific injury. INJURY MECHANISM: The injury 
resulted from  no known physical event. REVIEW OF PAST DIAGNOSTICS: have includ
ed:  MRI and electromyography/nerve conduction studies . Records were obtained, 
reviewed and on file. PAST TREATMENT has included:  NONSTEROIDAL ANTI-
INFLAMMATORY drugs (not effective) Includes Mobic., but ANTICONVULSANTS 
(effective) Includes Lyrica., OPIOID ANALGESICS (effective) Includes. 
hydrocodone does not work. fentanyl patch seems to be working well., cervical 
surgery 2015 (effective). - Radio Frequency Pertinent Information: On a 
RADIO FREQUENCY ABLATION of the BILATERAL, LUMBAR FACET JOINTS under fluoroscopy
as confirmed by previous successful medial branch nerve blocks. Targeting the.  
80 % of pain relief was provided which is ongoing. Allows patient to increase 
activity and functionality Including the following activities for longer periods
of time with less pain: activities of daily living, better sleep, walking, 
standing, sitting and grocery shop. And to decrease the following pain 
medication(s): 19, 19 DR. RASHID. Good reduction of pain in the low back
and able to walk better. INTERVAL EVENTS: include . She is still dealing with 
the low back pain...she is getting ready to have a block and it would be the RF 
procedure but she is just not yet ready.. She is still taking care of the grand 
kids and now with school and covid. She has had some episodes of bad muscle 
spasm. ASSOCIATED SYMPTOMS: include difficulty sleeping , difficulty walking, 
muscle pain/spasm, radiating, extremity weakness:  left, lower extremity, right.
and urinary incontinence, but no fecal incontinence. FUNCTIONAL LIMITATIONS: The
patient's functional status is limited as follows: ability to  perform 
activities of daily living, participate in aerobic activity, participate in 
hobbies, perform housework and maintain normal sleep pattern. MEDICATION SIDE 
EFFECTS experienced by patient are:  drowsiness.   Review of 
SystemsConstitutional: Normal. Eyes: Normal. ENT: normal. Cardiovascular: 
Normal. Respiratory: Normal. Gastrointestinal: Normal. Genitourinary: Normal. 
Musculoskeletal: lower back pain and joint pain. Integumentary: Normal. 
Neurological: Normal. Psychiatric: Normal. Endocrine: Normal. 
Hematologic/Lymphatic: Normal.   Patient maintains at today's visit there has 
been no change in his/her hematologic history.   I reviewed the above with the 
patient and I feel the ROS to be negative/normal other than low back and joint 
and at times upper thigh pain.   Active Problems 1. Chronic hip pain, left 
(719.45,338.29) (M25.552,G89.29) 2. Chronic low back pain (724.2,338.29) 
(M54.5,G89.29) 3. Facet arthropathy, lumbar (721.3) (M47.816) 4. History of 
depression (V11.8) (Z86.59) 5. History of fibromyalgia (V13.59) (Z87.39) 6. 
History of osteoarthritis (V13.4) (Z87.39) 7. Long term current use of opiate 
analgesic (V58.69) (Z79.891) 8. Lumbar radiculopathy (724.4) (M54.16) 9. Lumbar 
spondylosis (721.3) (M47.816) 10. Osteoarthritis of hip (715.95) (M16.9) 11. 
Primary osteoarthritis of hip (715.15) (M16.10) 12. Spondylolisthesis, lumbar 
region (738.4) (M43.16) Allergies  Morphine Derivatives Itching; Updated By: 
Geno Zapata; 2018 3:14:52 PMonly iv morphineDenied   Adhesive Tape 
Recorded By: Alesha Hollis; 2016 12:56:08 PM  Iodinated Contrast Media 
Recorded By: Alesha Hollis; 2016 12:56:08 PM  Latex Recorded By: Alesha Hollis; 2016 12:56:08 PM Current Meds  Aleve-D Sinus  Cold TB12;Therapy: 
(Recorded:2017) to Recordedld 10/9/17 am  Aspirin  MG Oral Tablet 
Delayed Release; take one pill po every day;Therapy: 64Gia6092 to 
(Evaluate:2018) Recordedself prescribed  Lexapro 20 MG Oral Tablet;Therapy:
(Recorded:01Epo7510) to Recorded  Magnesium Oxide 400 MG Oral Capsule;Therapy: 
05Qrl4197 to Recorded  Minocycline HCl CAPS;Therapy: (Recorded:03Uki3656) to 
Recorded  Morphine Sulfate ER 15 MG Oral Tablet Extended Release; TAKE 1 TABLET 
EVERY 8HOURS MDD:3;Therapy: 69Nwn0493 to (Evaluate:17Hsf9912)  Requested for: 
2020; LastRx:12Fvs9077 OrderedLD 2020 10:00am  oxyCODONE-Acetaminophen 
 MG Oral Tablet; TAKE 1 TABLET 3 times daily PRNPAIN MDD:3;Therapy: 16Aug2
019 to (Evaluate:08Yxv2978)  Requested for: 2020; LastRx:[id number] 
OrderedLD 2020 10:30am  Potassium Gluconate 595 (99 K) MG Oral 
Tablet;Therapy: (Recorded:2016) to Recorded  Protonix 40 MG Oral Tablet 
Delayed Release;Therapy: 2018 to Recorded  Wellbutrin TABS;Therapy: 
(Recorded:2018) to Recorded Past Medical History  History of Chronic 
headaches (784.0) (R51)  Denied: History of blood coagulation disorder  History 
of degenerative disc disease (V13.59) (Z87.39)  History of depression (V11.8) 
(Z86.59)  History of fibromyalgia (V13.59) (Z87.39)  Assessed By: Arabella Brown 
(Pain Management); Last Assessed: 2017  History of hepatitis A virus 
infection (V12.09) (Z86.19)  History of kidney stones (V13.01) (Z87.442)  
History of osteoarthritis (V13.4) (Z87.39)  Assessed By: Arabella Brown (Pain 
Management); Last Assessed: 2017  History of small bowel obstruction 
(V12.79) (Z87.19)    History of Not currently pregnant (V49.89) (Z78.9)  
Denied: History of On anticoagulant therapy Surgical History  History of 
Appendectomy  3/2013  History of Back Surgery  2015    C5 disk replacement  
History of Gastric Surgery For Morbid Obesity    History of Hernia Repair  1
2013  History of Hip Replacement Left  History of Hysterectomy  3/2004  
Denied: History of Implantable Cardioverter-Defibrillator  History of Knee 
Arthroplasty  History of Knee Replacement  2005  History of Knee Surgery  
2015  Denied: History of Pacemaker Placement  History of Total Hip Replacement
  Family History  Family history of arthritis (V17.7) (Z82.61)  Family 
history of cardiac disorder (V17.49) (Z82.49)  Family history of cardiac 
disorder (V17.49) (Z82.49)  Family history of diabetes mellitus (V18.0) (Z83.3) 
Family history of malignant neoplasm (V16.9) (Z80.9) Social History  Current 
non-drinker of alcohol (V49.89) (Z78.9)  Denied: History of Drug use    
Never a smoker  Not currently employed VitalsVital Signs Recorded: 2020 
03:35PM Height: 5 ft 7 inWeight: 216 lb BMI Calculated: 33.83BSA Calculated: 
2.09Systolic: 126, SittingDiastolic: 78, SittingHeart Rate: 84Respiration: 
16Temperature: 97.6 F, TympanicHeight measured w/wo shoes: w/shoesPain Scale: 6 
Physical ExamGeneral: The patient is a well nourished/well developed, female, 
heavy set, who is in mild distress and appears stated age. Eyes: Lids are 
atraumatic, no lesions, sclerae are anicteric. Ears, Nose, Mouth, Throat: 
external ears and nose without trauma. Respiratory: Normal chest expansion and 
respiratory effort. Lumbosacral Spine: - Inspection: normal appearance. No 
deformity, ecchymosis, erythema or swelling noted. Normal Lordosis.. - 
Palpation/Tenderness: Tenderness on palpation of the LEFT: paraspinal  and 
sacroiliac joint, but negative left sciatic notch. - Palpation/Tenderness: 
Tenderness on palpation of the RIGHT: paraspinal  and sacroiliac joint, but 
negative right sciatic notch. - ROM Flexion: was not restricted, was painless. -
ROM Extension: was not restricted, was painful.Right Lower Extremity Motor:- 
Hip: 5/5 flexion, 5/5 extension- Knee: 5/5 flexion, 5/5 extension- Foot: 5/5 
Plantar , 5/5 DorsiFlexionSpecial Tests: flip test was negative, positive facet 
challenge  and positive faberLeft Lower Extremity Motor:- Hip: 5/5 flexion, 5/5 
extension- Knee: 5/5 flexion, 5/5 extension- Foot: 5/5 Plantar , 5/5 
DorsiFlexionSpecial Tests: flip test was negative, positive facet challenge  and
positive faberNeurological:Sensation Left Lower: normalSensation Right Lower: 
normal. Skin: Warm, dry, acyanotic. Psychological: Alert and oriented to person,
place and time. Mood and affect are pleasant and appropriate. Judgement intact. 
Insight normal without delusions or hallucinations.  Denies suicidal/homicidal 
ideation.   Results/DataThis patient had a urine drug screen on 2020. 
Results are in compliance.   Assessment 1. Muscle spasm of back (724.8) 
(M62.830) 2. Lumbar radiculopathy (724.4) (M54.16) 3. Lumbar spondylosis (721.3)
(M47.816) Plan 1. Start: Cyclobenzaprine HCl - 10 MG Oral Tablet; 1 po TID prn 
spasms MDD 3 2. Renew: Morphine Sulfate ER 15 MG Oral Tablet Extended Release; 
TAKE 1 TABLET EVERY 8 HOURS MDD:2020 10:00am 3. Renew: oxyCODONE-
Acetaminophen  MG Oral Tablet; TAKE 1 TABLET 3 times daily PRN PAIN 
MDD:2020 10:30am 4. Follow-up in 2 months Follow Up  Follow-up  Status: 
Hold For - Scheduling  Requested for:  of the day. glazaRamirezedesequiel Ap
pointment for 15 or 30 minutes : Schedule 15 minute appointment In my opinion 
based on subjective and objective findings from today's visit the patient is 
minimally changed. Medication:.  [place]  Information:  was consulted by my 
designee and I have reviewed the information presented to me and find no 
aberrant compliance issues. Patient has been informed.  General Medications 
Prescribed: CYCLOBENZEPRINE . GENERAL MEDICATIONS: I advised the patient 
today/previously regarding treatment with the above medication(s). The risks, 
benefits, common side effects and alternative treatments were discussed with the
patient. The provider verbalized with the patient. The patient verbalized 
understanding and was told to call if there were any untoward effects. 
Controlled Substance Prescribed: MORPHINE ER, OXYCODONE . CONTROLLED SUBSTANCE 
INFORMATION: I advised the patient today/previously regarding treatment with the
above controlled substance and/or narcotic. The patient was then informed of the
risks, benefits, and alternatives of the narcotic. The risks discussed included 
but were not limited to physical and/or psychological dependence, tolerance of 
the medication, drowsiness, sleepiness, balance/coordination problems, 
confusion, allergic reaction or any other abnormal symptoms. The patient was 
advised and agreed to use the medication only as prescribed, and not to drive, 
or operate heavy equipment or machinery. The patient understood and consented to
both undergo a narcotic/opiate regimen and agreed to sign and comply with all of
the [facility] terms of a controlled substance 
treatment and agreement. The patient is on the lowest possible dose of opioids. 
The patient denies adverse side effects and does not demonstrate any aberrant 
drug taking behaviors. The patient is able to perform ADL's  including the 
following activities for longer periods of time with less pain: activities of 
daily living, sleeping, walking, standing, sitting, grocery shopping . There are
no changes in current medications at this visit. Patient instructed to continue 
current regimen.  The patient is on the lowest possible dose of opioids. The 
patient denies adverse side effects and does not demonstrate any aberrant drug 
taking behaviors. The patient is able to perform ADL's  including the following 
activities for longer periods of time with less pain: activities of daily 
living, sleeping, walking, standing, sitting, grocery shopping . The prescribed 
medications are medically necessary for pain management and rehabilitation. 
Treatment includes: PROCEDURE(S): The patient defers blocks/procedures at this 
time RADIO FREQUENCY: (She is due to get this but defers at this time) 
THERAPIES: Therapy Treatment Plan: Deferred: All Therapies: Deferred: per 
patient request. FOLLOW UP: The patient should have a follow up 30 Day RX. 
CONTINUE TREATMENT: Yusra will continue with the following:. Yusra is 
participating in a home exercise program and is encouraged to continue. - 
: The patient was counseled on the following:  treatment plan and future 
treatment options.        Discussion/SummaryYusra is seen for her low back and 
thigh pain. She is over due to get the RF procedure but has been putting this 
off due to commitments related to caring for her grand kids. At this time she 
wants to hold off on any procedures and will be seen in a month.   Signatures 
Electronically signed by : Arabella Brown NP; Sep  8 2020  4:31PM EST             
         (Author)  Electronically signed by : Sonja Jones MD; Sep 10 2020  
8:23AM EST                       









          Name      Value     Range     Interpretation Code Description Data Alla

rce(s) Supporting 

Document(s)

 

                                                                       









                    ID                  Date                Data Source

 

                    [id number]            2020 08:43:28 AM EDT [facility]

[facility], PCName: Kandis DoshisDOB: 1960Provider: Jemma Ramirez: 2020 









          Name      Value     Range     Interpretation Code Description Data Alla

rce(s) Supporting 

Document(s)

 

                                                                       









                    ID                  Date                Data Source

 

                    [id number]            2020 03:07:09 PM EDT [facility]

[facility] PCName: Kandis DoshisDOB: 1960Provider: Jemma Ramirez: 2020 Chief Complaint 2MA completing section: shall CNA   History
of Present IllnessThe patient is being seen today for refill of prescriptions 
for controlled substances.      The date of onset of symptoms is approximately 
Years.   Current Meds 1. Aleve-D Sinus  Cold TB12; Therapy: (Recorded:2017)
to Recorded 2. Aspirin  MG Oral Tablet Delayed Release; take one pill po 
every day; Therapy: 30Ufc1067 to (Evaluate:2018) Recorded 3. Lexapro 20 MG 
Oral Tablet; Therapy: (Recorded:77Jrt3468) to Recorded 4. Magnesium Oxide 400 MG
Oral Capsule; Therapy: 77Gyj9005 to Recorded 5. Minocycline HCl CAPS; Therapy: 
(Recorded:24Jrh8011) to Recorded 6. Morphine Sulfate ER 15 MG Oral Tablet 
Extended Release; TAKE 1 TABLET EVERY 8 HOURS MDD:3; Therapy: 89Xdo9622 to 
(Evaluate:09Bcw2244)  Requested for: 44Wwe9314; Last Rx:22Ptn0089 Ordered 7. 
oxyCODONE-Acetaminophen  MG Oral Tablet; TAKE 1 TABLET 3 times daily PRN 
PAIN MDD:3; Therapy: 55Qrj6674 to (Evaluate:25Muk1463)  Requested for: 
98Kgi2103; Last Rx:75Tmo0130 Ordered 8. Potassium Gluconate 595 (99 K) MG Oral T
ablet; Therapy: (Recorded:2016) to Recorded 9. Protonix 40 MG Oral Tablet 
Delayed Release; Therapy: 2018 to Recorded 10. Wellbutrin TABS;  Therapy: 
(Recorded:2018) to Recorded Allergies  Morphine Derivatives Itching; 
Updated By: Geno Zapata; 2018 3:14:52 PMonly iv morphineDenied   
Adhesive Tape Recorded By: Alesha Hollis; 2016 12:56:08 PM  Iodinated 
Contrast Media Recorded By: Alesha Hollis; 2016 12:56:08 PM  Latex 
Recorded By: Alesha Hollis; 2016 12:56:08 PM NotesNYSW 30 Day RX Note: 
Chief complaint: Patient is here today for 30 day refill of their controlled 
substance prescription Patient is being treated with chronic opioid therapy for 
back pain.  Patient denies side effects related to chronic opioid use and has 
been re-educated regarding the controlled substance agreement. In my opinion 
patient continues to receive primary benefit with chronic opioid therapy. 
MORPHINE AND OXYCODONE . CONTROLLED SUBSTANCE INFORMATION: I advised the patient
today/previously regarding treatment with the above controlled substance and/or 
narcotic. The patient was then informed of the risks, benefits, and alternatives
of the narcotic. The risks discussed included but were not limited to physical 
and/or psychological dependence, tolerance of the medication, drowsiness, 
sleepiness, balance/coordination problems, confusion, allergic reaction or any 
other abnormal symptoms. The patient was advised and agreed to use the 
medication only as prescribed, and not to drive, or operate heavy equipment or 
machinery. The patient understood and consented to both undergo a narcotic
/opiate regimen and agrees to comply with all of the [facility]
terms of a controlled substance treatment and agreement.  I am giving the 
patient a 30 day refill without changes. The patient consents to move forward 
with treatment. ...OPIOID ABUSE is a national epidemic that Physicians and other
prescribers have the power to help prevent. In our opioid monitoring 
surveillance to help minimize misuse and diversion, moderate to high risk 
patients are required to have face to face 30 day refill of opioids. This will 
help minimize the potential for electronic false requests, deterring potential 
fraudulent behavior. [facility]  Information:  was consulted by my designee and I
have reviewed the information presented to me and find no aberrant compliance 
issues. Patient has been informed.    Physical ExamGeneral: The patient is a 
well nourished/well developed, female, heavy set, who is in no acute distress 
and appears stated age. Eyes: Lids are atraumatic, no lesions, sclerae are anic
teric. Ears, Nose, Mouth, Throat: external ears and nose without trauma. 
Respiratory: Normal chest expansion and respiratory effort. Gait and Station: 
Gait was normal. Skin: Warm, dry, acyanotic. Psychological: Alert and oriented 
to person, place and time. Mood and affect are pleasant and appropriate. 
Judgement intact. Insight normal without delusions or hallucinations.  Denies 
suicidal/homicidal ideation.   Assessment 1. Chronic low back pain 
(724.2,338.29) (M54.5,G89.29) 2. Lumbar radiculopathy (724.4) (M54.16) 3. Lumbar
spondylosis (721.3) (M47.816) Plan 1. Renew: Morphine Sulfate ER 15 MG Oral Tab
let Extended Release; TAKE 1 TABLET EVERY 8 HOURS MDD:3LD 8/10/2020 6:30pm 2. 
Renew: oxyCODONE-Acetaminophen  MG Oral Tablet; TAKE 1 TABLET 3 times 
daily PRN PAIN MDD:32020 11:30pm 3. 30 Day RX Management  Follow-up  
Status: Hold For - Scheduling  Requested for: 2020). Is an additional 
appointment needed....? : No). Schedule 30 day RX from TODAY's date (2 days +/- 
either way): : 2020 [facility] Treatment Plan (2): UDS: This is an established 
patient and is on ongoing opioid therapy. A UDS is being obtained today, the 
patient has previously consented to UDS as part of the Controlled Substance and 
Treatment Agreement. The reason for today's UDS is: patient was selected at 
random and scored as moderate risk on the opioid risk assessment. Creatinine has
been ordered as well for specimen validity, not for kidney function. Preliminary
UDS results are not final and should not be used to determine patient care or 
plan of treatment. Initially a qualitative immunoassay screen will be done. Any 
positive findings or negative findings that are out of compliance will be 
further tested with a more comprehensive quantitative confirmation LCMS study. 
It is part of the normal prescribing protocol of controlled substances and is 
considered standard of care.         Signatures Electronically signed by : Valentina Ramirez NP; Aug 11 2020  2:59PM EST                       (Author)  
Electronically signed by : Sourav Shepherd MD; Aug 11 2020  3:07PM EST               
       









          Name      Value     Range     Interpretation Code Description Data Alla

rce(s) Supporting 

Document(s)

 

                                                                       









                    ID                  Date                Data Source

 

                    [id number]          2020 12:00:00 AM EDT [facility]

 

                                        ________________________________________

________________________Teri Dickerson MD                  Patient Name: YUSRA DIETZ [id number]  [address]                  
  Y[facility]: 1960[facility]  [address]                    Date of Exam:
2020#: [phone]: 
[phone]________________________________________________________________EXAM: 
MAMMO SCREENING  WITH CADCLINICAL INFORMATION:  Screening.Comparison 2019 as
well as other prior exams.Family history of mother with breast cancer at age 58,
grandmother withbreast cancer at age 60 and three cousins with breast 
cancer.Based on the personal health history and familial cancer history 
yourpatient supplied at the time of imaging, her lifetime risk of breast 
cancerestimated by the Tyrer-Cuzick model is 28.1%.  However, due to the unk
nowngene mutation status, there are limitations to the accuracy of this 
riskestimate.  Similarly, if anything changes in the personal and/or 
familyhistory this percentage could increase or decrease.  Currently, 
theNational Comprehensive Cancer Network and American Cancer Society 
recommendadjunctive breast MRI screening starting at age 30 for women with a> 
20-25% lifetime risk of developing breast cancer.Based on the personal and 
family history information your patient suppliedat the time of imaging, your 
patient meets the National ComprehensiveCancer Network testing criteria and 
elected to meet with our hereditarycancer specialist which may include genetic 
testing.  Results are pending.This test result could increase or decrease your 
patient's breast cancerrisk estimate.  Addendum to follow.Digital screening (2D)
mammography was performed bilaterally.Additionally, breast tomosynthesis (3D 
mammography) was performedbilaterally in  the CC and MLO projections and 
compared to the priorexam(s).There has been no change in appearance of the 
mammogram from studies.There is a mild amount of residual fibroglandular tissue 
remaining, whichis fairly symmetric.  There has been no interval development of 
dominantmasses, areas of structural distortion, or clusters of 
microcalcificationstypical of malignancy.  Scattered lymph nodes are seen in the
axilla.The Volpara volumetric breast density category is B, there are 
scatteredareas of fibroglandular density.IMPRESSION:BI-RADS Category 1 - 
Negative Mammogram.  Currently no mammographicevidence of malignancy.  Routine 
followup is recommended in one year.This mammogram was read with the assistance 
of NeverfailPATRICK PowerLook AMP, an FDAapproved computer aided detection system for 
mammography.Negative x-ray reports should not delay surgical consultation if a 
dominantor clinically suspicious mass is present.Not all breast cancers can be 
identified by mammography.  Therefore, werecommend that you continue to perform 
regular breast self-examination andphysical examination and then promptly 
contact your physician of anyconcerns or changes.Adenosis and dense breasts may 
obscure an underlying neoplasm.The patient states that a clinical breast 
examination was over a year ago. Given the elevated lifetime risk of breast 
cancer 20% or greater, yearlysupplemental screening MRI of the breasts is 
recommended in addition toannual screening mammography, staggered every 6 
months.BETY Loco/Sandra you for referring YUSRA DIETZ to our 
office.     Electronically Signed - ETHAN WEBB MD  20 18:18   









          Name      Value     Range     Interpretation Code Description Data Alla

rce(s) Supporting 

Document(s)

 

                                                                       









                    ID                  Date                Data Source

 

                    [id number]            07/10/2020 10:05:06 AM EDT [facility]

 

                                        [facility]Name: Kandis DoshisDOB: 1960Provider: Loulou Brown: 2020 Chief ComplaintPatient presents with low back and leg pain 
Chief Complaint 2Patient presents with left arm numbness [place] VAS PAIN 
Established:  MA completing section: ASmithLPN   History of Present IllnessA 
Urine Drug Screen was ordered for Yusra Dietz and collected on site today 
2020. Creatinine has been ordered as well for specimen validity, not for 
kidney function. Preliminary UDS results are not final and should not be used to
determine patient care or plan of treatment. Initially a qualitative immunoassay
screen will be done. Please note this is not a dip test. Any positive findings 
or negative findings that are out of compliance will be further tested with a 
more comprehensive quantitative confirmation LCMS study. It is part of the 
normal prescribing protocol of controlled substances and is considered standard 
of care. Do you have a Brace for your condition? Patient does not have a brace 
for their condition. Do you have a TENS Unit for your condition? Patient does 
not have a TENS Unit for their condition. Supplements: Patient is not currently 
taking any supplements. Nerve Conduction: Patient has had a Nerve Conduction 
Test. Recent test/procedures: Patient was asked and denies having any tests 
since their last visit. Patient was asked and denies being seen by any 
Physicians since their last visit. The patient was last seen by a [facility] provider on 2020. At today's visit patient presents with their 
Self Patient is not currently working. The patient is being seen for a follow-
up.       Pain Duration: years   Pain Quality: (Neuropathic)  burning Pain 
Quality: (Nociceptive)  aching and stabbing Timing:  constant Progression:  
unchanged Palliation:  block, changing position, opioid analgesics, rest and 
resting/laying down Exacerbating:  lifting, standing and walking Pain Score:  a 
current pain level of 7/10, a minimum pain level of 4/10 and a maximum pain 
level of 10/10. Condition type: The patient is being seen for a chronic 
condition. PAIN LOCATION:  the pain is located in the low back ,  (weakness , 
achy and throbbing pain) radiates to the right buttock, left buttock, right hip,
left hip, right thigh, right knee, right calf/shin, right ankle, right big toe 
and right pinky toe, the pain is greater on the right side more than the left 
and the pain is in the leg equally with the back. The etiology of this 
injury/condition is unknown. RELATIONSHIP TO INJURY: This condition is not 
related to a specific injury. INJURY MECHANISM: The injury resulted from  no 
known physical event. REVIEW OF PAST DIAGNOSTICS: have included:  MRI and 
electromyography/nerve conduction studies . Records were obtained, reviewed and 
on file. PAST TREATMENT has included:  NONSTEROIDAL ANTI-INFLAMMATORY drugs (not
effective) Includes Mobic., but ANTICONVULSANTS (effective) Includes Lyrica., 
OPIOID ANALGESICS (effective) Includes. hydrocodone does not work. fentanyl 
patch seems to be working well., cervical surgery 2015 (effective). - Radio
Frequency Pertinent Information: On a RADIO FREQUENCY ABLATION of the BILATERAL,
LUMBAR FACET JOINTS under fluoroscopy as confirmed by previous successful medial
branch nerve blocks. Targeting the.  80 % of pain relief was provided which is 
ongoing. Allows patient to increase activity and functionality Including the 
following activities for longer periods of time with less pain: activities of 
daily living, better sleep, walking, standing, sitting and grocery shop. And to 
decrease the following pain medication(s): 19, 19 DR. RASHID. Good 
reduction of pain in the low back and able to walk better. INTERVAL EVENTS: 
include . She is having more pain in low back and in her left arm with some 
numbness...she has been taking care of her grand babies and this has been 
especially stressful for her..she can not get a block until she knows what is 
happening with her grand kids because she has had to take care of them over the 
last couple of months. ASSOCIATED SYMPTOMS: include difficulty sleeping , 
difficulty walking, radiating, extremity weakness:  left, lower extremity, 
right. and urinary incontinence, but no fecal incontinence. FUNCTIONAL 
LIMITATIONS: The patient's functional status is limited as follows: ability to  
perform activities of daily living, participate in aerobic activity, participate
in hobbies, perform housework and maintain normal sleep pattern. MEDICATION SIDE
EFFECTS experienced by patient are:  drowsiness.   Review of 
SystemsConstitutional: Normal. Eyes: Normal. ENT: normal. Cardiovascular: 
Normal. Respiratory: Normal. Gastrointestinal: Normal. Genitourinary: Normal. 
Musculoskeletal: lower back pain, midback pain, neck pain, joint pain and limb 
pain. Integumentary: Normal. Neurological: numbness (in left arm). Psychiatric: 
Normal. Endocrine: Normal. Hematologic/Lymphatic: Normal.   Patient maintains at
today's visit there has been no change in his/her hematologic history.   I 
reviewed the above with the patient and I feel the ROS to be negative/normal 
other than neck, low back and joint pain.   Active Problems 1. Chronic hip pain,
left (719.45,338.29) (M25.552,G89.29) 2. Chronic low back pain (724.2,338.29) 
(M54.5,G89.29) 3. Facet arthropathy, lumbar (721.3) (M47.816) 4. History of 
depression (V11.8) (Z86.59) 5. History of fibromyalgia (V13.59) (Z87.39) 6. 
History of osteoarthritis (V13.4) (Z87.39) 7. Long term current use of opiate 
analgesic (V58.69) (Z79.891) 8. Lumbar radiculopathy (724.4) (M54.16) 9. Lumbar 
spondylosis (721.3) (M47.816) 10. Osteoarthritis of hip (715.95) (M16.9) 11. 
Primary osteoarthritis of hip (715.15) (M16.10) 12. Spondylolisthesis, lumbar 
region (738.4) (M43.16) Allergies  Morphine Derivatives Itching; Updated By: 
Geno Zapata; 2018 3:14:52 PMonly iv morphineDenied   Adhesive Tape 
Recorded By: Alesha Hollis; 2016 12:56:08 PM  Iodinated Contrast Media 
Recorded By: Alesha Hollis; 2016 12:56:08 PM  Latex Recorded By: Alesha Hollis; 2016 12:56:08 PM Current Meds  Aleve-D Sinus  Cold TB12;Therapy: 
(Recorded:2017) to Recordedld 10/9/17 am  Aspirin  MG Oral Tablet 
Delayed Release; take one pill po every day;Therapy: 05Zki1083 to 
(Evaluate:2018) Recordedself prescribed  Lexapro 20 MG Oral Tablet;Therapy:
(Recorded:56Ecc1949) to Recorded  Magnesium Oxide 400 MG Oral Capsule;Therapy: 
40Hfy8566 to Recorded  Metrogel GEL;Therapy: (Recorded:52Ckb3144) to Recorded  
Minocycline HCl CAPS;Therapy: (Recorded:43Xgu8613) to Recorded  Morphine Sulfate
ER 15 MG Oral Tablet Extended Release; TAKE 1 TABLET EVERY 8HOURS MDD:3;Therapy:
09Jpz7704 to (Evaluate:92Tnv6318)  Requested for: 2020; LastRx:43Ryc0848 
OrderedLD 2020  oxyCODONE-Acetaminophen  MG Oral Tablet; TAKE 1 TABLET
3 times daily PRNPAIN MDD:3;Therapy: 72Bqf1990 to (Evaluate:89Azn2137)  
Requested for: 2020; LastRx:32Mxu8595 OrderedLD 2020  Potassium 
Gluconate 595 (99 K) MG Oral Tablet;Therapy: (Recorded:2016) to Recorded  
Protonix 40 MG Oral Tablet Delayed Release;Therapy: 2018 to Recorded  
Wellbutrin TABS;Therapy: (Recorded:2018) to Recorded Past Medical History  
History of Chronic headaches (784.0) (R51)  Denied: History of blood coagulation
disorder  History of degenerative disc disease (V13.59) (Z87.39)  History of 
depression (V11.8) (Z86.59)  History of fibromyalgia (V13.59) (Z87.39)  Assessed
By: Arabella Brown (Pain Management); Last Assessed: 2017  History of 
hepatitis A virus infection (V12.09) (Z86.19)  History of kidney stones (V13.01)
(Z87.442)  History of osteoarthritis (V13.4) (Z87.39)  Assessed By: Arabella Brown
(Pain Management); Last Assessed: 2017  History of small bowel 
obstruction (V12.79) (Z87.19)    History of Not currently pregnant (V49.89) 
(Z78.9)  Denied: History of On anticoagulant therapy Surgical History  History 
of Appendectomy  3/2013  History of Back Surgery  2015    C5 disk replacement 
History of Gastric Surgery For Morbid Obesity  2006  History of Hernia Repair  
2013  History of Hip Replacement Left  History of Hysterectomy  3/2004  
Denied: History of Implantable Cardioverter-Defibrillator  History of Knee 
Arthroplasty  History of Knee Replacement  2005  History of Knee Surgery  
2015  Denied: History of Pacemaker Placement  History of Total Hip Replacement
  Family History  Family history of arthritis (V17.7) (Z82.61)  Family 
history of cardiac disorder (V17.49) (Z82.49)  Family history of cardiac 
disorder (V17.49) (Z82.49)  Family history of diabetes mellitus (V18.0) (Z83.3) 
Family history of malignant neoplasm (V16.9) (Z80.9) Social History  Current 
non-drinker of alcohol (V49.89) (Z78.9)  Denied: History of Drug use    
Never a smoker  Not currently employed VitalsVital Signs Recorded: 06Zbf3625 
03:39PM Height: 5 ft 6 inWeight: 212 lb BMI Calculated: 34.22BSA Calculated: 
2.05Systolic: 125, SittingDiastolic: 70, SittingHeart Rate: 61Respiration: 
16Temperature: 96.4 FHeight measured w/wo shoes: w/shoesPain Scale: 6 Physical 
ExamGeneral: The patient is a well nourished/well developed, female, heavy set, 
who is in mild distress and appears stated age. Eyes: Lids are atraumatic, no 
lesions, sclerae are anicteric. Ears, Nose, Mouth, Throat: external ears and 
nose without trauma. Respiratory: Normal chest expansion and respiratory effort.
Cervical Spine:- Palpation/Tenderness: Tenderness on palpation of the left: 
negative left paraspinal and negative left trapezius muscle. Tenderness on 
palpation of the RIGHT: negative right paraspinal and negative right trapezius 
muscle. - ROM:. Range of motion increases pain.Right Upper Extremity Motor: - 
Wrist: 5/5 flexion, 5/5 extension- Elbow: 5/5 flexion, 5/5 extension- Shoulder: 
5/5 Abduction, 5/5 Adduction- Hand  5/5 Left Upper Extremity Motor:- Wrist: 
5/5 flexion, 5/5 extension- Elbow: 5/5 flexion, 5/5 extension- Shoulder: 5/5 
Abduction, 5/5 Adduction- Hand  5/5 Neurological:Sensation Upper:Left Upper:
numbnesRight Upper: normal. Lumbosacral Spine: - Inspection: normal appearance. 
No deformity, ecchymosis, erythema or swelling noted. Normal Lordosis.. - 
Palpation/Tenderness: Tenderness on palpation of the LEFT: paraspinal  and 
sacroiliac joint, but negative left sciatic notch. - Palpation/Tenderness: 
Tenderness on palpation of the RIGHT: paraspinal  and sacroiliac joint, but 
negative right sciatic notch. - ROM Flexion: was painful. - ROM Extension: was 
painful.Right Lower Extremity Motor:- Hip: 4/4 flexion, 4/5 extension- Knee: 4/5
flexion, 4/5 extension- Foot: 5/5 Plantar , 5/5 DorsiFlexionSpecial Tests: flip 
test was negative, positive facet challenge  and positive faberLeft Lower 
Extremity Motor:- Hip: 4/5 flexion, 4/5 extension- Knee: 4/5 flexion, 4/5 
extension- Foot: 5/5 DorsiFlexionSpecial Tests: flip test was negative, positive
facet challenge  and positive faberNeurological:Sensation Left Lower: 
normalSensation Right Lower: normal. Skin: Warm, dry, acyanotic. Psychological: 
Alert and oriented to person, place and time. Mood and affect are pleasant and 
appropriate. Judgement intact. Insight normal without delusions or halluci
nations.  Denies suicidal/homicidal ideation.   Results/DataThis patient had a 
urine drug screen on 2020. Results are in compliance.   Assessment 1. 
Chronic low back pain (724.2,338.29) (M54.5,G89.29) 2. Facet arthropathy, lumbar
(721.3) (M47.816) 3. Lumbar radiculopathy (724.4) (M54.16) 4. Spondylolisthesis,
lumbar region (738.4) (M43.16) Plan 1. Renew: Morphine Sulfate ER 15 MG Oral 
Tablet Extended Release; TAKE 1 TABLET EVERY 8 HOURS MDD:3LD 2020 2. Renew: 
oxyCODONE-Acetaminophen  MG Oral Tablet; TAKE 1 TABLET 3 times daily PRN 
PAIN MDD:3LD 2020 3. UDS-LAB Medicare / Commercial ([facility] Urine Drug SCreen);
[Do Not Release]; Specimen Source:Urine; Status:In Progress - Specimen/Data 
Collected;   Done: 81Hti5780 4. 30 Day RX Management  Follow-up  Status: Hold 
For - Scheduling  Requested for: 71Dty8334). Schedule (FUP) 60 days from today: 
: 90Bnr0266). Is an additional appointment needed....? : Yes). Schedule 30 day 
RX from TODAY's date (2 days +/- either way): : 64Rwh8726 In my opinion based on
subjective and objective findings from today's visit the patient is worsening. 
Medication:.  [place]  Information:  was consulted by my designee and I have 
reviewed the information presented to me and find no aberrant compliance issues.
Patient has been informed.  Controlled Substance Prescribed: MORPHINE ER, 
PERCOCET . CONTROLLED SUBSTANCE INFORMATION: I advised the patient today/pr
eviously regarding treatment with the above controlled substance and/or 
narcotic. The patient was then informed of the risks, benefits, and alternatives
of the narcotic. The risks discussed included but were not limited to physical 
and/or psychological dependence, tolerance of the medication, drowsiness, 
sleepiness, balance/coordination problems, confusion, allergic reaction or any 
other abnormal symptoms. The patient was advised and agreed to use the 
medication only as prescribed, and not to drive, or operate heavy equipment or 
machinery. The patient understood and consented to both undergo a narco
tic/opiate regimen and agreed to sign and comply with all of the [facility] terms of a controlled substance treatment and agreement. 
The patient is on the lowest possible dose of opioids. The patient denies 
adverse side effects and does not demonstrate any aberrant drug taking 
behaviors. The patient is able to perform ADL's  including the following 
activities for longer periods of time with less pain: activities of daily 
living, sleeping, walking, standing, sitting, grocery shopping . There are no 
changes in current medications at this visit. Patient instructed to continue c
urrent regimen.  The patient is on the lowest possible dose of opioids. The 
patient denies adverse side effects and does not demonstrate any aberrant drug 
taking behaviors. The patient is able to perform ADL's  including the following 
activities for longer periods of time with less pain: activities of daily 
living, sleeping, walking, standing, sitting, grocery shopping . The prescribed 
medications are medically necessary for pain management and rehabilitation. UDS:
This is an established patient and is on ongoing opioid therapy. A UDS is being 
obtained today, the patient has previously consented to UDS as part of the 
Controlled Substance and Treatment Agreement. The reason for today's UDS is: 
patient was selected at random and scored as moderate risk on the opioid risk 
assessment. Creatinine has been ordered as well for specimen validity, not for 
kidney function. Preliminary UDS results are not final and should not be used to
determine patient care or plan of treatment. Initially a qualitative immunoassay
screen will be done. Any positive findings or negative findings that are out of 
compliance will be further tested with a more comprehensive quantitative 
confirmation LCMS study. It is part of the normal prescribing protocol of 
controlled substances and is considered standard of care.  Treatment includes: 
PROCEDURE(S): The patient defers blocks/procedures at this time THERAPIES: 
Therapy Treatment Plan: Deferred: All Therapies: Deferred: per patient request. 
EMG-NERVE CONDUCTION STUDY UPPER EXTREMITY:  (EMG RECOMMENDED SHE DEFERS AT THIS
TIME) FOLLOW UP: The patient should have a follow up 30 Day RX. CONTINUE 
TREATMENT: Yusra will continue with the following:. Yusra is participating in a 
home exercise program and is encouraged to continue. - : The patient was 
counseled on the following:  treatment plan and future treatment options.       
Discussion/SummaryYusra is seen for her neck, low back and limb pain. This has 
been worsening over the last few months. The increase in pain may be due to 
taking care of her grand kids almost full time and stress.  She feels unable to 
do a block until she is sure that she will not be called upon to care for her 
grand children so for now she needs to wait. The meds help at current doses that
are a bit higher than before. The goal will be to do a block and reduce the pain
meds she hopes that by September she will know if she can do a block. She will 
be seen in a month. She also could benefit from an emg for her numbness in her 
left arm ...she also defers this.   Signatures Electronically signed by : Arabella Brown NP; 2020  5:20PM EST                       (Author)  
Electronically signed by : Fabricio Camacho MD; Jul 10 2020 10:05AM EST             
         









          Name      Value     Range     Interpretation Code Description Data Alla

rce(s) Supporting 

Document(s)

 

                                                                       









                    ID                  Date                Data Source

 

                    [id number]            2020 08:27:02 AM EDT [facility]

 

                                        [facility], PCName: Kandis kay ChildsDOB: 1960Provider: Loulou Brown: 2020 Chief Complaint 2Patient presents with low back pain and 
thigh pain on the left side.  She is also having some pain down her left arm to 
the tip of middle finger.  MA completing section:  Explanation to patient 
regarding telemedicine Patient initiated contact with the office via phone call 
or via patient portal to schedule a Telehealth visit. I explained to the patient
that telemedicine is the practice of using telecommunications technology to 
evaluate, diagnose and care for patients at a distance. Telehealth visits are a 
way for [facility] to continue providing quality care to our patients while still 
practicing mandated social distancing, in emergency effort to keep both the 
patient and myself safe throughout the COVID-19 pandemic. During this emergent 
initiation of Telemedicine, the office of civil rights has loosened the laws on 
HIPAA compliance to accommodate the continuation of medical care across the [place]
throughout the COVID-19 pandemic.  Telehealth Consent Yusra Dietz is an 
established patient of [facility], and was made aware co-pays and co-insurance may be
waived by their insurer due to COVID-19, as well as, the potential privacy risks
with the use of third-party applications as a result of this Telehealth visit. 
The patient has verbalized consent to proceed with a Telehealth visit via audio 
with live video calling. Location of Provider and Patient for this telehealth 
visit: Provider is located at my residence in [facility] Patient is located at home, in
[place], [place] The following were present during this visit: patient was alone 
Application used for the telehealth visit: [employer]   History of Present 
IllnessThe patient is being seen today for refill of prescriptions for 
controlled substances.      The date of onset of symptoms is approximately 
YEARS.   Current Meds 1. Aleve-D Sinus  Cold TB12; Therapy: (Recorded:2017)
to Recorded 2. Aspirin  MG Oral Tablet Delayed Release; take one pill po 
every day; Therapy: 14Ulh0289 to (Evaluate:2018) Recorded 3. Lexapro 20 MG 
Oral Tablet; Therapy: (Recorded:91Mdd2638) to Recorded 4. Magnesium Oxide 400 MG
Oral Capsule; Therapy: 44Zha7806 to Recorded 5. Metrogel GEL; Therapy: 
(Recorded:85Lnh9264) to Recorded 6. Minocycline HCl CAPS; Therapy: 
(Recorded:66Xic5515) to Recorded 7. Morphine Sulfate ER 15 MG Oral Tablet 
Extended Release; TAKE 1 TABLET EVERY 8 HOURS MDD:3; Therapy: 08Fqw7159 to 
(Evaluate:91Enn1883)  Requested for: 2020; Last Rx:2020 Ordered 8. 
oxyCODONE-Acetaminophen  MG Oral Tablet; Take 1 tablets every 12 hours as 
needed for pain MDD:2; Therapy: 95Csu4583 to (Evaluate:57Vlt6819)  Requested 
for: 2020; Last Rx:76Fes4565 Ordered 9. Potassium Gluconate 595 (99 K) MG 
Oral Tablet; Therapy: (Recorded:2016) to Recorded 10. Protonix 40 MG Oral 
Tablet Delayed Release;  Therapy: 2018 to Recorded 11. Wellbutrin TABS;  
Therapy: (Recorded:2018) to Recorded Allergies  Morphine Derivatives 
Itching; Updated By: Geno Zapata; 2018 3:14:52 PMonly iv 
morphineDenied   Adhesive Tape Recorded By: Alesha Hollis; 2016 12:56:08 
PM  Iodinated Contrast Media Recorded By: Alesha Hollis; 2016 12:56:08 PM 
Latex Recorded By: Alesha Hollis; 2016 12:56:08 PM NotesNYSW 30 Day RX 
Note: Chief complaint: Patient is here today for 30 day refill of their 
controlled substance prescription Patient is being treated with chronic opioid 
therapy for low back and leg pain and now some arm pain too.  Patient denies 
side effects related to chronic opioid use and has been re-educated regarding th
e controlled substance agreement. In my opinion patient continues to receive 
primary benefit with chronic opioid therapy. MORPHINE ER, OXYCODONE . CONTROLLED
SUBSTANCE INFORMATION: I advised the patient today/previously regarding 
treatment with the above controlled substance and/or narcotic. The patient was 
then informed of the risks, benefits, and alternatives of the narcotic. The 
risks discussed included but were not limited to physical and/or psychological 
dependence, tolerance of the medication, drowsiness, sleepiness, 
balance/coordination problems, confusion, allergic reaction or any other abnorm
al symptoms. The patient was advised and agreed to use the medication only as 
prescribed, and not to drive, or operate heavy equipment or machinery. The 
patient understood and consented to both undergo a narcotic/opiate regimen and 
agrees to comply with all of the [facility] terms of a 
controlled substance treatment and agreement.  I am giving the patient a 30 day 
refill without changes. The patient consents to move forward with treatment. 
...OPIOID ABUSE is a national epidemic that Physicians and other prescribers 
have the power to help prevent. In our opioid monitoring surveillance to help 
minimize misuse and diversion, moderate to high risk patients are required to 
have face to face 30 day refill of opioids. This will help minimize the 
potential for electronic false requests, deterring potential fraudulent 
behavior. [facility]  Information:  was consulted by my designee and I have 
reviewed the information presented to me and find no aberrant compliance issues.
Patient has been informed.    Physical ExamGeneral: The patient is a well 
nourished/well developed, female, heavy set, who is in mild distress and appears
stated age. Eyes: Lids are atraumatic, no lesions, sclerae are anicteric. Ears, 
Nose, Mouth, Throat: external ears and nose without trauma. Respiratory: Normal 
chest expansion and respiratory effort. Psychological: Alert and oriented to 
person, place and time. Mood and affect are pleasant and appropriate. Judgement 
intact. Insight normal without delusions or hallucinations.  Denies 
suicidal/homicidal ideation.   Results/DataNYSW UDS Prior UDS Results Detail For
m: This patient had a urine drug screen on 2020. Results are in compliance. 
 Assessment 1. Chronic low back pain (724.2,338.29) (M54.5,G89.29) 2. Lumbar 
radiculopathy (724.4) (M54.16) 3. Lumbar spondylosis (721.3) (M47.816) 4. 
Spondylolisthesis, lumbar region (738.4) (M43.16) Plan 1. Renew: Morphine 
Sulfate ER 15 MG Oral Tablet Extended Release; TAKE 1 TABLET EVERY 8 HOURS 
MDD:-3/5/2020 11AM 2. Renew: oxyCODONE-Acetaminophen  MG Oral Tablet; 
TAKE 1 TABLET 3 times daily PRN PAIN MDD:3LD-3/5/2020  11AM [facility] Treatment Plan 
(2): In my opinion based on subjective and objective findings from today's visit
the patient is worsening. Medication:. Due to the current COVID-19 pandemic, 
telemedicine does not allow for the collection of urine drug screening for 
initiation or chronic use of opioid medications. Patient will be subject to 
urine drug screening at next in-office visit. Medication list was reviewed with 
the patient, and updates were made to reflect her current medication regimen.  
Allergy list was reviewed with patient, and any necessary changes were made.    
[facility]  Information:  was consulted by my designee and I have reviewed the 
information presented to me and find no aberrant compliance issues. Patient has 
been informed.  Controlled Substance Prescribed: MORPHINE ER, OXYCODONE . 
CONTROLLED SUBSTANCE INFORMATION: I advised the patient today/previously 
regarding treatment with the above controlled substance and/or narcotic. The 
patient was then informed of the risks, benefits, and alternatives of the 
narcotic. The risks discussed included but were not limited to physical and/or 
psychological dependence, tolerance of the medication, drowsiness, sleepiness, 
balance/coordination problems, confusion, allergic reaction or any other 
abnormal symptoms. The patient was advised and agreed to use the medication only
as prescribed, and not to drive, or operate heavy equipment or machinery. The 
patient understood and consented to both undergo a narcotic/opiate regimen and 
agreed to sign and comply with all of the [facility] 
terms of a controlled substance treatment and agreement. The patient is on the 
lowest possible dose of opioids. The patient denies adverse side effects and 
does not demonstrate any aberrant drug taking behaviors. The patient is able to 
perform ADL's  including the following activities for longer periods of time 
with less pain: activities of daily living, sleeping, walking, standing, 
sitting, grocery shopping . There are no changes in current medications at this 
visit. Patient instructed to continue current regimen.  The patient is on the 
lowest possible dose of opioids. The patient denies adverse side effects and 
does not demonstrate any aberrant drug taking behaviors. The patient is able to 
perform ADL's  including the following activities for longer periods of time 
with less pain: activities of daily living, sleeping, walking, standing, 
sitting, grocery shopping . The prescribed medications are medically necessary 
for pain management and rehabilitation. Treatment includes: PROCEDURE(S): The 
patient defers blocks/procedures at this time THERAPIES: Therapy Treatment Plan:
Deferred: All Therapies: Deferred: per patient request. FOLLOW UP: The patient 
should have a follow up visit in 1 month. CONTINUE TREATMENT: Yusra will continue
with the following:. Yusra is participating in a home exercise program and is 
encouraged to continue. - : The patient was counseled on the following:  
treatment plan and future treatment options. TIme:. Time: Start time: 15:46 P.M.
End time: 15:58. P.M.        Discussion/SummaryYusra is seen today for her low 
back leg and now arm pain. She has been taking care of both of her grand kids 
age 8 months and 5 yrs almost constantly due to her son being called in to work 
the riots that have been occurring.  The kids mother is in the hospital at this 
time. She states that she can not get a block for this reason and is asking for 
an increase in her short acting pain meds.  i have explained that we really do 
try to limit those meds to no more than mdd 2 a day. This this month we will go 
up to mdd 3 but she is aware that after this month she has to find some time to 
get a block she is over do for the RF procedure. She understands that the pain 
meds are not a solution to the pain and that she really will have to consider 
doing some injections they have worked in the past. She is also having some new 
arm pain and pain into her fingers which will need to be worked up next visit 
too.   Signatures Electronically signed by : Arabella Brown NP; 2020  
4:04PM EST                       (Author)  Electronically signed by : Cristian Stovall MD; 2020  8:26AM EST                       









          Name      Value     Range     Interpretation Code Description Data Alla

rce(s) Supporting 

Document(s)

 

                                                                       









                    ID                  Date                Data Source

 

                    [id number]            2020 06:52:40 PM EDT [facility], PCName: Kandis DoshisDOB: 1960Provider: Loulou Brown: 2020 Chief ComplaintPatient presents with low back and leg and 
joint pain  Chief Complaint 2Patient presents with neck  pain [facility] VAS PAIN 
Established:  MA completing section:  [facility] Telemedicine - Explanation to patient
regarding Telemedicine: Explanation to patient regarding telemedicine Patient 
initiated contact with the office via phone call or via patient portal to 
schedule a Telehealth visit. I explained to the patient that telemedicine is the
practice of using telecommunications technology to evaluate, diagnose and care 
for patients at a distance. Telehealth visits are a way for [facility] to continue 
providing quality care to our patients while still practicing mandated social 
distancing, in emergency effort to keep both the patient and myself safe 
throughout the COVID-19 pandemic. During this emergent initiation of 
Telemedicine, the office of civil rights has loosened the laws on HIPAA 
compliance to accommodate the continuation of medical care across the [place] 
throughout the COVID-19 pandemic.  [facility] Telemedicine - TV Consent Established: 
Telehealth Consent Yusra Dietz is an established patient of [facility], and was made 
aware co-pays and co-insurance are waived due to COVID-19, as well as, the 
potential privacy risks with the use of third-party applications as a result of 
this Telehealth visit. The patient has verbalized consent to proceed with a 
Telehealth visit via audio with live video calling. Location of Provider and 
Patient for this telehealth visit: Provider is located at my residence in [facility] 
Patient is located at home, in [place], [place]  The following were present during 
this visit: patient was alone Application used for the telehealth visit: Whats 
[place]   History of Present IllnessDo you have a Brace for your condition? Patient 
does not have a brace for their condition. Do you have a TENS Unit for your 
condition? Patient does not have a TENS Unit for their condition. Supplements: 
Patient is currently taking the following supplements: (see current med list). 
Nerve Conduction: Patient has had a Nerve Conduction Test. Recent 
test/procedures: Patient was asked and denies having any tests since their last 
visit. Patient was asked and denies being seen by any Physicians since their 
last visit. The patient was last seen by a [facility] provider 
on 2020. At today's visit patient presents with their Self Patient is not 
currently working. The patient is being seen for a follow-up.       Pain 
Duration: years   Pain Quality: (Neuropathic)  burning, tingling and pins and ne
edles Pain Quality: (Nociceptive)  aching, dull, sharp and stabbing Timing:  
constant Progression:  worsening Palliation:  block, changing position, 
exercises, non-opioid analgesics, opioid analgesics and stretching Exacerbating:
 bending, climbing stairs, house chores, lifting, standing, walking and weather 
Pain Score:  a current pain level of 7/10, a minimum pain level of 4/10 and a 
maximum pain level of 9/10. Condition type: The patient is being seen for a 
chronic condition. PAIN LOCATION:  the pain is located in the neck,  radiates to
the right shoulder and left shoulder, the pain is located in the low back ,  
(weakness , achy and throbbing pain) radiates to the right buttock, left 
buttock, right hip, left hip, right thigh, right knee, right calf/shin, right 
ankle, right big toe and right pinky toe, the pain is greater on the right side 
more than the left and the pain is in the leg equally with the back. The 
etiology of this injury/condition is unknown. RELATIONSHIP TO INJURY: This 
condition is not related to a specific injury. INJURY MECHANISM: The injury 
resulted from  no known physical event. REVIEW OF PAST DIAGNOSTICS: have 
included:  MRI and electromyography/nerve conduction studies . Records were 
obtained, reviewed and on file. PAST TREATMENT has included:  NONSTEROIDAL ANTI-
INFLAMMATORY drugs (not effective) Includes Mobic., but ANTICONVULSANTS 
(effective) Includes Lyrica., OPIOID ANALGESICS (effective) Includes. 
hydrocodone does not work. fentanyl patch seems to be working well., cervical 
surgery 2015 (effective). - Radio Frequency Pertinent Information: On a 
RADIO FREQUENCY ABLATION of the BILATERAL, LUMBAR FACET JOINTS under fluoroscopy
as confirmed by previous successful medial branch nerve blocks. Targeting the.  
80 % of pain relief was provided which is ongoing. Allows patient to increase 
activity and functionality Including the following activities for longer periods
of time with less pain: activities of daily living, better sleep, walking, 
standing, sitting and grocery shop. And to decrease the following pain 
medication(s): 19, 19 DR. RASHID. Good reduction of pain in the low back
and able to walk better. INTERVAL EVENTS: include . She is doing ok for the most
part taking care of her grand kids so this is keeping her busy. She states that 
she is too busy to worry about herself. The meds really do help. ASSOCIATED 
SYMPTOMS: include difficulty sleeping , difficulty walking, radiating, extremity
weakness:  left, lower extremity, right. and urinary incontinence, but no fecal 
incontinence. FUNCTIONAL LIMITATIONS: The patient's functional status is limited
as follows: ability to  perform activities of daily living, participate in 
aerobic activity, participate in hobbies, perform housework and maintain normal 
sleep pattern. MEDICATION SIDE EFFECTS experienced by patient are:  drowsiness. 
 Review of SystemsROS was reviewed with patient; I feel the ROS to be 
negative/normal other than  low back , neck and joint pain.    Patient maintains
at today's visit there has been no change in his/her hematologic history.     
Active Problems 1. Chronic hip pain, left (719.45,338.29) (M25.552,G89.29) 2. 
Chronic low back pain (724.2,338.29) (M54.5,G89.29) 3. Facet arthropathy, lumbar
(721.3) (M47.816) 4. History of depression (V11.8) (Z86.59) 5. History of 
fibromyalgia (V13.59) (Z87.39) 6. History of osteoarthritis (V13.4) (Z87.39) 7. 
Long term current use of opiate analgesic (V58.69) (Z79.891) 8. Lumbar 
radiculopathy (724.4) (M54.16) 9. Lumbar spondylosis (721.3) (M47.816) 10. 
Osteoarthritis of hip (715.95) (M16.9) 11. Primary osteoarthritis of hip 
(715.15) (M16.10) 12. Spondylolisthesis, lumbar region (738.4) (M43.16) 
Allergies  Morphine Derivatives Itching; Updated By: Geno Zapata; 
2018 3:14:52 PMonly iv morphineDenied   Adhesive Tape Recorded By: 
Alesha Hollis; 2016 12:56:08 PM  Iodinated Contrast Media Recorded By: 
Alesha Hollis; 2016 12:56:08 PM  Latex Recorded By: Alesha Hollis; 
2016 12:56:08 PM Current Meds  Aleve-D Sinus  Cold TB12;Therapy: 
(Recorded:2017) to Recordedld 10/9/17 am  Aspirin  MG Oral Tablet 
Delayed Release; take one pill po every day;Therapy: 56Tbx4288 to 
(Evaluate:2018) Recordedself prescribed  Lexapro 20 MG Oral Tablet;Therapy:
(Recorded:23Baj9208) to Recorded  Magnesium Oxide 400 MG Oral Capsule;Therapy: 
01Vrs2288 to Recorded  Metrogel GEL;Therapy: (Recorded:45Zri2257) to Recorded  
Minocycline HCl CAPS;Therapy: (Recorded:96Pez9037) to Recorded  Morphine Sulfate
ER 15 MG Oral Tablet Extended Release; TAKE 1 TABLET EVERY 8HOURS MDD:3;Therapy:
05Evy5698 to (Evaluate:80Mzt5276)  Requested for: 2020; LastRx:81Asx3430 
OrderedLD-3/5/2020 11AM  oxyCODONE-Acetaminophen  MG Oral Tablet; Take 1 
tablets every 12 hours asneeded for pain MDD:2;Therapy: 09Nxm7783 to 
(Evaluate:03Xle0668)  Requested for: 42Qxz7332; LastRx:57Rbv9628 OrderedLD-
3/5/2020  11AM  Potassium Gluconate 595 (99 K) MG Oral Tablet;Therapy: 
(Recorded:2016) to Recorded  Protonix 40 MG Oral Tablet Delayed 
Release;Therapy: 2018 to Recorded  Wellbutrin TABS;Therapy: 
(Recorded:2018) to Recorded Past Medical History  History of Chronic 
headaches (784.0) (R51)  Denied: History of blood coagulation disorder  History 
of degenerative disc disease (V13.59) (Z87.39)  History of depression (V11.8) 
(Z86.59)  History of fibromyalgia (V13.59) (Z87.39)  Assessed By: Arabella Brown 
(Pain Management); Last Assessed: 2017  History of hepatitis A virus 
infection (V12.09) (Z86.19)  History of kidney stones (V13.01) (Z87.442)  
History of osteoarthritis (V13.4) (Z87.39)  Assessed By: Arabella Brown (Pain 
Management); Last Assessed: 2017  History of small bowel obstruction 
(V12.79) (Z87.19)    History of Not currently pregnant (V49.89) (Z78.9)  
Denied: History of On anticoagulant therapy Surgical History  History of 
Appendectomy  3/2013  History of Back Surgery  2015    C5 disk replacement  
History of Gastric Surgery For Morbid Obesity  2006  History of Hernia Repair  
2013  History of Hip Replacement Left  History of Hysterectomy  3/2004  
Denied: History of Implantable Cardioverter-Defibrillator  History of Knee 
Arthroplasty  History of Knee Replacement  2005  History of Knee Surgery  
2015  Denied: History of Pacemaker Placement  History of Total Hip Replacement
  Family History  Family history of arthritis (V17.7) (Z82.61)  Family 
history of cardiac disorder (V17.49) (Z82.49)  Family history of cardiac 
disorder (V17.49) (Z82.49)  Family history of diabetes mellitus (V18.0) (Z83.3) 
Family history of malignant neoplasm (V16.9) (Z80.9) Social History  Current 
non-drinker of alcohol (V49.89) (Z78.9)  Denied: History of Drug use    
Never a smoker  Not currently employed Physical ExamGeneral: The patient is a 
well nourished/well developed, female, heavy set, who is in mild distress and a
ppears stated age. Eyes: Lids are atraumatic, no lesions, sclerae are anicteric.
currently wearing eyeglasses. Ears, Nose, Mouth, Throat: external ears and nose 
without trauma. Psychological: Alert and oriented to person, place and time. 
Mood and affect are pleasant and appropriate. Judgement intact. Insight normal 
without delusions or hallucinations.  Denies suicidal/homicidal ideation.   
Results/DataThis patient had a urine drug screen on 2020. Results are in 
compliance.   Assessment 1. Lumbar radiculopathy (724.4) (M54.16) 2. Chronic low
back pain (724.2,338.29) (M54.5,G89.29) 3. Facet arthropathy, lumbar (721.3) 
(M47.816) 4. Lumbar spondylosis (721.3) (M47.816) Plan 1. Renew: Morphine 
Sulfate ER 15 MG Oral Tablet Extended Release; TAKE 1 TABLET EVERY 8 HOURS 
MDD:3LD-3/5/2020 11AM 2. Renew: oxyCODONE-Acetaminophen  MG Oral Tablet; 
Take 1 tablets every 12 hours as needed for pain MDD:2LD-3/5/2020  11AM 3. 30 
Day RX Management  Follow-up  Status: Hold For - Scheduling  Requested for: 
76Vqm9716). Schedule (FUP) 60 days from today: : 87Hxl9422). Is an additional 
appointment needed....? : Yes). Schedule 30 day RX from TODAY's date (2 days +/-
either way): : 26Owt7758 In my opinion based on subjective and objective 
findings from today's visit the patient is worsening. Medication:. Due to the 
current COVID-19 pandemic, telemedicine does not allow for the collection of 
urine drug screening for initiation or chronic use of opioid medications. 
Patient will be subject to urine drug screening at next in-office visit. 
Medication list was reviewed with the patient, and updates were made to reflect 
her current medication regimen.  Allergy list was reviewed with patient, and any
necessary changes were made.    [facility]  Information:  was consulted by my 
designee and I have reviewed the information presented to me and find no 
aberrant compliance issues. Patient has been informed.  Controlled Substance 
Prescribed: MORPHINE ER, PERCOCET . CONTROLLED SUBSTANCE INFORMATION: I advised 
the patient today/previously regarding treatment with the above controlled 
substance and/or narcotic. The patient was then informed of the risks, benefits,
and alternatives of the narcotic. The risks discussed included but were not 
limited to physical and/or psychological dependence, tolerance of the 
medication, drowsiness, sleepiness, balance/coordination problems, confusion, 
allergic reaction or any other abnormal symptoms. The patient was advised and 
agreed to use the medication only as prescribed, and not to drive, or operate 
heavy equipment or machinery. The patient understood and consented to both und
ergo a narcotic/opiate regimen and agreed to sign and comply with all of the [facility] terms of a controlled substance treatment and 
agreement. The patient is on the lowest possible dose of opioids. The patient 
denies adverse side effects and does not demonstrate any aberrant drug taking 
behaviors. The patient is able to perform ADL's  including the following 
activities for longer periods of time with less pain: activities of daily 
living, sleeping, walking, standing, sitting, grocery shopping . There are no 
changes in current medications at this visit. Patient instructed to continue 
current regimen.  The patient is on the lowest possible dose of opioids. The 
patient denies adverse side effects and does not demonstrate any aberrant drug 
taking behaviors. The patient is able to perform ADL's  including the following 
activities for longer periods of time with less pain: activities of daily 
living, sleeping, walking, standing, sitting, grocery shopping . The prescribed 
medications are medically necessary for pain management and rehabilitation. 
Treatment includes: PROCEDURE(S): The patient defers blocks/procedures at this 
time THERAPIES: Therapy Treatment Plan: Deferred: All Therapies: Deferred: per 
patient request. FOLLOW UP: The patient should have a follow up 30 Day RX. 
CONTINUE TREATMENT: Yusra will continue with the following:. Yusra is 
participating in a home exercise program and is encouraged to continue. - 
: The patient was counseled on the following:  treatment plan and future 
treatment options. TIme:. Time: Start time: 15:33 P.M. End time: 15:45. P.M.    
   Discussion/SummaryYusra is seen today via telehealth video for her low back 
and joint pain and now neck pain. This was done due to Covid 19 and she did 
consent to the visit and stated her name and date of birth for the record.  She 
is having more pain and has been taking care of her grand kids so this is pretty
stressful. She states that she has been thinking about doing a block but at this
time she defers this also. She states that the meds take the edge off and are 
helpful. She is staying pretty active. I have again reviewed that she is 
potentially a MILD candidate and she know this. She defers things and will be 
seen in a month.   Signatures Electronically signed by : Arabella Brown NP; May  6
2020  3:50PM EST                       (Author)  Electronically signed by : Cristian Stovall MD; May  7 2020  6:52PM EST                       









          Name      Value     Range     Interpretation Code Description Data Alla

rce(s) Supporting 

Document(s)

 

                                                                       









                    ID                  Date                Data Source

 

                    [id number]            2020 02:51:37 PM EDT [facility], PCName: Kandis DoshisDOB: 1960Provider: Vicky Miller: 2020 Chief Complaint 2Patient states she has back pain today 
Explanation to patient regarding telemedicine Patient initiated contact with the
office via phone call or via patient portal to schedule a Telehealth visit. I 
explained to the patient that telemedicine is the practice of using 
telecommunications technology to evaluate, diagnose and care for patients at a 
distance. Telehealth visits are a way for [facility] to continue providing quality 
care to our patients while still practicing mandated social distancing, in 
emergency effort to keep both the patient and myself safe throughout the COVID-
19 pandemic. During this emergent initiation of Telemedicine, the office of 
civil rights has loosened the laws on HIPAA compliance to accommodate the 
continuation of medical care across the [place] throughout the COVID-19 pandemic.  
Telehealth Consent Yusra Dietz is an established patient of [facility], and was made 
aware co-pays and co-insurance are waived due to COVID-19, as well as, the 
potential privacy risks with the use of third-party applications as a result of 
this Telehealth visit. The patient has verbalized consent to proceed with a 
Telehealth visit via audio with live video calling. Location of Provider and 
Patient for this telehealth visit: Provider is located at my residence in [facility] 
Patient is located at home, in [place], [place] The following were present during 
this visit:  Application used for the telehealth visit: [employer]   
History of Present IllnessThe patient is being seen today for refill of 
prescriptions for controlled substances.      The date of onset of symptoms is 
approximately years.   Current Meds 1. Aleve-D Sinus  Cold TB12; Therapy: 
(Recorded:2017) to Recorded 2. Aspirin  MG Oral Tablet Delayed 
Release; take one pill po every day; Therapy: 20Mqq6464 to (Evaluate:2018) 
Recorded 3. Lexapro 20 MG Oral Tablet (Escitalopram Oxalate); Therapy: 
(Recorded:70Uhk1593) to Recorded 4. Magnesium Oxide 400 MG Oral Capsule; 
Therapy: 12Zcw3401 to Recorded 5. Metrogel GEL (MetroNIDAZOLE); Therapy: 
(Recorded:86Ita8188) to Recorded 6. Minocycline HCl CAPS; Therapy: 
(Recorded:23Otk3358) to Recorded 7. Morphine Sulfate ER 15 MG Oral Tablet 
Extended Release; TAKE 1 TABLET EVERY 8 HOURS MDD:3; Therapy: 58Doe4356 to 
(Evaluate:63Wld6994)  Requested for: 2020; Last Rx:94Qwf5353 Ordered 8. 
oxyCODONE-Acetaminophen  MG Oral Tablet; Take 1 tablets every 12 hours as 
needed for pain MDD:2; Therapy: 52Hfp6531 to (Evaluate:86Qce6550)  Requested 
for: 2020; Last Rx:71Ifr7154 Ordered 9. Potassium Gluconate 595 (99 K) MG 
Oral Tablet; Therapy: (Recorded:2016) to Recorded 10. Protonix 40 MG Oral 
Tablet Delayed Release (Pantoprazole Sodium);  Therapy: 2018 to Recorded 
11. Wellbutrin TABS (BuPROPion HCl);  Therapy: (Recorded:2018) to Recorded 
Allergies  Morphine Derivatives Itching; Updated By: Geno Zapata; 
2018 3:14:52 PMonly iv morphineDenied   Adhesive Tape Recorded By: 
Alesha Hollis; 2016 12:56:08 PM  Iodinated Contrast Media Recorded By: 
Alesha Hollis; 2016 12:56:08 PM  Latex Recorded By: Alesha Hollis; 
2016 12:56:08 PM NotesNYSW 30 Day RX Note: Chief complaint: Patient is here 
today for 30 day refill of their controlled substance prescription Patient is 
being treated with chronic opioid therapy for BACK PAIN.  Patient denies side 
effects related to chronic opioid use and has been re-educated regarding the 
controlled substance agreement. In my opinion patient continues to receive 
primary benefit with chronic opioid therapy. MORPHINE ER, OXYCODONE . CONTROLLED
SUBSTANCE INFORMATION: I advised the patient today/previously regarding 
treatment with the above controlled substance and/or narcotic. The patient was 
then informed of the risks, benefits, and alternatives of the narcotic. The 
risks discussed included but were not limited to physical and/or psychological 
dependence, tolerance of the medication, drowsiness, sleepiness, 
balance/coordination problems, confusion, allergic reaction or any other 
abnormal symptoms. The patient was advised and agreed to use the medication only
as prescribed, and not to drive, or operate heavy equipment or machinery. The 
patient understood and consented to both undergo a narcotic/opiate regimen and 
agrees to comply with all of the [facility] terms of a 
controlled substance treatment and agreement.  I am giving the patient a 30 day 
refill without changes. The patient consents to move forward with treatment. ...
OPIOID ABUSE is a national epidemic that Physicians and other prescribers have 
the power to help prevent. In our opioid monitoring surveillance to help 
minimize misuse and diversion, moderate to high risk patients are required to 
have face to face 30 day refill of opioids. This will help minimize the 
potential for electronic false requests, deterring potential fraudulent 
behavior. [facility]  Information:  was consulted by my designee and I have 
reviewed the information presented to me and find no aberrant compliance issues.
Patient has been informed.    Physical ExamGeneral: The patient is a well nour
ished/well developed, female, with a medium build and who is in no acute 
distress. Eyes: Lids are atraumatic, no lesions, sclerae are anicteric. 
currently wearing eyeglasses. Ears, Nose, Mouth, Throat: external ears and nose 
without trauma. Respiratory: Normal chest expansion and respiratory effort. 
Psychological: Alert and oriented to person, place and time. Mood and affect are
pleasant and appropriate. Judgement intact. Insight normal without delusions or 
hallucinations.  Denies suicidal/homicidal ideation.   [facility] 
Prior UDS Results Detail Form: Results are not in compliance. Drug Compliance 
notified   Assessment 1. Chronic low back pain (724.2,338.29) (M54.5,G89.29) 2. 
Facet arthropathy, lumbar (721.3) (M47.816) Plan 1. Renew: Morphine Sulfate ER 
15 MG Oral Tablet Extended Release; TAKE 1 TABLET EVERY 8 HOURS MDD:3LD-3/5/2020
11AM 2. Renew: oxyCODONE-Acetaminophen  MG Oral Tablet; Take 1 tablets 
every 12 hours as needed for pain MDD:2LD-3/5/2020  11AM [facility] Treatment Plan 
(2): Medication:. Due to the current COVID-19 pandemic, telemedicine does not 
allow for the collection of urine drug screening for initiation or chronic use 
of opioid medications. Patient will be subject to urine drug screening at next 
in-office visit. Medication list was reviewed with the patient, and updates were
made to reflect her current medication regimen.  Allergy list was reviewed with 
patient, and any necessary changes were made.    [facility]  Information:  was 
consulted by my designee and I have reviewed the information presented to me and
find no aberrant compliance issues. Patient has been informed.  Controlled 
Substance Prescribed: MORPHINE ER, OXYCODONE . CONTROLLED SUBSTANCE INFORMATION:
I advised the patient today/previously regarding treatment with the above 
controlled substance and/or narcotic. The patient was then informed of the 
risks, benefits, and alternatives of the narcotic. The risks discussed included 
but were not limited to physical and/or psychological dependence, tolerance of 
the medication, drowsiness, sleepiness, balance/coordination problems, 
confusion, allergic reaction or any other abnormal symptoms. The patient was 
advised and agreed to use the medication only as prescribed, and not to drive, 
or operate heavy equipment or machinery. The patient understood and consented to
both undergo a narcotic/opiate regimen and agreed to sign and comply with all of
the [facility] terms of a controlled substance 
treatment and agreement.  There are no changes in current medications at this 
visit. Patient instructed to continue current regimen.  The prescribed 
medications are medically necessary for pain management and rehabilitation. 
Treatment includes: FOLLOW UP: The patient already has a follow-up appointment. 
TIme:. Time: Start time: 3:17 P.M. End time: 3:20. P.M.        
Discussion/SummaryPatient was seen for a telehealth visit today through the use 
of live video calling.  Yusra is a 58 yo female with chronic low  back pain, seen
today for her medication refill visit. Stable on her current medication regimen.
No changes made, refills provided. She has a follow up next month that she will 
keep.   Signatures Electronically signed by : Ana Miller NP; 2020  
3:24PM EST                       (Author)  Electronically signed by : Fabricio Camacho MD; 2020  2:51PM EST                       









          Name      Value     Range     Interpretation Code Description Data Alla

rce(s) Supporting 

Document(s)

 

                                                                       









                    ID                  Date                Data Source

 

                    [id number]            2020 12:20:32 PM EST [facility]

[facility], PCName: Kandis kay TicosDOB: 1960Provider: Loulou Brown: 2020 Chief ComplaintPatient presents with neck and low back pain
 Chief Complaint 2N[place]W VAS PAIN Established:  MA completing section: DB CNA   
History of Present IllnessDo you have a Brace for your condition? Patient does 
not have a brace for their condition. Recent test/procedures: Patient was asked 
and denies having any tests since their last visit. Patient was asked and denies
being seen by any Physicians since their last visit. At today's visit patient 
presents with their Self Patient is retired. What was patient's previous 
occupation? . The patient is being seen for a follow-up.        
Pain Quality: (Neuropathic)  burning and numbness Pain Quality: (Nociceptive)  
aching, dull and sharp Timing:  constant Progression:  unchanged Palliation:  
block, changing position, opioid analgesics, rest and resting/laying down 
Exacerbating:  standing, walking and exercise Pain Score:  a current pain level 
of 7/10, a minimum pain level of 6/10 and a maximum pain level of 10/10. 
Condition type: The patient is being seen for a chronic condition. PAIN 
LOCATION:  the pain is located in the low back ,  (weakness , achy and throbbing
pain) radiates to the right buttock, left buttock, right hip, left hip, right 
thigh, right knee, right calf/shin, right ankle, right big toe and right pinky 
toe, the pain is greater on the right side more than the left and the pain is in
the leg equally with the back. The etiology of this injury/condition is unknown.
RELATIONSHIP TO INJURY: This condition is not related to a specific injury. 
INJURY MECHANISM: The injury resulted from  no known physical event. REVIEW OF 
PAST DIAGNOSTICS: have included:  MRI and electromyography/nerve conduction 
studies . Records were obtained, reviewed and on file. PAST TREATMENT has 
included:  NONSTEROIDAL ANTI-INFLAMMATORY drugs (not effective) Includes Mobic.,
but ANTICONVULSANTS (effective) Includes Lyrica., OPIOID ANALGESICS (effective) 
Includes. hydrocodone does not work. fentanyl patch seems to be working well., 
cervical surgery 2015 (effective). - Radio Frequency Pertinent Information:
On a RADIO FREQUENCY ABLATION of the BILATERAL, LUMBAR FACET JOINTS under 
fluoroscopy as confirmed by previous successful medial branch nerve blocks. 
Targeting the.  80 % of pain relief was provided which is ongoing. Allows 
patient to increase activity and functionality Including the following 
activities for longer periods of time with less pain: activities of daily 
living, better sleep, walking, standing, sitting and grocery shop. And to 
decrease the following pain medication(s): 19, 19 DR. RASHID. Good 
reduction of pain in the low back and able to walk better. INTERVAL EVENTS: 
include . She is doing ok for the most part taking care of her grand kids so 
this is keeping her busy. She states that she is too busy to worry about 
herself. The meds really do help. ASSOCIATED SYMPTOMS: include difficulty 
sleeping , difficulty walking, radiating, extremity weakness:  left, lower 
extremity, right. and urinary incontinence, but no fecal incontinence. 
FUNCTIONAL LIMITATIONS: The patient's functional status is limited as follows: 
ability to  perform activities of daily living, participate in aerobic activity,
participate in hobbies, perform housework and maintain normal sleep pattern. 
MEDICATION SIDE EFFECTS experienced by patient are:  drowsiness.   Review of 
SystemsROS was reviewed with patient; documented on established patient 
questionnaire dated 3-5-2020. I feel the ROS to be negative/normal other than  
neck back joint pain.    Patient maintains at today's visit there has been no 
change in his/her hematologic history.     Active Problems 1. Chronic hip pain, 
left (719.45,338.29) (M25.552,G89.29) 2. Chronic low back pain (724.2,338.29) 
(M54.5,G89.29) 3. Facet arthropathy, lumbar (721.3) (M47.816) 4. History of 
depression (V11.8) (Z86.59) 5. History of fibromyalgia (V13.59) (Z87.39) 6. 
History of osteoarthritis (V13.4) (Z87.39) 7. Long term current use of opiate 
analgesic (V58.69) (Z79.891) 8. Lumbar radiculopathy (724.4) (M54.16) 9. Lumbar 
spondylosis (721.3) (M47.816) 10. Osteoarthritis of hip (715.95) (M16.9) 11. 
Primary osteoarthritis of hip (715.15) (M16.10) 12. Spondylolisthesis, lumbar 
region (738.4) (M43.16) Allergies  Morphine Derivatives Itching; Updated By: 
Geno Zapata; 2018 3:14:52 PMonly iv morphineDenied   Adhesive Tape 
Recorded By: Alesha Hollis; 2016 12:56:08 PM  Iodinated Contrast Media 
Recorded By: Alesha Hollis; 2016 12:56:08 PM  Latex Recorded By: Alesha Hollis; 2016 12:56:08 PM Current Meds  Aleve-D Sinus  Cold TB12;Therapy: 
(Recorded:2017) to Recordedld 10/9/17 am  Aspirin  MG Oral Tablet 
Delayed Release; take one pill po every day;Therapy: 47Nmc7114 to 
(Evaluate:2018) Recordedself prescribed  Lexapro 20 MG Oral Tablet;Therapy:
(Recorded:92Rsl6895) to Recorded  Magnesium Oxide 400 MG Oral Capsule;Therapy: 
44Dys6537 to Recorded  Metrogel GEL;Therapy: (Recorded:80Fob7338) to Recorded  
Minocycline HCl CAPS;Therapy: (Recorded:40Aad0907) to Recorded  Morphine Sulfate
ER 15 MG Oral Tablet Extended Release; TAKE 1 TABLET EVERY 8HOURS MDD:3;Therapy:
34Nkk2462 to (Evaluate:2020)  Requested for: 08Yut7940; LastRx:80Exn6280 
OrderedLD-3/5/2020 11AM  oxyCODONE-Acetaminophen  MG Oral Tablet; Take 1 
tablets every 12 hours asneeded for pain MDD:2;Therapy: 03Lwr7265 to 
(Evaluate:2020)  Requested for: 62Tjx6633; LastRx:36Lhv1523 OrderedLD-
3/5/2020  11AM  Potassium Gluconate 595 (99 K) MG Oral Tablet;Therapy: 
(Recorded:2016) to Recorded  Protonix 40 MG Oral Tablet Delayed 
Release;Therapy: 2018 to Recorded  Wellbutrin TABS;Therapy: 
(Recorded:2018) to Recorded Past Medical History  History of Chronic 
headaches (784.0) (R51)  Denied: History of blood coagulation disorder  History 
of degenerative disc disease (V13.59) (Z87.39)  History of depression (V11.8) 
(Z86.59)  History of fibromyalgia (V13.59) (Z87.39)  Assessed By: Arabella Brown 
(Pain Management); Last Assessed: 2017  History of hepatitis A virus 
infection (V12.09) (Z86.19)  History of kidney stones (V13.01) (Z87.442)  
History of osteoarthritis (V13.4) (Z87.39)  Assessed By: Arabella Brown (Pain 
Management); Last Assessed: 2017  History of small bowel obstruction 
(V12.79) (Z87.19)    History of Not currently pregnant (V49.89) (Z78.9)  
Denied: History of On anticoagulant therapy Surgical History  History of 
Appendectomy  3/2013  History of Back Surgery  2015    C5 disk replacement  
History of Gastric Surgery For Morbid Obesity  2006  History of Hernia Repair  
2013  History of Hip Replacement Left  History of Hysterectomy  3/2004  
Denied: History of Implantable Cardioverter-Defibrillator  History of Knee 
Arthroplasty  History of Knee Replacement  2005  History of Knee Surgery  
2015  Denied: History of Pacemaker Placement  History of Total Hip Replacement
  Family History  Family history of arthritis (V17.7) (Z82.61)  Family 
history of cardiac disorder (V17.49) (Z82.49)  Family history of cardiac 
disorder (V17.49) (Z82.49)  Family history of diabetes mellitus (V18.0) (Z83.3) 
Family history of malignant neoplasm (V16.9) (Z80.9) Social History  Current 
non-drinker of alcohol (V49.89) (Z78.9)  Denied: History of Drug use    
Never a smoker  Not currently employed VitalsVital Signs Recorded: 2020 
04:01PM Height: 5 ft 6 inWeight: 204 lb BMI Calculated: 32.93BSA Calculated: 2.0
2Systolic: 116, SittingDiastolic: 74, SittingHeart Rate: 82Respiration: 16Height
measured w/wo shoes: w/shoesPain Scale: 7 Physical ExamGeneral: The patient is a
well nourished/well developed, female, with a medium build, who is in no acute 
distress and appears stated age. Eyes: Lids are atraumatic, no lesions, sclerae 
are anicteric. Ears, Nose, Mouth, Throat: external ears and nose without trauma.
Gait and Station: Gait was normal. Lumbosacral Spine: - Inspection: normal 
appearance. No deformity, ecchymosis, erythema or swelling noted. Normal 
Lordosis.. - Palpation/Tenderness: Tenderness on palpation of the LEFT: 
paraspinal , sciatic notch and sacroiliac joint. - Palpation/Tenderness: 
Tenderness on palpation of the RIGHT: paraspinal , sciatic notch and sacroiliac 
joint. - ROM Flexion: was painful. - ROM Extension: was painful.Right Lower 
Extremity Motor:- Hip: 5/5 flexion, 5/5 extension- Knee: 5/5 flexion, 5/5 
extension- Foot: 5/5 Plantar , 5/5 DorsiFlexionSpecial Tests: flip test was 
negative, positive facet challenge  and positive faberLeft Lower Extremity 
Motor:- Hip: 5/5 flexion, 5/5 extension- Knee: 5/5 flexion, 5/5 extension- Foot:
5/5 Plantar , 5/5 DorsiFlexionSpecial Tests: flip test was negative, positive 
facet challenge  and positive faberNeurological:. Skin: Warm, dry, acyanotic. 
Psychological: Alert and oriented to person, place and time. Mood and affect are
pleasant and appropriate. Judgement intact. Insight normal without delusions or 
hallucinations.  Denies suicidal/homicidal ideation.   Assessment 1. Chronic low
back pain (724.2,338.29) (M54.5,G89.29) 2. Lumbar radiculopathy (724.4) (M54.16)
3. Lumbar spondylosis (721.3) (M47.816) 4. Spondylolisthesis, lumbar region 
(738.4) (M43.16) Plan 1. Renew: Morphine Sulfate ER 15 MG Oral Tablet Extended 
Release; TAKE 1 TABLET EVERY 8 HOURS MDD:3LD-3/5/2020 11AM 2. Renew: oxyCODONE-
Acetaminophen  MG Oral Tablet; Take 1 tablets every 12 hours as needed for
pain MDD:2LD-3/5/2020  11AM 3. 30 Day RX Management  Follow-up  Status: Complete
 Done: 2020). Schedule (FUP) 60 days from today: : 2020). Is an 
additional appointment needed....? : Yes). Schedule 30 day RX from TODAY's date 
(2 days +/- either way): : 2020 In my opinion based on subjective and 
objective findings from today's visit the patient is minimally changed. 
Medication:.  [place]  Information:  was consulted by my designee and I have 
reviewed the information presented to me and find no aberrant compliance issues.
Patient has been informed.  Controlled Substance Prescribed: MORPHINE ER, 
PERCOCET. The patient is on the lowest possible dose of opioids. The patient 
denies adverse side effects and does not demonstrate any aberrant drug taking 
behaviors. The patient is able to perform ADL's  including the following 
activities for longer periods of time with less pain: activities of daily 
living, sleeping, walking, standing, sitting, grocery shopping . There are no 
changes in current medications at this visit. Patient instructed to continue 
current regimen.  The prescribed medications are medically necessary for pain 
management and rehabilitation. Treatment includes: PROCEDURE(S): the patient 
defers blocks/procedures at this time THERAPIES: Therapy Treatment Plan: 
Deferred: All Therapies: Deferred: per patient request. FOLLOW UP: The patient 
should have a follow up 30 Day RX. CONTINUE TREATMENT: Yusra will continue with 
the following:. Yusra is participating in a home exercise program and is 
encouraged to continue. - : The patient was counseled on the following:  
treatment plan and future treatment options.        Discussion/SummaryYusra is 
seen for her low back and leg pain. She is stable on current regime. She is 
aware of treatment options including the possibility of MILD procedure.. AT this
time she is caring for her grand kids . She will be seen in a month. She may try
CBD to see if this might help.   Signatures Electronically signed by : Arabella Brwon NP; Mar  5 2020  5:18PM EST                       (Author)  
Electronically signed by : Sonja Jones MD; Mar  6 2020 12:20PM EST              
        









          Name      Value     Range     Interpretation Code Description Data Alla

rce(s) Supporting 

Document(s)

 

                                                                       









                    ID                  Date                Data Source

 

                    [id number]            2020 08:52:56 PM EST [place] [facility], PCName: Kandis DoshisDOB: 1960Provider: CheoNan: 2020 Chief Complaint 2 MA completing section: dburtch   
History of Present IllnessA Urine Drug Screen was ordered for Yusra Dietz and 
collected on site today 2020. Creatinine has been ordered as well for 
specimen validity, not for kidney function. Preliminary UDS results are not 
final and should not be used to determine patient care or plan of treatment. 
Initially a qualitative immunoassay screen will be done. Any positive findings 
or negative findings that are out of compliance will be further tested with a 
more comprehensive quantitative confirmation LCMS study. It is part of the 
normal prescribing protocol of controlled substances and is considered standard 
of care.   The patient is being seen today for refill of prescriptions for 
controlled substances.      The date of onset of symptoms is approximately 25 
years.   Current Meds 1. Aleve-D Sinus  Cold TB12; Therapy: (Recorded:33Oqn8458)
to Recorded 2. Aspirin  MG Oral Tablet Delayed Release; take one pill po 
every day; Therapy: 15Szx8165 to (Evaluate:2018) Recorded 3. Lexapro 20 MG 
Oral Tablet; Therapy: (Recorded:48Bqi0479) to Recorded 4. Magnesium Oxide 400 MG
Oral Capsule; Therapy: 72Fsx0194 to Recorded 5. Metrogel GEL; Therapy: 
(Recorded:39Kvd1426) to Recorded 6. Minocycline HCl CAPS; Therapy: 
(Recorded:19Ecp6022) to Recorded 7. Morphine Sulfate ER 15 MG Oral Tablet 
Extended Release; TAKE 1 TABLET EVERY 8 HOURS MDD:3; Therapy: 60Fgs0645 to 
(Evaluate:43Adv3286)  Requested for: 2020; Last Rx:2020 Ordered 8. 
oxyCODONE-Acetaminophen  MG Oral Tablet; Take 1 tablets every 12 hours as 
needed for pain MDD:2; Therapy: 52Nye9338 to (Evaluate:77Eht3361)  Requested 
for: 2020; Last Rx:2020 Ordered 9. Potassium Gluconate 595 (99 K) MG 
Oral Tablet; Therapy: (Recorded:2016) to Recorded 10. Protonix 40 MG Oral 
Tablet Delayed Release;  Therapy: 2018 to Recorded 11. Wellbutrin TABS;  
Therapy: (Recorded:2018) to Recorded Allergies  Morphine Derivatives 
Itching; Updated By: Geno Zapata; 2018 3:14:52 PMonly iv 
morphineDenied   Adhesive Tape Recorded By: Alesha Hollis; 2016 12:56:08 
PM  Iodinated Contrast Media Recorded By: Alesha Hollis; 2016 12:56:08 PM 
Latex Recorded By: Alesah Hollis; 2016 12:56:08 PM NotesNYSW 30 Day RX 
Note: Chief complaint: Patient is here today for 30 day refill of their 
controlled substance prescription Patient is being treated with chronic opioid 
therapy for CHRONIC LOW BACK PAIN.  Patient denies side effects related to 
chronic opioid use and has been re-educated regarding the controlled substance 
agreement. In my opinion patient continues to receive primary benefit with 
chronic opioid therapy. MORPHINE, OXYCODONE . CONTROLLED SUBSTANCE INFORMATION: 
I advised the patient today/previously regarding treatment with the above 
controlled substance and/or narcotic. The patient was then informed of the risk
s, benefits, and alternatives of the narcotic. The risks discussed included but 
were not limited to physical and/or psychological dependence, tolerance of the 
medication, drowsiness, sleepiness, balance/coordination problems, confusion, 
allergic reaction or any other abnormal symptoms. The patient was advised and 
agreed to use the medication only as prescribed, and not to drive, or operate 
heavy equipment or machinery. The patient understood and consented to both 
undergo a narcotic/opiate regimen and agrees to comply with all of the [place] 
[facility] terms of a controlled substance treatment and agreement.  I 
am giving the patient a 30 day refill without changes. The patient consents to 
move forward with treatment. ...OPIOID ABUSE is a national epidemic that 
Physicians and other prescribers have the power to help prevent. In our opioid 
monitoring surveillance to help minimize misuse and diversion, moderate to high 
risk patients are required to have face to face 30 day refill of opioids. This 
will help minimize the potential for electronic false requests, deterring 
potential fraudulent behavior. [facility]  Information:  was consulted by my 
designee and I have reviewed the information presented to me and find no 
aberrant compliance issues. Patient has been informed.    Physical ExamGeneral: 
The patient is a well nourished/well developed, female, who is obese, who is in 
no acute distress and appears stated age. Eyes: Lids are atraumatic, no lesions,
sclerae are anicteric. Ears, Nose, Mouth, Throat: external ears and nose without
trauma. Gait and Station: Gait was normal. Respiratory: Normal chest expansion 
and respiratory effort. Skin: Warm, dry, acyanotic. Psychological: Alert and 
oriented to person, place and time. Mood and affect are pleasant and 
appropriate. Judgement intact. Insight normal without delusions or 
hallucinations.  Denies suicidal/homicidal ideation.   Assessment 1. Chronic low
back pain (724.2,338.29) (M54.5,G89.29) 2. Lumbar radiculopathy (724.4) (M54.16)
Plan 1. Renew: Morphine Sulfate ER 15 MG Oral Tablet Extended Release; TAKE 1 
TABLET EVERY 8 HOURS MDD:3LD 2020 2. Renew: oxyCODONE-Acetaminophen  
MG Oral Tablet; Take 1 tablets every 12 hours as needed for pain MDD:2LD 
2020 3. UDS-LAB Medicare / Commercial ([facility] Urine Drug SCreen); [Do Not 
Release]; Specimen Source:Urine; Status:In Progress - Specimen/Data Collected;  
Done: 98Mha2159 [facility] Treatment Plan (2): In my opinion based on subjective and 
objective findings from today's visit the patient is minimally changed. 
Medication:. There are no changes in current medications at this visit. Patient 
instructed to continue current regimen.  UDS: This is an established patient and
is on ongoing opioid therapy. A UDS is being obtained today, the patient has 
previously consented to UDS as part of the Controlled Substance and Treatment 
Agreement. The reason for today's UDS is: patient was selected at random and 
scored as moderate risk on the opioid risk assessment. Creatinine has been 
ordered as well for specimen validity, not for kidney function. Preliminary UDS 
results are not final and should not be used to determine patient care or plan 
of treatment. Initially a qualitative immunoassay screen will be done. Any 
positive findings or negative findings that are out of compliance will be 
further tested with a more comprehensive quantitative confirmation LCMS study. 
It is part of the normal prescribing protocol of controlled substances and is 
considered standard of care.  Treatment includes: FOLLOW UP: The patient should 
have a follow up visit in 1 month.        Signatures Electronically signed by : 
Dave Navarro NP; 2020  3:38PM EST                       (Author)  
Electronically signed by : Sourav Shepherd MD; 2020  8:52PM EST               
       









          Name      Value     Range     Interpretation Code Description Data Alla

rce(s) Supporting 

Document(s)

 

                                                                       









                    ID                  Date                Data Source

 

                    [id number]            2020 04:27:55 PM EST [place] [facility]

 

                                        [place] [facility], PCName: Kandis DoshisDOB: 1960Provider: Loulou Brown: 2020 Chief ComplaintPatient presents with back, neck and limb 
pain  Chief Complaint 2[place] VAS PAIN Established:  MA completing section: 
AWellenzohn LPN   History of Present IllnessDo you have a Brace for your 
condition? Patient does not have a brace for their condition. Recent 
test/procedures: Patient was asked and denies having any tests since their last 
visit. Patient was asked and denies being seen by any Physicians since their 
last visit. At today's visit patient presents with their Self Patient is not 
currently working. 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 The patient is
being seen for a follow-up.       Pain Duration:    Pain Quality: 
(Neuropathic)  burning and numbness Pain Quality: (Nociceptive)  aching and 
sharp Timing:  constant Progression:  unchanged Palliation:  block, heat, opioid
analgesics and rest Exacerbating:  standing, walking and weather Pain Score:  a 
current pain level of 6/10 and a maximum pain level of 10/10. Condition type: 
The patient is being seen for a chronic condition. PAIN LOCATION:  the pain is 
located in the low back ,  (weakness , achy and throbbing pain) radiates to the 
right buttock, left buttock, right hip, left hip, right thigh, right knee, right
calf/shin, right ankle, right big toe and right pinky toe, the pain is greater 
on the right side more than the left and the pain is in the leg equally with the
back. The etiology of this injury/condition is unknown. RELATIONSHIP TO INJURY: 
This condition is not related to a specific injury. INJURY MECHANISM: The injury
resulted from  no known physical event. REVIEW OF PAST DIAGNOSTICS: have 
included:  MRI and electromyography/nerve conduction studies . Records were 
obtained, reviewed and on file. PAST TREATMENT has included:  NONSTEROIDAL ANTI-
INFLAMMATORY drugs (not effective) Includes Mobic., but ANTICONVULSANTS 
(effective) Includes Lyrica., OPIOID ANALGESICS (effective) Includes. 
hydrocodone does not work. fentanyl patch seems to be working well., cervical 
surgery 2015 (effective). - Radio Frequency Pertinent Information: On a 
RADIO FREQUENCY ABLATION of the BILATERAL, LUMBAR FACET JOINTS under fluoroscopy
as confirmed by previous successful medial branch nerve blocks.  80 % of pain 
relief was provided which is ongoing. Allows patient to increase activity and 
functionality including the following activities for longer periods of time with
less pain: activities of daily living, better sleep, walking, standing, sitting,
grocery shop . 19, 19 DR. RASHID., good reduction of pain in the low 
back and able to walk better. INTERVAL EVENTS: include . She is stable on 
current regime. The pain is well managed with the meds...she is caring for her 
grand kids so this is keeping her busy. She does not feel like she needs a block
at this time. ASSOCIATED SYMPTOMS: include difficulty sleeping , difficulty 
walking, radiating, extremity weakness:  left, lower extremity, right. and 
urinary incontinence, but no fecal incontinence. FUNCTIONAL LIMITATIONS: The 
patient's functional status is limited as follows: ability to  perform 
activities of daily living, participate in aerobic activity, participate in 
hobbies, perform housework and maintain normal sleep pattern. MEDICATION SIDE 
EFFECTS experienced by patient are:  drowsiness.   Review of SystemsROS was 
reviewed with patient; documented on established patient questionnaire dated 
20. I feel the ROS to be negative/normal other than  neck, back and joint 
pain.    Patient maintains at today's visit there has been no change in his/her 
hematologic history.     Active Problems 1. Chronic hip pain, left 
(719.45,338.29) (M25.552,G89.29) 2. Chronic low back pain (724.2,338.29) 
(M54.5,G89.29) 3. Facet arthropathy, lumbar (721.3) (M47.816) 4. History of 
depression (V11.8) (Z86.59) 5. History of fibromyalgia (V13.59) (Z87.39) 6. 
History of osteoarthritis (V13.4) (Z87.39) 7. Long term current use of opiate 
analgesic (V58.69) (Z79.891) 8. Lumbar radiculopathy (724.4) (M54.16) 9. Lumbar 
spondylosis (721.3) (M47.816) 10. Osteoarthritis of hip (715.95) (M16.9) 11. 
Primary osteoarthritis of hip (715.15) (M16.10) 12. Spondylolisthesis, lumbar re
gion (738.4) (M43.16) Allergies  Morphine Derivatives Itching; Updated By: 
Geno Zapata; 2018 3:14:52 PMonly iv morphineDenied   Adhesive Tape 
Recorded By: Alesha Hollis; 2016 12:56:08 PM  Iodinated Contrast Media 
Recorded By: Alesha Hollis; 2016 12:56:08 PM  Latex Recorded By: Alesha Hollis; 2016 12:56:08 PM Current Meds  Aleve-D Sinus  Cold TB12;Therapy: 
(Recorded:2017) to Recordedld 10/9/17 am  Aspirin  MG Oral Tablet 
Delayed Release; take one pill po every day;Therapy: 04Cny7469 to 
(Evaluate:42Nji6215) Recordedself prescribed  Lexapro 20 MG Oral Tablet;Therapy:
(Recorded:13Una9037) to Recorded  Magnesium Oxide 400 MG Oral Capsule;Therapy: 
71Mcr8044 to Recorded  Metrogel GEL;Therapy: (Recorded:97Jma6651) to Recorded  
Minocycline HCl CAPS;Therapy: (Recorded:27Awr4044) to Recorded  Morphine Sulfate
ER 15 MG Oral Tablet Extended Release; TAKE 1 TABLET EVERY 8HOURS MDD:3;Therapy:
74Gqo4291 to (Evaluate:2020)  Requested for: 11Obo6921; LastRx:56Lrc8078 
OrderedLD 11:30am 20  oxyCODONE-Acetaminophen  MG Oral Tablet; Take 1 
tablets every 12 hours asneeded for pain MDD:2;Therapy: 87Mfj9294 to 
(Evaluate:2020)  Requested for: 88Hyc2063; LastRx:24Cgv2640 OrderedLD 
11:30am 20  Potassium Gluconate 595 (99 K) MG Oral Tablet;Therapy: 
(Recorded:2016) to Recorded  Protonix 40 MG Oral Tablet Delayed 
Release;Therapy: 2018 to Recorded  Wellbutrin TABS;Therapy: 
(Recorded:2018) to Recorded Past Medical History  History of Chronic 
headaches (784.0) (R51)  Denied: History of blood coagulation disorder  History 
of degenerative disc disease (V13.59) (Z87.39)  History of depression (V11.8) 
(Z86.59)  History of fibromyalgia (V13.59) (Z87.39)  Assessed By: Arabella Brown 
(Pain Management); Last Assessed: 2017  History of hepatitis A virus 
infection (V12.09) (Z86.19)  History of kidney stones (V13.01) (Z87.442)  
History of osteoarthritis (V13.4) (Z87.39)  Assessed By: Arabella Brown (Pain 
Management); Last Assessed: 2017  History of small bowel obstruction 
(V12.79) (Z87.19)    History of Not currently pregnant (V49.89) (Z78.9)  
Denied: History of On anticoagulant therapy Surgical History  History of 
Appendectomy  3/2013  History of Back Surgery  2015    C5 disk replacement  
History of Gastric Surgery For Morbid Obesity    History of Hernia Repair  
2013  History of Hip Replacement Left  History of Hysterectomy  3/2004  
Denied: History of Implantable Cardioverter-Defibrillator  History of Knee 
Arthroplasty  History of Knee Replacement  2005  History of Knee Surgery  
2015  Denied: History of Pacemaker Placement  History of Total Hip Replacement
 2002 Family History  Family history of arthritis (V17.7) (Z82.61)  Family 
history of cardiac disorder (V17.49) (Z82.49)  Family history of cardiac 
disorder (V17.49) (Z82.49)  Family history of diabetes mellitus (V18.0) (Z83.3) 
Family history of malignant neoplasm (V16.9) (Z80.9) Social History  Current 
non-drinker of alcohol (V49.89) (Z78.9)  Denied: History of Drug use    
Never a smoker  Not currently employed VitalsVital Signs Recorded: 2020 
03:36PM Height: 5 ft 6.5 inWeight: 209 lb BMI Calculated: 33.23BSA Calculated: 
2.05Systolic: 120, SittingDiastolic: 80, SittingHeart Rate: 72Respiration: 
16Height measured w/wo shoes: w/o shoesPain Scale: 6 Physical ExamGeneral: The 
patient is a well nourished/well developed, female, who is in no acute distress 
and appears stated age. Eyes: Lids are atraumatic, no lesions, sclerae are 
anicteric. Ears, Nose, Mouth, Throat: external ears and nose without trauma. 
Gait and Station: Gait was normal. Psychological: Alert and oriented to person, 
place and time. Mood and affect are pleasant and appropriate. Judgement intact. 
Insight normal without delusions or hallucinations.  Denies suicidal/homicidal 
ideation.   Assessment 1. Chronic low back pain (724.2,338.29) (M54.5,G89.29) 2.
Facet arthropathy, lumbar (721.3) (M47.816) 3. Lumbar radiculopathy (724.4) 
(M54.16) 4. Chronic hip pain, left (719.45,338.29) (M25.552,G89.29) 5. Lumbar 
spondylosis (721.3) (M47.816) Plan 1. Renew: Morphine Sulfate ER 15 MG Oral 
Tablet Extended Release; TAKE 1 TABLET EVERY 8 HOURS MDD:3LD 11:30am 20 2. 
Renew: oxyCODONE-Acetaminophen  MG Oral Tablet; Take 1 tablets every 12 
hours as needed for pain MDD:2LD 11:30am 20 In my opinion based on 
subjective and objective findings from today's visit the patient is minimally 
changed. Medication:.  [place]  Information:  was consulted by my designee and
I have reviewed the information presented to me and find no aberrant compliance 
issues. Patient has been informed.  Controlled Substance Prescribed: MORPHINE 
ER, PERCOCET. The patient is on the lowest possible dose of opioids. The patient
denies adverse side effects and does not demonstrate any aberrant drug taking 
behaviors. The patient is able to perform ADL's  including the following 
activities for longer periods of time with less pain: activities of daily 
living, sleeping, walking, standing, sitting, grocery shopping . There are no 
changes in current medications at this visit. Patient instructed to continue 
current regimen.  The prescribed medications are medically necessary for pain 
management and rehabilitation. Treatment includes: PROCEDURE(S): the patient 
defers blocks/procedures at this time THERAPIES: Therapy Treatment Plan: 
Deferred: All Therapies: Deferred: per patient request. FOLLOW UP: The patient 
should have a follow up 30 Day RX. CONTINUE TREATMENT: Yusra will continue with 
the following:. Yusra is participating in a home exercise program and is 
encouraged to continue. - : The patient was counseled on the following:  
treatment plan and future treatment options.        Discussion/SummaryYusra is 
seen for her low back and neck pain. She is reporting that for the most part she
is ok but that the meds do help. She is not yet ready for a block. She also is 
potentially a MILD candidate at some point in time she may want to see Dr. Rashid 
in the office to discuss any other options available to her. She will be seen in
a month.   Signatures Electronically signed by : Arabella Brown NP; 2020  
4:27PM EST                       (Author)  Electronically signed by : Laura Castro MD; 2020  4:27PM EST                       









          Name      Value     Range     Interpretation Code Description Data Alla

rce(s) Supporting 

Document(s)

 

                                                                       









                    ID                  Date                Data Source

 

                    [id number]            2019 08:31:05 AM EST [place] [facility], PCName: Kandis kay ChildsDOB: 1960Provider: 

RamonHoracioQuinten: 2019 Chief Complaint 2MA completing section: DAIANA Domínguez   History of Present IllnessThe patient is being seen today for refill of 
prescriptions for controlled substances.         Current Meds 1. Aleve-D Sinus  
Cold TB12; Therapy: (Recorded:2017) to Recorded 2. Aspirin  MG Oral 
Tablet Delayed Release; take one pill po every day; Therapy: [id number] to 
(Evaluate:2018) Recorded 3. Lexapro 20 MG Oral Tablet; Therapy: 
(Recorded:[id number]) to Recorded 4. Magnesium Oxide 400 MG Oral Capsule; 
Therapy: [id number] to Recorded 5. Metrogel GEL; Therapy: (Recorded:[id number]) to
Recorded 6. Minocycline HCl CAPS; Therapy: (Recorded:[id number]) to Recorded 7. 
Morphine Sulfate ER 15 MG Oral Tablet Extended Release; TAKE 1 TABLET EVERY 8 
HOURS MDD:3; Therapy: [id number] to (Evaluate:82Hha9989)  Requested for: 
2019; Last Rx:2019 Ordered 8. oxyCODONE-Acetaminophen  MG Oral 
Tablet; Take 1 tablets every 12 hours as needed for pain MDD:2; Therapy: 
[id number] to (Evaluate:66Sbt4805)  Requested for: 2019; Last Rx:2019 
Ordered 9. Potassium Gluconate 595 (99 K) MG Oral Tablet; Therapy: 
(Recorded:2016) to Recorded 10. Protonix 40 MG Oral Tablet Delayed Release;
 Therapy: 2018 to Recorded 11. Wellbutrin TABS;  Therapy: 
(Recorded:2018) to Recorded Allergies  Morphine Derivatives Itching; 
Updated By: Geno Zapata; 2018 3:14:52 PMonly iv morphineDenied   
Adhesive Tape Recorded By: Alesha Hollis; 2016 12:56:08 PM  Iodinated 
Contrast Media Recorded By: Alesha Hollis; 2016 12:56:08 PM  Latex 
Recorded By: Alesha Hollis; 2016 12:56:08 PM NotesNYSW 30 Day RX Note: 
Chief complaint: Patient is here today for 30 day refill of their controlled 
substance prescription Patient is being treated with chronic opioid therapy for 
CHRONIC LOW BACK PAIN.  Patient denies side effects related to chronic opioid 
use and has been re-educated regarding the controlled substance agreement. In my
opinion patient continues to receive primary benefit with chronic opioid 
therapy. MORPHINE, OXYCODONE . CONTROLLED SUBSTANCE INFORMATION: I advised the 
patient today/previously regarding treatment with the above controlled substance
and/or narcotic. The patient was then informed of the risks, benefits, and 
alternatives of the narcotic. The risks discussed included but were not limited 
to physical and/or psychological dependence, tolerance of the medication, 
drowsiness, sleepiness, balance/coordination problems, confusion, allergic 
reaction or any other abnormal symptoms. The patient was advised and agreed to 
use the medication only as prescribed, and not to drive, or operate heavy 
equipment or machinery. The patient understood and consented to both undergo a 
narcotic/opiate regimen and agrees to comply with all of the [place] [facility] terms of a controlled substance treatment and agreement.  I am giving 
the patient a 30 day refill without changes. The patient consents to move 
forward with treatment. ...OPIOID ABUSE is a national epidemic that Physicians 
and other prescribers have the power to help prevent. In our opioid monitoring 
surveillance to help minimize misuse and diversion, moderate to high risk 
patients are required to have face to face 30 day refill of opioids. This will 
help minimize the potential for electronic false requests, deterring potential 
fraudulent behavior. [facility]  Information:  was consulted by my designee and I
have reviewed the information presented to me and find no aberrant compliance 
issues. Patient has been informed.    Physical ExamGeneral: The patient is a 
well nourished/well developed, female, who is obese, who is in no acute distress
and appears stated age. Eyes: Lids are atraumatic, no lesions, sclerae are 
anicteric. Ears, Nose, Mouth, Throat: external ears and nose without trauma. 
Gait and Station: Gait was normal. Respiratory: Normal chest expansion and 
respiratory effort. Skin: Warm, dry, acyanotic. Psychological: Alert and 
oriented to person, place and time. Mood and affect are pleasant and 
appropriate. Judgement intact. Insight normal without delusions or 
hallucinations.  Denies suicidal/homicidal ideation.   Assessment 1. Chronic low
back pain (724.2,338.29) (M54.5,G89.29) 2. Long term current use of opiate 
analgesic (V58.69) (Z79.891) Plan 1. Renew: Morphine Sulfate ER 15 MG Oral 
Tablet Extended Release; TAKE 1 TABLET EVERY 8 HOURS MDD:3LD-124/19-2:00PM 2. 
Renew: oxyCODONE-Acetaminophen  MG Oral Tablet; Take 1 tablets every 12 
hours as needed for pain MDD:2LD-19-2:00PM [facility] Treatment Plan (2): Treatm
ent includes: FOLLOW UP: The patient should have a follow up visit in 1 month.  
     Signatures Electronically signed by : FE Lr; Dec  4 2019  
7:08PM EST                       (Author)  Electronically signed by : Cristian Stovall MD; Dec 11 2019  8:31AM EST                       









          Name      Value     Range     Interpretation Code Description Data Alla

rce(s) Supporting 

Document(s)

 

                                                                       







                                        Procedure

 

                                          



                                                                                
                                                                                
                                                                                
                                                                           



Social History

          



           Code       Duration   Value      Status     Description Data Source(s

)

 

             Smoking      2020 12:00:00 AM EDT Patient has never smoked co

mpleted    Patient 

has never smoked                        [facility]



                                                                                
                 



Vital Signs

          



                    ID                  Date                Data Source

 

                    UNK                                      









           Name       Value      Range      Interpretation Code Description Data

 Source(s)

 

           Body mass index (BMI) [Ratio] 33.9 kg/m2                       33.9 k

g/m2 [facility]

 

           Ideal body weight 130 [lb_av]                       130 [lb_av] [facility]

[facility]

 

           Oxygen saturation in Arterial blood by Pulse oximetry 99 %           

                  99 %       [facility] 

[facility]

 

           Body weight 215.00 [lb_av]                       215.00 [lb_av] [facility]

[facility]

 

           Body height 66.75 [in_i]                       66.75 [in_i] [facility]

 

                                        5'6.75" 

 

           Respiratory rate 18 /min                          18 /min    [facility]

[facility]

 

           Body temperature 97.3 [degF]                       97.3 [degF] [facility]

 [facility]

 

           Heart rate 85 /min                          85 /min    [facility]

 

           Diastolic blood pressure 73 mm[Hg]                        73 mm[Hg]  

[facility]

 

           Systolic blood pressure 108 mm[Hg]                       108 mm[Hg] M

[facility]

 

           Body mass index (BMI) [Ratio] 33.0 kg/m2                       33.0 k

g/m2 [facility]

 

           Ideal body weight 130 [lb_av]                       130 [lb_av] [facility]

[facility]

 

           Oxygen saturation in Arterial blood by Pulse oximetry 98 %           

                  98 %       [facility] 

[facility]

 

           Body weight 209.38 [lb_av]                       209.38 [lb_av] [facility]

[facility]

 

           Body height 66.75 [in_i]                       66.75 [in_i] [facility]

 

                                        5'6.75" 

 

           Respiratory rate 16 /min                          16 /min    [facility]

[facility]

 

           Body temperature 97.5 [degF]                       97.5 [degF] [facility]

 [facility]

 

           Heart rate 82 /min                          82 /min    [facility]

 

           Diastolic blood pressure 60 mm[Hg]                        60 mm[Hg]  

[facility]

 

           Systolic blood pressure 127 mm[Hg]                       127 mm[Hg] M

[facility]

 

           Body mass index (BMI) [Ratio] 33.3 kg/m2                       33.3 k

g/m2 [facility]

 

           Oxygen saturation in Arterial blood by Pulse oximetry 98 %           

                  98 %       [facility] 

[facility]

 

           Body weight 211.25 [lb_av]                       211.25 [lb_av] [facility]

[facility]

 

           Body height 66.75 [in_i]                       66.75 [in_i] [facility]

 

                                        5'6.75" 

 

           Respiratory rate 16 /min                          16 /min    [facility]

[facility]

 

           Body temperature 98.0 [degF]                       98.0 [degF] [facility]

 [facility]

 

           Heart rate 77 /min                          77 /min    [facility]

 

           Diastolic blood pressure 65 mm[Hg]                        65 mm[Hg]  

[facility]

 

           Systolic blood pressure 126 mm[Hg]                       126 mm[Hg] M

[facility]

## 2021-01-20 NOTE — CCD
Continuity of Care Document (CCD)

                             Created on: 2021



J LuisAnitha vegas

External Reference #: [id number]

: 1960

Sex: Female



Demographics





                          Address                   [address] 

[place]  [address]

 

                          Home Phone                [phone]

 

                          Preferred Language        Unknown

 

                          Marital Status            Unknown

 

                          L[employer] day Affiliation     [employer]

 

                          Race                      White

 

                          Ethnic Group              Not  or 





Author





                          Author                    Anitha KENT

 

                          Organization              Unknown

 

                          Address                   [address]

[place]  [address]



 

                          Phone                     [phone]







Care Team Providers





                    Care Team Member Name Role                Phone

 

                    [facility]          

      [phone]







Problems





                    Active Problems     Provider            Date

 

                    Mixed hyperlipidemia Teri Dickerson M.D. Onset: 

011

 

                    Depressive disorder Teri Dickerson M.D. Onset: 20

11

 

                    Menopausal and postmenopausal disorders Teri Dickerson M.D. Onset: 

2011

 

                    Bariatric Surgery Status Teri Dickerson M.D. Onset: 







Social History





                Type            Date            Description     Comments

 

                Birth Sex                       Unknown          

 

                Tobacco Use     Start: Unknown  Never Smoked Cigarettes  

 

                Tobacco Use     Start: Unknown  Never Used Smokeless Tobacco  

 

                ETOH Use                        Rarely consumes alcohol  

 

                Tobacco Use     Start: Unknown  Patient has never smoked  

 

                Recreational Drug Use                 Denies Drug Use  

 

                Smoking Status  Reviewed: 20 Patient has never smoked  

 

                Exercise Type/Frequency                 Exercises regularly Walk

s the dog twice a day 

 

                Tattoo/Piercing                 Pierced ears     

 

                Sun Exposure                    Minimum amount of sun exposure  

 

                Sun Exposure                    Uses sunscreen   

 

                Seat Belt/Car Seat                 Always uses seat belt  

 

                Bike Helmet                     Never           Unable to bike r

rikki 

 

                Smoke Alarms                    Yes              

 

                Smoke Alarms                    Carbon Monoxide Detector: Yes  







Allergies, Adverse Reactions, Alerts





             Active Allergies Reaction     Severity     Comments     Date

 

             Morphine     itching                                09/15/2011

 

                                        Inactive Allergies

 

             NKDA                                                2004







Medications





           Active Medications SIG        Qnty       Indications Ordering Provide

r Date

 

                                        Vitamin D (Ergocalciferol)              

       1.25mg (56591 Ut) Capsules       

                take one capsule by mouth once weekly 12caps                    

      Teri Dickerson M.D.                                    2021

 

                                        Bupropion Hydrochloride ER (SR)         

            150mg Tablets ER 12HR       

             1 by mouth twice a day 180tabs                   Teri Dickerson M.D. 06/10/2019

 

                          Escitalopram Oxalate                     20mg Tablets 

                  1 by 

mouth every day 90tabs          F32.1           Teri Dickerson M.D. 

018

 

                          Oxycodone HCL                     10mg Tablets        

           1 tab by mouth 

bid                                             Unknown         

 

                          Pantoprazole Sodium                     40mg Tablets D

R                   take 

one tablet by mouth every day for heartburn/ acid reflux 90tabs                 

                 Monica Mckinney FNP                              

 

                          Morphine Sulfate                     15mg Tablets     

              one tab by 

mouth tid                                       Unknown         

 

                    Potassium & Magnesium                      Capsules         

          one po qd           

                                        Unknown             

 

                          Vitamin D High Potency                     5000Iu Caps

ules                   1 

by mouth once a week every Monday                                 Unknown       

  

 

                          Aspir-81 Ec                     81mg Tablets DR       

            1 by mouth 

every day                                       Unknown         

 

                          Minocycline HCL                     50mg Capsules     

              1 by mouth 

every day                                       Unknown         







Immunizations





             CPT Code     Status       Date         Vaccine      Lot #

 

                [id number]           Given           10/16/2020      Influenza Virus 

Vaccine, Quadrivalent,age 3 and 

up,multidose vial                        

 

                [id number]           Given           2019      Influenza Virus 

Vaccine, Quadrivalent,age 3 and 

up,multidose vial                       [id number]

 

                [id number]           Given           2018      Influenza Virus 

Vaccine, Quadrivalent,age 3 and 

up,multidose vial                       [id number]

 

             [id number]        Given        01/15/2011   Adacel 11 Yrs or older  







Vital Signs





                Date            Vital           Result          Comment

 

                2021  5:01pm BP Systolic     108 mmHg         

 

                    BP Diastolic        73 mmHg              

 

                    Heart Rate          85 /min              

 

                    Body Temperature    97.3 F             

 

                    Respiratory Rate    18 /min              

 

                    Height              66.75 inches        5'6.75"

 

                    Weight              215.00 lb            

 

                    O2 % BldC Oximetry  99 %                 

 

                    Peak Expiratory Flow Rate 355                 Estimated Peak

 Flow Rate

 

                    Ideal Body Weight   130 lb               

 

                    BMI (Body Mass Index) 33.9 kg/m2           

 

                2020 12:11pm BP Systolic     127 mmHg         

 

                    BP Diastolic        60 mmHg              

 

                    Heart Rate          82 /min              

 

                    Body Temperature    97.5 F             

 

                    Respiratory Rate    16 /min              

 

                    Height              66.75 inches        5'6.75"

 

                    Weight              209.38 lb            

 

                    O2 % BldC Oximetry  98 %                 

 

                    Peak Expiratory Flow Rate 357                 Estimated Peak

 Flow Rate

 

                    Ideal Body Weight   130 lb               

 

                    BMI (Body Mass Index) 33.0 kg/m2           







Results





                                        Description

 

                                        No Information Available







Procedures





                Date            Code            Description     Status

 

                2020      09348005        Mammogram       Completed

 

                2020      [id number]       Bone Mineral Density Test Comple

lulu

 

                2018      [id number]        Colonoscopy     Completed

 

                2014      [id number]        Mammogram       Completed







Medical Devices





                                        Description

 

                                        No Information Available







Encounters





           Type       Date       Location   Provider   Dx         Diagnosis

 

           Office Visit 2021  4:45p Main Office Teri Dickerson M.D. F

32.1      Major 

depressive disorder, single episode, moderate

 

                          E78.2                     Mixed hyperlipidemia

 

                          M79.7                     Fibromyalgia

 

                          Z00.00                    Encntr for general adult med

ical exam w/o abnormal findings

 

                          R73.01                    Impaired fasting glucose

 

                          N94.11                    Superficial (introital) dysp

areunia







Assessments





                Date            Code            Description     Provider

 

                2021      F32.1           Major depressive disorder, singl

e episode, moderate Teri Dickerson M.D.

 

                2021      E78.2           Mixed hyperlipidemia YAJAIRA Dickerson M.D.

 

                2021      M79.7           Fibromyalgia    Teri Dickerson M.D.

 

                    2021          Z00.00              Encounter for genera

l adult medical examination without 

abnormal findings                       Teri Dickerson M.D.

 

                2021      R73.01          Impaired fasting glucose Teri Brewster M.D.

 

                2021      N94.11          Superficial (introital) dyspareu

aundrea Teri Dickerson M.D.







Plan of Treatment

Future Appointment(s):* 2021  4:45 pm - Teri Dickerson M.D. at Main 
  Office

2021 - Teri Dickerson M.D.* F32.1 Major depressive disorder, single 
  episode, moderate* Comments:* Doing well on medication.  Continue Lexapro.





* E78.2 Mixed hyperlipidemia* New Labs:* Comprehensive Metabolic Profil, 
  Scheduled: 21

* CBC With Differential, Scheduled: 21

* Lipid Panel, Scheduled: 21



* Comments:* staying off statins.  doing well.





* M79.7 Fibromyalgia* Comments:* Following with pain management and doing OK.





* Z00.00 Encounter for general adult medical examination without abnormal 
  findings* Follow up:* 6 moths with labs





* R73.01 Impaired fasting glucose* New Labs:* Hemoglobin A1c, Scheduled: 
  21



* Comments:* will get A1c in 6 months





* N94.11 Superficial (introital) dyspareunia* Recommendations:* Try Replense 
  over hte counter.  If this doesn't help we will resume topical estrogen.





* All * New Medication:* Vitamin D (Ergocalciferol) 1.25 mg (36138 Ut) - take 
  one capsule by mouth once weekly









Goals

2021 - Teri Dickerson M.D.* Z00.00 Encounter for general adult 
  medical examination without abnormal findings* BMI is elevated.  Work on 
  improving diet, portion control and aim for 30 minutes of moderate intensity 
  exercise at least 5 days a week.     







Functional Status





                Functional Condition Comment         Date            Status

 

                Trifocal glasses                                 Active

 

                Independent with all ADL's                                 Activ

e







Mental Status





                Mental Condition Comment         Date            Status

 

                None                                            Active







Referrals





                                        Description

 

                                        No Information Available

## 2021-01-20 NOTE — CCD
Summarization Of Episode

                             Created on: 2021



YUSRA DIETZ

External Reference #: [id number]

: 1960

Sex: Female



Demographics





                          Address                   [address] [place] [address]  [id number]

 

                          Home Phone                [phone]

 

                          Preferred Language        English

 

                          Marital Status            

 

                          Orthodox Affiliation     [facility]

 

                          Race                      White

 

                          Ethnic Group              Not  or 





Author





                          Author                    [facility]

 

                          Organization              [facility]

 

                          Address                   Unknown

 

                          Phone                     Unavailable







Support





                Name            Relationship    Address         Phone

 

                DISABLED        Next Of Kin     Unknown         Unavailable

 

                    GOLDY DIETZ    Next Of Kin         [id number] [place] [address]  [id number] [phone]

 

                    Goldy Dietz    ECON                [address] 

[place]  [address]               [phone]







Care Team Providers





                    Care Team Member Name Role                Phone

 

                    DAGOBERTO Dickerson MD Unavailable         Unavailable

 

                    Tuan, DAGOBERTO Williamson MD Unavailable         Unavailable

 

                    Tuan, DAGOBERTO Williamson MD Unavailable         Unavailable

 

                    Tuan, DAGOBERTO Williamson MD Unavailable         Unavailable

 

                    Tuan, DAGOBERTO Williamson MD Unavailable         Unavailable

 

                    Tuan, DAGOBERTO Williamson MD Unavailable         Unavailable

 

                    Tuan, DAGOBERTO Williamson MD Unavailable         Unavailable

 

                    Tuan, DAGOBERTO Williamson MD Unavailable         Unavailable

 

                    Tuan, DAGOBERTO Williamson MD Unavailable         Unavailable

 

                    Tuan, DAGOBERTO Williamson MD Unavailable         Unavailable

 

                    Tuan, DAGOBERTO Williamson MD Unavailable         Unavailable

 

                    Tuan, DAGOBERTO Williamson MD Unavailable         Unavailable

 

                    Tuan, DAGOBERTO Williamson MD Unavailable         Unavailable

 

                    Tuan, DAGOBERTO Williamson MD Unavailable         Unavailable

 

                    Tuan, DAGOBERTO Williamson MD Unavailable         Unavailable

 

                    Tuan, DAGOBERTO Williamson MD Unavailable         Unavailable

 

                    Tuan, DAGOBERTO Williamson MD Unavailable         Unavailable

 

                    Tuan, DAGOBERTO Williamson MD Unavailable         Unavailable

 

                    Tuan, DAGOBERTO Williamson MD Unavailable         Unavailable

 

                    Tuan, DAGOBERTO Williamson MD Unavailable         Unavailable

 

                    Tuan, DAGOBERTO Williamson MD Unavailable         Unavailable

 

                    Tuan, DAGOBERTO Williamson MD Unavailable         Unavailable

 

                    Tuan, DAGOBERTO Williamson MD Unavailable         Unavailable

 

                    Tuan, DAGOBERTO Williamson MD Unavailable         Unavailable

 

                    Tuan, DAGOBERTO Williamson MD Unavailable         Unavailable

 

                    Tuan, DAGOBERTO Williamson MD Unavailable         Unavailable

 

                    Tuan, DAGOBERTO Williamson MD Unavailable         Unavailable

 

                    Tuan, DAGOBERTO Williamson MD Unavailable         Unavailable

 

                    Tuan, DAGOBERTO Williamson MD Unavailable         Unavailable

 

                    Tuan, DAGOBERTO Williamson MD Unavailable         Unavailable

 

                    Tuan, DAGOBERTO Williamson MD Unavailable         Unavailable

 

                    Tuan, DAGOBERTO Williamson MD Unavailable         Unavailable

 

                    Tuan, DAGOBERTO Williamson MD Unavailable         Unavailable

 

                    Tuan, DAGOBERTO Williamson MD Unavailable         Unavailable

 

                    Tuan, DAGOBERTO Williamson MD Unavailable         Unavailable

 

                    Tuan, DAGOBERTO Williamson MD Unavailable         Unavailable

 

                    Tuan, DAGOBERTO Williamson MD Unavailable         Unavailable

 

                    Tuan, DAGOBERTO Williamson MD Unavailable         Unavailable

 

                    Tuan, DAGOBERTO Williamson MD Unavailable         Unavailable

 

                    Tuan, DAGOBERTO Williamson MD Unavailable         Unavailable

 

                    Tuan, DAGOBERTO Williamson MD Unavailable         Unavailable

 

                    Tuan, DAGOBERTO Williamson MD Unavailable         Unavailable

 

                    Tuan, DAGOBERTO Williamson MD Unavailable         Unavailable

 

                    Tuan, DAGOBERTO Williamson MD Unavailable         Unavailable

 

                    Tuan, DAGOBERTO Williamson MD Unavailable         Unavailable

 

                    Tuan, DAGOBERTO Williamson MD Unavailable         Unavailable

 

                    Tuan, DAGOBERTO Williamson MD Unavailable         Unavailable

 

                    Tuan, DAGOBERTO Williamson MD Unavailable         Unavailable

 

                    Tuan, DAGOBERTO Williamson MD Unavailable         Unavailable

 

                    Tuan, DAGOBERTO Williamson MD Unavailable         Unavailable

 

                    Tuan, DAGOBERTO Williamson MD Unavailable         Unavailable

 

                    Tuan, DAGOBERTO Williamson MD Unavailable         Unavailable

 

                    Tuan, DAGOBERTO Williamson MD Unavailable         Unavailable

 

                    Tuan, DAGOBERTO Williamson MD Unavailable         Unavailable

 

                    Tuan, DAGOBERTO Williamson MD Unavailable         Unavailable

 

                    Tuan, DAGOBERTO Williamson MD Unavailable         Unavailable

 

                    Tuan, DAGOBERTO Williamsno MD Unavailable         Unavailable

 

                    Tuan, DAGOBERTO Williamson MD Unavailable         Unavailable

 

                    Tuan, A Teri TORRES Unavailable         Unavailable

 

                    Tuan, DAGOBERTO Williamson MD Unavailable         Unavailable

 

                    Tuan, A Teri TORRES Unavailable         Unavailable

 

                    Tuan, DAGOBERTO Williamson MD Unavailable         Unavailable

 

                    Tuan, DAGOBERTO Williamson MD Unavailable         Unavailable

 

                    Tuan, DAGOBERTO Williamson MD Unavailable         Unavailable

 

                    Tuan, DAGOBERTO Williamson MD Unavailable         Unavailable

 

                    Tuan, DAGOBERTO Williamson MD Unavailable         Unavailable

 

                    Tuan, A Teri TORRES Unavailable         Unavailable

 

                    Tuan, A Teri TORRES Unavailable         Unavailable

 

                    Tuan, DAGOBERTO Williamson MD Unavailable         Unavailable

 

                    Tuan, DAGOBERTO Williamson MD Unavailable         Unavailable

 

                    Tuan, DAGOBERTO Williamson MD Unavailable         Unavailable

 

                    Tuan, DAGOBERTO Williamson MD Unavailable         Unavailable

 

                    Tuan, DAGOBERTO Williamson MD Unavailable         Unavailable

 

                    Tuan, DAGOBERTO Williamson MD Unavailable         Unavailable

 

                    Tuan, DAGOBERTO Williamson MD Unavailable         Unavailable

 

                    PATRICK Bundy MD   Unavailable         Unavailable

 

                    PATRICK Bundy MD   Unavailable         Unavailable

 

                    PATRICK Bundy MD   Unavailable         Unavailable

 

                    PATRICK Bundy MD   Unavailable         Unavailable

 

                    PATRICK Bundy MD   Unavailable         Unavailable

 

                    PATRICK Bundy MD   Unavailable         Unavailable

 

                    PATRICK Bundy MD   Unavailable         Unavailable

 

                    PATRICK Bundy MD   Unavailable         Unavailable

 

                    PATRICK Bundy MD   Unavailable         Unavailable

 

                    PATRICK Bundy MD   Unavailable         Unavailable

 

                    PATRICK Bundy MD   Unavailable         Unavailable

 

                    PATRICK Bundy MD   Unavailable         Unavailable

 

                    PATRICK Bundy MD   Unavailable         Unavailable

 

                    PATRICK Bundy MD   Unavailable         Unavailable

 

                    PATRICK Bundy MD   Unavailable         Unavailable

 

                    PATRICK Bundy MD   Unavailable         Unavailable

 

                    PATRICK Bundy MD   Unavailable         Unavailable

 

                    PATRICK Bundy MD   Unavailable         Unavailable

 

                    PATRICK Bundy MD   Unavailable         Unavailable

 

                    PATRICK Bundy MD   Unavailable         Unavailable

 

                    PATRICK Bundy MD   Unavailable         Unavailable

 

                    PATRICK Bundy MD   Unavailable         Unavailable

 

                    PATRICK Bundy MD   Unavailable         Unavailable

 

                    PATRICK Bundy MD   Unavailable         Unavailable

 

                    PATRICK Bundy MD   Unavailable         Unavailable

 

                    PATRICK Bundy MD   Unavailable         Unavailable

 

                    PATRICK Bundy MD   Unavailable         Unavailable

 

                    PATRICK Bundy MD   Unavailable         Unavailable

 

                    PATRICK Bundy MD   Unavailable         Unavailable

 

                    PATRICK Bundy MD   Unavailable         Unavailable

 

                    PATRICK Bundy MD   Unavailable         Unavailable

 

                    PATRICK Bundy MD   Unavailable         Unavailable

 

                    PATRICK Bundy MD   Unavailable         Unavailable

 

                    PATRICK Bundy MD   Unavailable         Unavailable

 

                    PATRICK Bundy MD   Unavailable         Unavailable

 

                    PATRICK Bundy MD   Unavailable         Unavailable

 

                    PATRICK Bundy MD   Unavailable         Unavailable

 

                    PATRICK Bundy MD   Unavailable         Unavailable

 

                    PATRICK Bundy MD   Unavailable         Unavailable

 

                    PATRICK Bundy MD   Unavailable         Unavailable

 

                    PATRICK Bundy MD   Unavailable         Unavailable

 

                    PATRICK Bundy MD   Unavailable         Unavailable

 

                    PATRICK Bundy MD   Unavailable         Unavailable

 

                    PATRICK Bundy MD   Unavailable         Unavailable

 

                    PATRICK Bundy MD   Unavailable         Unavailable

 

                    PATRICK Bundy MD   Unavailable         Unavailable

 

                    PATRICK Bundy MD   Unavailable         Unavailable

 

                    PATRICK Bundy MD   Unavailable         Unavailable

 

                    PATRICK Bundy MD   Unavailable         Unavailable

 

                    PATRICK Bundy MD   Unavailable         Unavailable

 

                    PATRICK Bundy MD   Unavailable         Unavailable

 

                    PATRICK Bundy MD   Unavailable         Unavailable

 

                    PATRICK Bundy MD   Unavailable         Unavailable

 

                    PATRICK Bundy MD   Unavailable         Unavailable

 

                    PATRICK Bundy MD   Unavailable         Unavailable

 

                    PATRICK Bundy MD   Unavailable         Unavailable

 

                    PATRICK Bundy MD   Unavailable         Unavailable

 

                    PATRICK Bundy MD   Unavailable         Unavailable

 

                    PATRICK Bundy MD   Unavailable         Unavailable

 

                    PATRICK Bundy MD   Unavailable         Unavailable

 

                    PATRICK Bundy MD   Unavailable         Unavailable

 

                    PATRICK Bundy MD   Unavailable         Unavailable

 

                    PATRICK Bundy MD   Unavailable         Unavailable

 

                    PATRICK Bundy MD   Unavailable         Unavailable

 

                    PATRICK Bundy MD   Unavailable         Unavailable

 

                    PATRICK Bundy MD   Unavailable         Unavailable

 

                    PATRICK Bundy MD   Unavailable         Unavailable

 

                    PATRICK Bundy MD   Unavailable         Unavailable

 

                    PATRICK Bundy MD   Unavailable         Unavailable

 

                    PATRICK Bundy MD   Unavailable         Unavailable

 

                    PATRICK Bundy MD   Unavailable         Unavailable

 

                    PATRICK Bundy MD   Unavailable         Unavailable

 

                    PATRICK Bundy MD   Unavailable         Unavailable

 

                    PATRICK Bundy MD   Unavailable         Unavailable

 

                    PATRICK Bundy MD   Unavailable         Unavailable

 

                    PATRICK Bundy MD   Unavailable         Unavailable

 

                    PATRICK Bundy MD   Unavailable         Unavailable

 

                    PATRICK Bundy MD   Unavailable         Unavailable

 

                    PATRICK uBndy MD   Unavailable         Unavailable

 

                    PATRICK Bundy MD   Unavailable         Unavailable

 

                    PATRICK Bundy MD   Unavailable         Unavailable

 

                    PATRICK Bundy MD   Unavailable         Unavailable

 

                    PATRICK Bundy MD   Unavailable         Unavailable

 

                    PATRICK Bundy MD   Unavailable         Unavailable

 

                    PATRICK Bundy MD   Unavailable         Unavailable

 

                    PATRICK Bundy MD   Unavailable         Unavailable

 

                    PATRICK Bundy MD   Unavailable         Unavailable

 

                    PATRICK Bundy MD   Unavailable         Unavailable

 

                    PATRICK Bundy MD   Unavailable         Unavailable

 

                    MISHA Salazar MD Unavailable         Unavailable

 

                    Miller, L Ana NP Unavailable         Unavailable

 

                    Miller, L Ana NP Unavailable         Unavailable

 

                    Miller, L Ana NP Unavailable         Unavailable

 

                    Miller, L Ana NP Unavailable         Unavailable

 

                    Miller, L Ana NP Unavailable         Unavailable

 

                    Miller, L Ana NP Unavailable         Unavailable

 

                    Miller, L Ana NP Unavailable         Unavailable

 

                    Miller, L Ana NP Unavailable         Unavailable

 

                    Miller, L Ana NP Unavailable         Unavailable

 

                    Miller, L Ana NP Unavailable         Unavailable

 

                    Miller, L Ana NP Unavailable         Unavailable

 

                    Miller, L Ana NP Unavailable         Unavailable

 

                    Miller, L Ana NP Unavailable         Unavailable

 

                    Miller, L Ana NP Unavailable         Unavailable

 

                    Miller, L Ana NP Unavailable         Unavailable

 

                    Miller, L Ana NP Unavailable         Unavailable

 

                    Miller, L Ana NP Unavailable         Unavailable

 

                    Miller, L Ana NP Unavailable         Unavailable

 

                    Miller, L Ana NP Unavailable         Unavailable

 

                    Miller, L Ana NP Unavailable         Unavailable

 

                    Miller, L Ana NP Unavailable         Unavailable

 

                    Miller, L Ana NP Unavailable         Unavailable

 

                    Miller, L Ana NP Unavailable         Unavailable

 

                    Miller, L Ana NP Unavailable         Unavailable

 

                    Miller, L Ana NP Unavailable         Unavailable

 

                    Miller, L Ana NP Unavailable         Unavailable

 

                    Miller, L Ana NP Unavailable         Unavailable

 

                    Miller, L Ana NP Unavailable         Unavailable

 

                    Miller, L Ana NP Unavailable         Unavailable

 

                    GLAZA, D ARABELLA NP   Unavailable         Unavailable

 

                    GLAZA, D ARABELLA NP   Unavailable         Unavailable

 

                    GLAZA, D ARABELLA NP   Unavailable         Unavailable

 

                    GLAZA, D ARABELLA NP   Unavailable         Unavailable

 

                    GLAZA, D ARABELLA NP   Unavailable         Unavailable

 

                    GLAZA, D ARABELLA NP   Unavailable         Unavailable

 

                    GLAZA, D ARABELLA NP   Unavailable         Unavailable

 

                    GLAZA, D ARABELLA NP   Unavailable         Unavailable

 

                    GLAZA, D ARABELLA NP   Unavailable         Unavailable

 

                    GLAZA, D ARABELLA NP   Unavailable         Unavailable

 

                    GLAZA, D ARABELLA NP   Unavailable         Unavailable

 

                    GLAZA, D ARABELLA NP   Unavailable         Unavailable

 

                    GLAZA, D ARABELLA NP   Unavailable         Unavailable

 

                    GLAZA, D ARABELLA NP   Unavailable         Unavailable

 

                    GLAZA, D ARABELLA NP   Unavailable         Unavailable

 

                    GLAZA, D ARABELLA NP   Unavailable         Unavailable

 

                    GLAZA, D ARABELLA NP   Unavailable         Unavailable

 

                    GLAZA, D ARABELLA NP   Unavailable         Unavailable

 

                    GLAZA, D ARABELLA NP   Unavailable         Unavailable

 

                    GLAZA, D ARABELLA NP   Unavailable         Unavailable

 

                    GLAZA, D ARABELLA NP   Unavailable         Unavailable

 

                    GLAZA, D ARABELLA NP   Unavailable         Unavailable

 

                    GLAZA, D ARABELLA NP   Unavailable         Unavailable

 

                    GLAZA, D ARABELLA NP   Unavailable         Unavailable

 

                    GLAZA, D ARABELLA NP   Unavailable         Unavailable

 

                    GLAZA, D ARABELLA NP   Unavailable         Unavailable

 

                    GLAZA, D ARABELLA NP   Unavailable         Unavailable

 

                    GLAZA, D ARABELLA NP   Unavailable         Unavailable

 

                    GLAZA, D ARABELLA NP   Unavailable         Unavailable

 

                    GLAZA, D ARABELLA NP   Unavailable         Unavailable

 

                    GLAZA, D ARABELAL NP   Unavailable         Unavailable

 

                    GLAZA, D ARABELLA NP   Unavailable         Unavailable

 

                    GLAZA, D ARABELLA NP   Unavailable         Unavailable

 

                    GLAZA, D ARABELLA NP   Unavailable         Unavailable

 

                    GLAZA, D ARABELLA NP   Unavailable         Unavailable

 

                    GLAZA, D ARABELLA NP   Unavailable         Unavailable

 

                    GLAZA, D ARABELLA NP   Unavailable         Unavailable

 

                    GLAZA, D ARABELLA NP   Unavailable         Unavailable

 

                    Petrancosta, Aurora Raya PA-C Unavailable         Unavailabl

e

 

                    Petrancosta, Aurora Raya PA-C Unavailable         Unavailabl

e

 

                    Petrancosta, Aurora Raya PA-C Unavailable         Unavailabl

e

 

                    Petrancosta, Aurora Raya PA-C Unavailable         Unavailabl

e

 

                    Petrancosta, Aurora Raya PA-C Unavailable         Unavailabl

e

 

                    Petrancosta, Aurora Raya PA-C Unavailable         Unavailabl

e

 

                    Petrancosta, Aurora Raya PA-C Unavailable         Unavailabl

e

 

                    Petrancosta, Aurora Raya PA-C Unavailable         Unavailabl

e

 

                    Petrancosta, Aurora Raya PA-C Unavailable         Unavailabl

e

 

                    Petrancosta, Aurora Raya PA-C Unavailable         Unavailabl

e

 

                    Petrancosta, Aurora Raya PA-C Unavailable         Unavailabl

e

 

                    Petrancosta, Aurora Raya PA-C Unavailable         Unavailabl

e

 

                    Petrancosta, Aurora Raya PA-C Unavailable         Unavailabl

e

 

                    Petrancosta, Aurora Raya PA-C Unavailable         Unavailabl

e

 

                    Petrancosta, Aurora Raya PA-C Unavailable         Unavailabl

e

 

                    Petrancosta, Aurora Raya PA-C Unavailable         Unavailabl

e

 

                    Petrancosta, Aurora Raya PA-C Unavailable         Unavailabl

e

 

                    Petrancosta, Aurora Raya PA-C Unavailable         Unavailabl

e

 

                    Petrancosta, Aurora Raya PA-C Unavailable         Unavailabl

e

 

                    Petrancosta, Aurora Raya PA-C Unavailable         Unavailabl

e

 

                    Petrancosta, Aurora Raya PA-C Unavailable         Unavailabl

e

 

                    Petrancosta, Aurora Raya PA-C Unavailable         Unavailabl

e

 

                    Petrancosta, Aurora Raya PA-C Unavailable         Unavailabl

e

 

                    Lysius, B Midzy NP  Unavailable         Unavailable

 

                    Lysius, B Midzy NP  Unavailable         Unavailable

 

                    Lysius, B Midzy NP  Unavailable         Unavailable

 

                    Lysius, B Midzy NP  Unavailable         Unavailable

 

                    Lysius, B Midzy NP  Unavailable         Unavailable

 

                    Lysius, B Midzy NP  Unavailable         Unavailable

 

                    Lysius, B Midzy NP  Unavailable         Unavailable

 

                    Lysius, B Midzy NP  Unavailable         Unavailable

 

                    Lysius, B Midzy NP  Unavailable         Unavailable

 

                    Lysius, B Midzy NP  Unavailable         Unavailable

 

                    Lysius, B Midzy NP  Unavailable         Unavailable

 

                    Lysius, B Midzy NP  Unavailable         Unavailable

 

                    Lysius, B Midzy NP  Unavailable         Unavailable

 

                    Lysius, B Midzy NP  Unavailable         Unavailable

 

                    Lysius, B Midzy NP  Unavailable         Unavailable

 

                    Lysius, B Midzy NP  Unavailable         Unavailable

 

                    Lysius, B Midzy NP  Unavailable         Unavailable

 

                    Lysius, B Midzy NP  Unavailable         Unavailable

 

                    Lysius, B Midzy NP  Unavailable         Unavailable

 

                    Lysius, B Midzy NP  Unavailable         Unavailable

 

                    Lysius, B Midzy NP  Unavailable         Unavailable

 

                    Lysius, B Midzy NP  Unavailable         Unavailable

 

                    Lysius, B Midzy NP  Unavailable         Unavailable

 

                    Lysius, B Midzy NP  Unavailable         Unavailable

 

                    Lysius, B Midzy NP  Unavailable         Unavailable

 

                    Lysius, B Midzy NP  Unavailable         Unavailable

 

                    Lysius, B Midzy NP  Unavailable         Unavailable

 

                    Lysius, B Midzy NP  Unavailable         Unavailable

 

                    Ramon, L Sharri FNP Unavailable         Unavailable

 

                    Uvalda, L Sharri FNP Unavailable         Unavailable

 

                    Ramon, L Sharri FNP Unavailable         Unavailable

 

                    Ramon, L Sharri FNP Unavailable         Unavailable

 

                    Uvalda, L Sharri FNP Unavailable         Unavailable

 

                    Ramon, L Sharri FNP Unavailable         Unavailable

 

                    Ramon, L Sharri FNP Unavailable         Unavailable

 

                    Ramon, L Sharri FNP Unavailable         Unavailable

 

                    Uvalda, L Sharri FNP Unavailable         Unavailable

 

                    Ramon, L Sharri FNP Unavailable         Unavailable

 

                    Ramon, L Sharri FNP Unavailable         Unavailable

 

                    Uvalda, L Sharri FNP Unavailable         Unavailable

 

                    Uvalda, L Sharri FNP Unavailable         Unavailable

 

                    Uvalda, L Sharri FNP Unavailable         Unavailable

 

                    Ramon, L Sharri FNP Unavailable         Unavailable

 

                    Ramon, L Sharri FNP Unavailable         Unavailable

 

                    Ramon, L Sharri FNP Unavailable         Unavailable

 

                    Uvalda, L Sharri FNP Unavailable         Unavailable

 

                    Ramon, L Sharri FNP Unavailable         Unavailable

 

                    Ramon, L Sharri FNP Unavailable         Unavailable

 

                    Ramon, L Sharri FNP Unavailable         Unavailable

 

                    Ramon, L Sharri FNP Unavailable         Unavailable

 

                    Uvalda, L Sharri FNP Unavailable         Unavailable

 

                    Ramon, L Sharri FNP Unavailable         Unavailable

 

                    Ramon, L Sharri FNP Unavailable         Unavailable

 

                    Uvalda, L Sharri FNP Unavailable         Unavailable

 

                    Uvalda, L Sharri FNP Unavailable         Unavailable

 

                    Ramon, L Sharri FNP Unavailable         Unavailable

 

                    Uvalda, L Sharri FNP Unavailable         Unavailable

 

                    Uvalda, L Sharri FNP Unavailable         Unavailable

 

                    Uvalda, L Sharri FNP Unavailable         Unavailable

 

                    Uvalda, L Sharri FNP Unavailable         Unavailable

 

                    Uvalda, L Sharri FNP Unavailable         Unavailable

 

                    Uvalda, L Sharri FNP Unavailable         Unavailable

 

                    Ramon, L Sharri FNP Unavailable         Unavailable

 

                    Ramon, L Sharri FNP Unavailable         Unavailable

 

                    Uvalda, L Sharri FNP Unavailable         Unavailable

 

                    Ramon, L Sharri FNP Unavailable         Unavailable

 

                    Uvalda, L Sharri FNP Unavailable         Unavailable

 

                    Ramon, L Sharri FNP Unavailable         Unavailable

 

                    Salazar,  Salinas  Unavailable         [phone]

 

                    Salazar,  Salinas  Unavailable         [phone]

 

                    Salazar,  Salinas  Unavailable         [phone]

 

                    Salazar,  Salinas  Unavailable         [phone]

 

                    Salazar,  Salinas  Unavailable         [phone]

 

                    Salazar,  Salinas  Unavailable         [phone]

 

                    Salazar,  Salinas  Unavailable         [phone]

 

                    Salazar,  Salinas  Unavailable         [phone]

 

                    Salazar,  Salinas  Unavailable         [phone]

 

                    Salazar,  Salinas  Unavailable         [phone]

 

                    Salazar,  Salinas  Unavailable         [phone]

 

                    Salazar,  Salinas  Unavailable         [phone]

 

                    Salazar,  Salinas  Unavailable         [phone]

 

                    Salazar,  Salinas  Unavailable         [phone]

 

                    Salazar,  Salinas  Unavailable         [phone]

 

                    Salazar,  Salinas  Unavailable         [phone]

 

                    Salazar,  Salinas  Unavailable         [phone]

 

                    Salazar,  Salinas  Unavailable         [phone]

 

                    Salazar,  Salinas  Unavailable         [phone]

 

                    Salazar,  Salinas  Unavailable         [phone]

 

                    Salazar,  Salinas  Unavailable         [phone]

 

                    Salazar,  Salinas  Unavailable         [phone]

 

                    Salazar,  Salinas  Unavailable         [phone]

 

                    Salazar,  Salinas  Unavailable         [phone]

 

                    Salazar,  Salinas  Unavailable         [phone]

 

                    Salazar,  Salinas  Unavailable         [phone]

 

                    Salazar,  Salinas  Unavailable         [phone]

 

                    RAMON,  ZORYANA NP Unavailable         Unavailable

 

                    RAMON,  ZORYANA NP Unavailable         Unavailable

 

                    RAMON,  ZORYANA NP Unavailable         Unavailable

 

                    RAMON,  ZORYANA NP Unavailable         Unavailable

 

                    RAMON,  ZORYANA NP Unavailable         Unavailable

 

                    RAMON,  ZORYANA NP Unavailable         Unavailable

 

                    RAMON,  ZORYANA NP Unavailable         Unavailable

 

                    RAMON,  ZORYANA NP Unavailable         Unavailable

 

                    RAMON,  ZORYANA NP Unavailable         Unavailable

 

                    RAMON,  ZORYANA NP Unavailable         Unavailable

 

                    RAMON,  ZORYANA NP Unavailable         Unavailable

 

                    RAMON,  ZORYANA NP Unavailable         Unavailable

 

                    RAMON,  ZORYANA NP Unavailable         Unavailable

 

                    RAMON,  ZORYANA NP Unavailable         Unavailable

 

                    RAMON,  ZORYANA NP Unavailable         Unavailable

 

                    RAMON,  ZORYANA NP Unavailable         Unavailable

 

                    RAMON,  ZORYANA NP Unavailable         Unavailable

 

                    RAMNO,  ZORYANA NP Unavailable         Unavailable

 

                    RAMON,  ZORYANA NP Unavailable         Unavailable

 

                    RAMON,  ZORYANA NP Unavailable         Unavailable

 

                    RAMON,  ZORYANA NP Unavailable         Unavailable

 

                    RAMON,  ZORYANA NP Unavailable         Unavailable

 

                    RAMON,  ZORYANA NP Unavailable         Unavailable

 

                    RAMON,  ZORYANA NP Unavailable         Unavailable

 

                    RAMON,  ZORYANA NP Unavailable         Unavailable

 

                    RAMON,  ZORYANA NP Unavailable         Unavailable

 

                    RAMON,  ZORYANA NP Unavailable         Unavailable

 

                    RAMON,  ZORYANA NP Unavailable         Unavailable

 

                    RAMON,  ZORYANA NP Unavailable         Unavailable

 

                    RAMON,  ZORYANA NP Unavailable         Unavailable

 

                    RAMON,  ZORYANA NP Unavailable         Unavailable

 

                    RAMON,  ZORYANA NP Unavailable         Unavailable

 

                    RAMON,  ZORYANA NP Unavailable         Unavailable

 

                    RAMON,  ZORYANA NP Unavailable         Unavailable

 

                    RAMON,  ZORYANA NP Unavailable         Unavailable

 

                    RAMON,  ZORYANA NP Unavailable         Unavailable

 

                    RAMON,  ZORYANA NP Unavailable         Unavailable

 

                    RAMON,  ZORYANA NP Unavailable         Unavailable



                                  



Re-disclosure Warning

          The records that you are about to access may contain information from 
federally-assisted alcohol or drug abuse programs. If such information is 
present, then the following federally mandated warning applies: This information
has been disclosed to you from records protected by federal confidentiality 
rules (42 CFR part 2). The federal rules prohibit you from making any further 
disclosure of this information unless further disclosure is expressly permitted 
by the written consent of the person to whom it pertains or as otherwise 
permitted by 42 CFR part 2. A general authorization for the release of medical 
or other information is NOT sufficient for this purpose. The Federal rules 
restrict any use of the information to criminally investigate or prosecute any 
alcohol or drug abuse patient.The records that you are about to access may 
contain highly sensitive health information, the redisclosure of which is 
protected by Article 27-F of the [facility] Public Health law. If you 
continue you may have access to information: Regarding HIV / AIDS; Provided by 
facilities licensed or operated by the [facility] Office of Mental Health; 
or Provided by the [facility] Office for People With Developmental 
Disabilities. If such information is present, then the following [place] 
mandated warning applies: This information has been disclosed to you from 
confidential records which are protected by state law. State law prohibits you 
from making any further disclosure of this information without the specific 
written consent of the person to whom it pertains, or as otherwise permitted by 
law. Any unauthorized further disclosure in violation of state law may result in
a fine or shelter sentence or both. A general authorization for the release of 
medical or other information is NOT sufficient authorization for further disc
losure.                                                                         
    



Allergies and Adverse Reactions

          



           Type       Description Substance  Reaction   Status     Data Source(s

)

 

           Allergy to substance Allergy to substance Allergy to substance       

                [facility] ([facility])



                                                                                
       



Family History

          



             Family Member Name Family Member Gender Family Member Status Date o

f Status 

Description                             Data Source(s)

 

           Unknown    Male       Problem                          [facility] ([facility]

 

           Unknown    Male       Problem                          [facility] ([facility], )

 

                                        () 

 

           Unknown    Female     Problem                          [facility]

 

           Unknown    Female     Problem                          [facility]

 

           Unknown    Female     Problem                          [facility]



                                                                                
                           



Encounters

          



           Encounter  Providers  Location   Date       Indications Data Source(s

)

 

                Outpatient      Attender: Raya Avelar PA-C Main Office   

  2021 08:15:00 AM 

EST                                                 [facility] ([facility]

 

                          Osmani Bundy MD: [address] [place] [id number]

[phone], [phone]. [phone] 

Attender: Osmani Bundy MD  [facility] 

2021 12:00:00 AM EST                           [facility] ([facility])

 

           Outpatient Attender: Teri Dickerson MD Main Office 2021 03:45:0

0 PM EST            

[facility] ([facility]

 

                Outpatient      Attender: ARABELLA BROWN NPReferrer: Teri Dickerson MD                 12/10/2020 

01:47:28 PM EST                                     [facility] 

[place]

 

                Outpatient      Attender: ARABELLA BROWN NPReferrer: Teri Dickerson MD                 10/28/2020 

08:57:23 AM EDT                                     [facility]

 

                Outpatient      Attender: Sharri Navarro FNPReferrer: Teri langley MD                 10/09/2020 

04:06:53 PM EDT                                     [facility]

 

                Outpatient      Attender: ARABELLA BROWN NPReferrer: Teri Dickerson MD                 09/10/2020 

08:23:42 AM EDT                                     [facility]

 

                Outpatient      Attender: Valentina Ramirez NPReferrer: Teri vegas MD                 2020 

03:07:09 PM EDT                                     [facility]

 

                Outpatient      Attender: ARABELLA BROWN NPReferrer: Teri Dickerson MD                 07/10/2020 

10:05:06 AM EDT                                     [facility]

 

           Outpatient Attender: Teri Dickerson MD Main Office 2020 11:40:0

0 AM EDT            

[facility] ([facility]

 

                Outpatient      Attender: ARABELLA BROWN NPReferrer: Teri Dickerson MD                 2020 

08:27:02 AM EDT                                     [facility]

 

                Outpatient      Attender: ARABELLA BROWN NPReferrer: Teri Dickerson MD                 2020 

06:52:40 PM EDT                                     [facility]

 

                Outpatient      Attender: Ana Miller NPReferrer: Teri Lane ams, MD                 2020 

02:51:37 PM EDT                                     [facility]

 

                Outpatient      Attender: ARABELLA BROWN NPReferrer: Teri Dickerson MD                 2020 

12:20:32 PM EST                                     [facility]

 

                Recurring Patient Attender: ARABELLA BROWN NPReferrer: Sal[place] cleary MD                 

2020 03:14:28 PM EST                           [facility]

 

                Outpatient      Attender: DAVE NAVARRO NPReferrer: Teri Lane ams, MD                 2020 

08:52:56 PM EST                                     [facility]

 

                Outpatient      Attender: ARABELLA BROWN NPReferrer: Teri Dickerson MD                 2020 

04:27:55 PM EST                                     [facility]

 

                Outpatient      Attender: Sharri Navarro FNPReferrer: [place] roy                 2019 

08:31:05 AM EST                                     [facility]



                                                                                
                                                                                
                                                                                
               



Immunizations

          



             Vaccine      Date         Status       Description  Data Source(s)

 

             New in .  IIV4 10/16/2020 07:37:00 AM EDT completed            

     [facility]

 

             New in .  IIV4 2019 12:27:00 PM EST completed            

     [facility]



                                                                                
                 



Medications

          



          Medication Brand Name Start Date Product Form Dose      Route     Admi

nistrative 

Instructions Pharmacy Instructions Status     Indications Reaction   Description

 Data 

Source(s)

 

                    Ergocalciferol 23933 UNT Oral Capsule Vitamin D (Ergocalcife

rol) 2021 

12:00:00 AM EST               ORAL                 active                      M

[facility] ([facility]

 

           mg           2021 12:00:00 AM EST tablet    60           

       TAKE ONE TABLET BY MOUTH 

TWICE A DAY AS NEEDED FOR PAIN, MAXIMUM DAILY DOSE = TWO TABLETS TAKE ONE TABLET

BY MOUTH TWICE A DAY AS NEEDED FOR PAIN, MAXIMUM DAILY DOSE = TWO TABLETS SOLD: 

2021                                                      [facility]

 

          15 mg               2021 12:00:00 AM EST tablet    90           

       TAKE ONE TABLET BY MOUTH EVERY 8 

HOURS MAXIMUM DAILY DOSE = THREE TABLETS TAKE ONE TABLET BY MOUTH EVERY 8 HOURS 

MAXIMUM DAILY DOSE = THREE TABLETS SOLD: 2021                             

           [facility]

 

           mg           2020 12:00:00 AM EST tablet    60           

       TAKE ONE TABLET BY MOUTH 

TWICE A DAY AS NEEDED FOR PAIN MAXIMUM DAILY DOSE = 2 TAKE ONE TABLET BY MOUTH 

TWICE A DAY AS NEEDED FOR PAIN MAXIMUM DAILY DOSE = 2 SOLD: 2020          

                              

[facility]

 

          15 mg               2020 12:00:00 AM EST tablet extended release

 90                  TAKE ONE TABLET 

BY MOUTH EVERY 8 HOURS MAXIMUM DAILY DOSE = 3 TAKE ONE TABLET BY MOUTH EVERY 8 

HOURS MAXIMUM DAILY DOSE = 3 SOLD: 2020                                   

     [facility]

 

           mg           2020 12:00:00 AM EST tablet    90           

       TAKE ONE TABLET BY MOUTH 

THREE TIMES A DAY AS NEEDED FOR PAIN MAXIMUM DAILY DOSE = 3 TABLETS TAKE ONE 

TABLET BY MOUTH THREE TIMES A DAY AS NEEDED FOR PAIN MAXIMUM DAILY DOSE = 3 
TABLETS      SOLD: 2020                                        [facility]

s

 

          15 mg               2020 12:00:00 AM EST tablet extended release

 90                  TAKE ONE TABLET 

BY MOUTH EVERY 8 HOURS MAXIMUM DAILY DOSE = 3 TABLETS TAKE ONE TABLET BY MOUTH 

EVERY 8 HOURS MAXIMUM DAILY DOSE = 3 TABLETS SOLD: 2020                   

                     [facility]

 

           mg           10/09/2020 12:00:00 AM EDT tablet    90           

       TAKE ONE TABLET BY MOUTH 

THREE TIMES A DAY AS NEEDED FOR PAIN MAXIMUM DAILY DOSE = 3 TAKE ONE TABLET BY 

MOUTH THREE TIMES A DAY AS NEEDED FOR PAIN MAXIMUM DAILY DOSE = 3 SOLD: 

10/09/2020                                                      [facility]

 

          15 mg               10/09/2020 12:00:00 AM EDT tablet extended release

 90                  TAKE ONE TABLET 

BY MOUTH EVERY 8 HOURS MAXIMUM DAILY DOSE = 3 TAKE ONE TABLET BY MOUTH EVERY 8 

HOURS MAXIMUM DAILY DOSE = 3 SOLD: 10/09/2020                                   

     [facility]

 

           mg           2020 12:00:00 AM EDT tablet    90           

       TAKE ONE TABLET BY MOUTH 

THREE TIMES A DAY AS NEEDED FOR PAIN MAXIMUM DAILY DOSE = 3 TAKE ONE TABLET BY 

MOUTH THREE TIMES A DAY AS NEEDED FOR PAIN MAXIMUM DAILY DOSE = 3 SOLD: 

2020                                                      [facility]

 

                    Cyclobenzaprine hydrochloride 10 MG Oral Tablet CYCLOBENZAPR

INE HCL 2020 

12:00:00 AM EDT tablet          15                              TAKE ONE TABLET 

BY MOUTH THREE TIMES A DAY AS NEEDED

FOR SPASMS MAXIMUM DAILY DOSE = 3       TAKE ONE TABLET BY MOUTH THREE TIMES A D

AY AS 

NEEDED FOR SPASMS MAXIMUM DAILY DOSE = 3 SOLD: 2020                       

                 [facility]

 

          15 mg               2020 12:00:00 AM EDT tablet extended release

 90                  TAKE ONE TABLET 

BY MOUTH EVERY 8 HOURS MAXIMUM DAILY DOSE = 3 TAKE ONE TABLET BY MOUTH EVERY 8 

HOURS MAXIMUM DAILY DOSE = 3 SOLD: 2020                                   

     [facility]

 

           mg           2020 12:00:00 AM EDT tablet    90           

       TAKE ONE TABLET BY MOUTH 

THREE TIMES A DAY AS NEEDED FOR PAIN MAXIMUM DAILY DOSE = 3 TABLETS TAKE ONE 

TABLET BY MOUTH THREE TIMES A DAY AS NEEDED FOR PAIN MAXIMUM DAILY DOSE = 3 
TABLETS      SOLD: 2020                                        [facility]

s

 

          15 mg               2020 12:00:00 AM EDT tablet extended release

 90                  TAKE ONE TABLET 

BY MOUTH EVERY 8 HOURS MAXIMUM DAILY DOSE = 3 TABLETS TAKE ONE TABLET BY MOUTH 

EVERY 8 HOURS MAXIMUM DAILY DOSE = 3 TABLETS SOLD: 2020                   

                     [facility]

 

          15 mg               2020 12:00:00 AM EDT tablet extended release

 90                  TAKE ONE TABLET 

BY MOUTH EVERY 8 HOURS MAXIMUM DAILY DOSE = THREE TABLETS TAKE ONE TABLET BY 

MOUTH EVERY 8 HOURS MAXIMUM DAILY DOSE = THREE TABLETS SOLD: 2020         

                               

[facility]

 

           mg           2020 12:00:00 AM EDT tablet    90           

       TAKE ONE TABLET BY MOUTH 

THREE TIMES A DAY AS NEEDED MAXIMUM DAILY DOSE = THREE TABLETS TAKE ONE TABLET 

BY MOUTH THREE TIMES A DAY AS NEEDED MAXIMUM DAILY DOSE = THREE TABLETS SOLD: 

2020                                                      [facility]

 

           mg           2020 12:00:00 AM EDT tablet    90           

       TAKE ONE TABLET BY MOUTH 

THREE TIMES A DAY AS NEEDED FOR PAIN MAXIMUM DAILY DOSE = 3 TABLETS TAKE ONE 

TABLET BY MOUTH THREE TIMES A DAY AS NEEDED FOR PAIN MAXIMUM DAILY DOSE = 3 
TABLETS      SOLD: 2020                                        [facility]

s

 

          15 mg               2020 12:00:00 AM EDT tablet extended release

 90                  TAKE ONE TABLET 

BY MOUTH EVERY 8 HOURS MAXIMUM DAILY DOSE = 3 TABLETS TAKE ONE TABLET BY MOUTH 

EVERY 8 HOURS MAXIMUM DAILY DOSE = 3 TABLETS SOLD: 2020                   

                     [facility]

 

           mg           2020 12:00:00 AM EDT tablet    60           

       TAKE ONE TABLET BY MOUTH 

EVERY 12 HOURS AS NEEDED FOR PAIN MAXIMUM DAILY DOSE = 2 TAKE ONE TABLET BY 

MOUTH EVERY 12 HOURS AS NEEDED FOR PAIN MAXIMUM DAILY DOSE = 2 SOLD: 2020 

         

                                                            [facility]

 

          15 mg               2020 12:00:00 AM EDT tablet extended release

 90                  TAKE ONE TABLET 

BY MOUTH EVERY 8 HOURS MAXIMUM DAILY DOSE = 3 TAKE ONE TABLET BY MOUTH EVERY 8 

HOURS MAXIMUM DAILY DOSE = 3 SOLD: 2020                                   

     [facility]

 

           mg           2020 12:00:00 AM EDT tablet    60           

       TAKE ONE TABLET BY MOUTH 

EVERY 12 HOURS AS NEEDED FOR PAIN MAXIMUM DAILY DOSE = 2 TABLETS TAKE ONE TABLET

BY MOUTH EVERY 12 HOURS AS NEEDED FOR PAIN MAXIMUM DAILY DOSE = 2 TABLETS SOLD: 

2020                                                      [facility]

 

          15 mg               2020 12:00:00 AM EDT tablet extended release

 90                  TAKE ONE TABLET 

BY MOUTH EVERY 8 HOURS MAXIMUM DAILY DOSE = 3 TABLETS TAKE ONE TABLET BY MOUTH 

EVERY 8 HOURS MAXIMUM DAILY DOSE = 3 TABLETS SOLD: 2020                   

                     [facility]

 

          15 mg               2020 12:00:00 AM EST tablet extended release

 90                  TAKE ONE TABLET 

BY MOUTH EVERY 8 HOURS MAXIMUM DAILY DOSE = 3 TABLETS TAKE ONE TABLET BY MOUTH 

EVERY 8 HOURS MAXIMUM DAILY DOSE = 3 TABLETS SOLD: 2020                   

                     [facility]

 

           mg           2020 12:00:00 AM EST tablet    60           

       TAKE ONE TABLET BY MOUTH 

EVERY 12 HOURS AS NEEDED FOR PAIN MAXIMUM DAILY DOSE = 2 TABLETS TAKE ONE TABLET

BY MOUTH EVERY 12 HOURS AS NEEDED FOR PAIN MAXIMUM DAILY DOSE = 2 TABLETS SOLD: 

2020                                                      [facility]

 

          15 mg               2020 12:00:00 AM EST tablet extended release

 90                  TAKE ONE TABLET 

BY MOUTH EVERY 8 HOURS MAXIMUM DAILY DOSE = THREE TABLETS TAKE ONE TABLET BY 

MOUTH EVERY 8 HOURS MAXIMUM DAILY DOSE = THREE TABLETS SOLD: 2020         

                               

[facility]

 

           mg           2020 12:00:00 AM EST tablet    60           

       TAKE ONE TABLET BY MOUTH 

EVERY 12 HOURS AS NEEDED FOR PAIN MAXIMUM DAILY DOSE = TWO TABLETS TAKE ONE 

TABLET BY MOUTH EVERY 12 HOURS AS NEEDED FOR PAIN MAXIMUM DAILY DOSE = TWO 
TABLETS      SOLD: 2020                                        [facility]

s

 

           mg           2020 12:00:00 AM EST tablet    60           

       TAKE ONE TABLET BY MOUTH 

EVERY 12 HOURS AS NEEDED FOR PAIN MAXIMUM DAILY DOSE = 2 TABLETS TAKE ONE TABLET

BY MOUTH EVERY 12 HOURS AS NEEDED FOR PAIN MAXIMUM DAILY DOSE = 2 TABLETS SOLD: 

2020                                                      [facility]

 

          15 mg               2020 12:00:00 AM EST tablet extended release

 90                  TAKE ONE TABLET 

BY MOUTH EVERY 8 HOURS MAXIMUM DAILY DOSE = 3 TABLETS TAKE ONE TABLET BY MOUTH 

EVERY 8 HOURS MAXIMUM DAILY DOSE = 3 TABLETS SOLD: 2020                   

                     [facility]

 

           mg           2019 12:00:00 AM EST tablet    60           

       TAKE ONE TABLET BY MOUTH 

EVERY 12 HOURS AS NEEDED FOR PAIN MAXIMUM DAILY DOSE = 2 TAKE ONE TABLET BY 

MOUTH EVERY 12 HOURS AS NEEDED FOR PAIN MAXIMUM DAILY DOSE = 2 SOLD: 2019 

         

                                                            [facility]

 

          15 mg               2019 12:00:00 AM EST tablet extended release

 90                  TAKE ONE TABLET 

BY MOUTH EVERY 8 HOURS MAXIMUM DAILY DOSE = 3 TAKE ONE TABLET BY MOUTH EVERY 8 

HOURS MAXIMUM DAILY DOSE = 3 SOLD: 2019                                   

     [facility]



                                                                                
                                                                                
                                                                                
                                                                                
                                  



Insurance Providers

          



             Payer name   Policy type / Coverage type Policy ID    Covered party

 ID Covered 

party's relationship to lopez Policy Lopez             Plan Information

 

          MEDICARE            [id number]           SP                  [id number]

[id number]

 

          [facility] FOR [facility]           [id number]           [facility]                 [id number]

[id number]

 

          MEDICARE            [id number]           SP                  [id number]

A

 

          MEDICARE  C         [id number]           S                   [id number]

[id number]

 

           FOR LIFE O         [id number]           S                   [id number]

[id number]

 

          Medicare  C         [id number]           SELF                [id number]

[id number]

 

           For Life F         [id number]           SELF                [id number]

[id number]

 

          Medicare Dme Medigap Part B [id number]           Self                

[id number]

 

           For Life Commercial [id number]           Family Dependent      

     [id number]

 

          Medicare  Medicare Primary [id number]           Self                6

[id number]

 

           For Life Medigap Part B [id number]           Family Dependent  

         [id number]

 

          Medicare [place] Primary [id number]           Self          

      [id number]

 

          DME Jurisdiction A [facility] C         [id number]           SELF          

      [id number]

 

          Medicare  C         [id number]           SELF                [id number]

[id number]

 

           For Life F         [id number]           SPOUSE              0

[id number]

 

          DME Jurisdiction A [facility] C         [id number]           SELF           

     [id number]

 

          Medicare  C         [id number]           SELF                [id number]

A

 

          Medicare Dme Medigap Part B [id number]           Self                

[id number]

 

           For Life Commercial [id number]           Family Dependent      

     [id number]

 

          Medicare Medicare Primary [id number]           Self                6

[id number]

 

           For Life Medigap Part B [id number]           Family Dependent  

         [id number]

 

          Medicare [place] Medicare Primary [id number]           Self           

     [id number]

 

          Medicare  C         [id number]           SELF                [id number]

A

 

          Medicare Dme Medigap Part B [id number]           Self                

[id number]

 

           For Life Commercial [id number]           Family Dependent      

     [id number]

 

          Medicare  Medicare Primary [id number]           Self                6

[id number]

 

           For Life Medigap Part B [id number]           Family Dependent  

         [id number]

 

          Medicare [place] Medicare Primary [id number]           Self           

     [id number]

 

          [employer]  For Life Medigap Part B [id number]           Family Depende

nt           [id number]

 

          Medicare [place]/NGS Medicare Primary [id number]           Self       

         [id number]

 

           For Life Medigap Part B [id number]           Family Dependent  

         [id number]

 

          Medicare [place] Medicare Primary [id number]           Self           

     [id number]

 

           For Life Medigap Part B [id number]           Family Dependent  

         [id number]

 

          Medicare [place] Medicare Primary [id number]           Self           

     [id number]

 

          WPS  For Life Medigap Part B [id number]           Family Depende

nt           [id number]

 

          Medicare [place]/NGS Medicare Primary [id number]           Self       

         [id number]

 

          MEDICARE  C         [id number]           S                   [id number]

A

 

          [facility]         [id number]           S               

    [id number]

 

          [employer] C         [id number]           S              

     [id number]

 

            [employer] [place]           [id number]           2          

       [id number]

 

          HU[employer]  [place] O         [id number]           P               

    [id number]

 

          HEALTHNET/ AD O         [id number]           P                  

 [id number]

 

           For Life Medigap Part B [id number]           Family Dependent  

         [id number]

 

          Medicare [place] Medicare Primary [id number]           Self           

     [id number]

 

           For Life Medigap Part B [id number]           Family Dependent  

         [id number]

 

          Medicare [place] Medicare Primary [id number]           Self           

     [id number]

 

           For Life Medigap Part B [id number]           Family Dependent  

         [id number]

 

          Medicare [place] Medicare Primary [id number]           Self           

     [id number]

 

           For Life Medigap Part B [id number]           Family Dependent  

         [id number]

 

          Medicare [place] Medicare Primary [id number]           Self           

     [id number]

 

                       [id number]           Spo                 [id number]

 

          MEDICARE            [id number]           Jayleen                 [id number]

A

 

           For Life Medigap Part B                     Family Dependent  

          

 

          Medicare [place] Medicare Primary                     Self            

     

 

           For Life F         [id number]           SPOUSE              [id number]

[id number]

 

          [employer] Medigap Part B                     Family Depe

ndent            

 

            [employer] [place] S         [id number]           P              

     [id number]

 

                              [id number]                               [id number]

A

 

                              [id number]                               [id number]



                                                                                
                                                                                
                                                                                
                                                                                
                                                                                
                                                                                
                                                                                
                                                                                
                              



Surgeries/Procedures

          



             Procedure    Description  Date         Indications  Data Source(s)

 

             Bone Mineral Density Test              2020 12:00:00 AM EDT  

            [facility] ([facility]

 

                                        [facility]15 - Bone 

Dens.Study (Dexa),1 Or More-5 year f/u 

 

             Mammogram                 2020 12:00:00 AM EDT              M

[facility]

 

                                        Xray: 18 - Mammography, Bilateral 



                                                                                
                 



Results

          



                    ID                  Date                Data Source

 

                    [id number]          2021 12:00:00 AM EST [facility]

ology Imaging

 

                                        

________________________________________________________________________________

Teri Dickerson MD            Patient Name: YUSRA DIETZ [id number]  [address]         
          YOB: 1960[place]  [address]              Date of 
Exam: [facility]#: [phone]: 
[phone]______________________________________________________________________

__________EXAM: CHEST (2 VIEW) X-RAYCLINICAL INFORMATION:   Dyspnea.Two views.  
These images were obtained using digital radiography.The latest prior for 
comparison 2013.The interstitial markings are diffusely increased.  This 
represents achange from the prior exam.  There is cardiomegaly which appears to 
haveincreased from the prior exam.  There has been interim development ofpatchy 
opacities in the left lower lobe silhouetting out a portion of thediaphragmatic 
surface of the left lung.  There is no significant change inthe appearance of 
the osseous structures.IMPRESSION:There evidence of cardiomegaly and 
interstitial edema with a left lowerlobe patchy opacity as described above.  The
opacity could represent eitherconcomitant pneumonia or asymmetric pulmonary 
edema.  This should becorrelated clinically with appropriate followup.MEGA Kurtz/Sandra bonds for referring YUSRA DIETZ to our office.   
Electronically Signed - RASTA ROSA DO  21 17:58   









          Name      Value     Range     Interpretation Code Description Data Alla

rce(s) Supporting 

Document(s)

 

                                                                       









                    ID                  Date                Data Source

 

                    [id number] 2021 12:01:00 PM 

[facility] ([facility])









          Name      Value     Range     Interpretation Code Description Data Alla

rce(s) Supporting 

Document(s)

 

           sars-cov-2 negative   negative   normal     Sars-cov-2 [place] ([facility])                           









                    ID                  Date                Data Source

 

                    [id number]               2021 11:56:00 AM EST [facility]









          Name      Value     Range     Interpretation Code Description Data Alla

rce(s) Supporting 

Document(s)

 

                                        SARS coronavirus 2 RdRp gene [Presence] 

in Respiratory specimen by JOE with 

probe detection Not detected                                  [facility]      

 

                                        This lab was ordered by [facility] and reported by [facility]. 









                    ID                  Date                Data Source

 

                    [id number]         2020 04:56:00 PM EST [facility]

 

                                        PATIENT MR#:   [id number] NAME:  Yusra Sauer MDATE OF BIRTH: 

1960REFERRING PHYSICIAN: SOS OutsideEXAM DATE:        2020EXAM: 
LUMBAR SPINE MRI WITHOUT CONTRASTINDICATION: Pain in the neck, mid back and low 
back, chronic..COMPARISON: MRI 1/15/2016TECHNIQUE: MRI scan of the lumbar spine 
was performed on a 1.5T GE Scanner usingvarious scan planes and pulse sequences.
 FINDINGS: The lumbar vertebral bodies are normal in height. No fracture 
ordestructive bone lesion. No lesion seen in the conus.  Benign hemangiomata 
T12and L1 vertebral bodies unchanged.T12-L1: Mild disc bulge.  Moderate anterior
 endplate osteophytes.  1 mmretrolisthesis.  Mild thecal sac compression.  
Slightly worse.L1-2: 2 mm retrolisthesis.  Mild disc bulge.  Mild thecal sac 
compression. Foramina are patent.  Unchanged.L2-3: Unremarkable.L3-4: Broad-
based left central disc protrusion.  Mild posterior displacementleft L4 nerve 
root.  Moderate facet arthropathy and ligamentum flavumhypertrophy. Progressive 
mild to moderate canal stenosis.  Mild stenosis of thelateral recesses and 
foramina.L4-5: Severe facet arthropathy.  Moderate ligamentum flavum 
hypertrophy.  3 mmanterolisthesis.  Disc is uncovered and bulging.  Mild canal 
stenosis.  Mildnarrowing of the lateral recesses and foramina bilaterally.  
Unchanged.L5-S1: Severe facet arthropathy.  4 mm anterolisthesis.  Mild disc 
bulge.  Nocanal stenosis.  Mild bilateral foraminal stenosis.  These findings 
haveprogressed.IMPRESSION:1.  L3-4 left central disc protrusion with mild mass-
effect on the left L4 nerveroot.  This is mildly larger.  There is also 
progressive mild to moderate canalstenosis.2.  L4-5 unchanged severe facet 
arthropathy, grade 1 anterolisthesis and mildstenosis.3.  L5-S1 progressive 
severe facet arthropathy and grade 1 anterolisthesis. Mild bilateral foraminal 
stenosis.Read by:        ROSA GONZALEZTranscribed by: ROSA GONZALEZTranscribed Date: 2020 4:56:21 PMElectronically signed by: ROSA GONZALEZDamorgan signed: 2020 4:57:47 PM 









          Name      Value     Range     Interpretation Code Description Data Alla

rce(s) Supporting 

Document(s)

 

                                                                       









                    ID                  Date                Data Source

 

                    [id number]         2020 04:58:00 PM EST [facility]

 

                                        PATIENT MR#:   [id number] NAME:  Yusra Sauer MDATE OF BIRTH: 

1960REFERRING PHYSICIAN: SOS OutsideEXAM DATE:        2020EXAM: MR 
Thoracic Spine without contrastINDICATION: Thoracic back painCOMPARISON: No 
comparison availableTECHNIQUE: Multiplanar multisequence MR imaging of the 
thoracic spine wasperformed on a 1.5T SeamBLiSS MR unit using various scan planes and 
pulse sequences. Intravenous contrast was not administered.FINDINGS: 3 mm grade 
1 anterolisthesis of C7 on T1 and minimal grade 1anterolisthesis T1 on T2.  The 
thoracic kyphosis is preserved.  The vertebralbody heights are normal.  No 
compression deformity or lumbar fracture.There is a nonaggressive hyperintense 
T1 and T2 signal void 24 mm T12 vertebralbody hemangioma.  Heterogeneous 
appearance of the T2 vertebral body likelyrepresent a large underlying T2 
hemangioma.  Indeterminate 14 mm intermediate T1and T2 signal ovoid T8 vertebral
 body lesion.  Further evaluation andcharacterization with whole body nuclear 
medicine bone scan is advised toexclude aggressive etiology.Multilevel chronic 
thoracic disc desiccation, with multilevel mild to moderatedegenerative ventral 
endplate osteophytosis.The thoracic cord is normal in signal and morphology.  
The conus is normal andterminates at L1 superior endplate level.T1-2: Shallow 
dorsal disc bulge mildly flattens the ventral thecal sac.  Nocentral canal 
stenosis.  Mild left and moderate right foraminal stenosisbilateral facet 
arthropathy.T2-3: Shallow dorsal disc bulge.  No central canal or foraminal 
stenosis.T9-10: Ligament of flavum thickening and bilateral facet arthropathy p
artiallyefface the dorsal CSF signal although no significant central canal 
stenosis orlateral recess narrowing.  No foraminal stenosis.T10-11: Right 
greater than left facet arthropathy with asymmetric ligament theplane thickening
 effaces the dorsal CSF signal and mildly abuts and slightlyflatten the right 
dorsal cord.  Mild central canal stenosis.  No foraminalnarrowing.T11-12: 
Ligament of flavum thickening and bilateral facet arthropathy effacesthe dorsal 
CSF signal with mild to moderate central canal stenosis.  Mild dorsalcord 
abutment and slight dorsal cord flattening are seen.  Moderate leftforaminal 
stenosis.T12-L1: Mild diffuse disc bulge flattens the ventral thecal sac with 
mildluminal thickening and bilateral facet arthropathy.  Mild central 
canalstenosis.  No foraminal narrowing.No paraspinal soft tissue of 
normality.IMPRESSION: 1.  Mild multilevel thoracic spondylosis with mild T12-L1 
disc bulge and minimaldisc bulges at T1-2 and T2-3.  Mild T12-L1 central canal 
narrowing.  Multilevelligamentum flavum thickening and facet arthropathy 
contribute to additional [id number] canal narrowing and mild to moderate T11-12 
central canal stenosis, withmild dorsal cord flattening and abutment.2.  Mild to
 moderate T1-2 bilateral foraminal stenosis.  Moderate left [id number] 
stenosis.3.  Indeterminate intermediate signal T8 vertebral body intramedullary 
lesion. Further characterization and evaluation with whole body nuclear medicine
 bonescan is advised to exclude aggressive etiology.Read by:        Supa GreeneTranscribed by: Supa MenendezorsTranscribed Date: 2020 4:58:31 
PMElectronically signed by: Supa GreeneDate signed: 2020 4:59:39 PM 









          Name      Value     Range     Interpretation Code Description Data Alla

rce(s) Supporting 

Document(s)

 

                                                                       









                    ID                  Date                Data Source

 

                    [id number]         2020 03:55:00 PM EST [facility]

 

                                        PATIENT MR#:   [id number] NAME:  Yusra Sauer MDATE OF BIRTH: 

1960REFERRING PHYSICIAN: SOS OutsideEXAM DATE:        2020EXAM: MRI 
CERVICAL SPINE INDICATION: C5-6 fusion 2015.  Chronic neck pain, mid back 
pain and lowerback pain.COMPARISON: X-rays 2015, MRI 2015TECHNIQUE: MRI
 scan was performed on a 1.5T GE Scanner using various scan planesand pulse 
sequences.FINDINGS: The visualized vertebral bodies are normal in height.  No 
acutefracture benign hemangioma T2 vertebral body unchanged.  C2-3: Moderate 
facet arthropathy.  No disc herniation or stenosis.C3-4: Mild broad-based 
central disc protrusion.  Moderate thecal saccompression.  Mild indentation left
 ventral spinal cord.  This is mildly worse. Severe right facet arthropathy with
 fusion of the facet joint.  Unchanged.C4-5: Central disc protrusion.  Bilateral
 uncinate osteophytes.  Moderate facetarthropathy.  Moderate indentation of the 
ventral spinal cord.  CSF iscompletely effaced around the cord.  Severe bilater
al foraminal stenosis.  Thesefindings are worse.  The cord compression is 
new.C5-6: Interval fusion.  Central bony ridge with bilateral uncinate 
osteophytes. Moderate thecal sac compression with mild indentation of the 
ventral spinalcord.  There is a thin rim of CSF around the posterior cord.  The 
degree of cordcompression and stenosis has improved compared to the previous 
study.  Severebilateral foraminal stenosis unchanged.  Small focus of increased 
T2 signal inthe left lateral spinal cord getting myelomalacia.  This is 
unchanged.C6-7: Disc bulge with bilateral disc osteophyte complexes.  Mild fac
etarthropathy.  Mild canal stenosis.  No cord compression.  Moderate 
bilateralforaminal stenosis.C7-T1: Moderate facet arthropathy.  2 mm 
anterolisthesis.  Canal and foraminaare patent.  These findings are mildly 
worse.IMPRESSION: 1.  C3-4 broad-based left central disc protrusion with mild 
cord compression. Worse.2.  C4-5 progressive central disc protrusion and facet 
arthropathy withworsening stenosis and cord compression.3.  C5-6 discectomy and 
fusion.  Stenosis and cord compression improved. Unchanged severe bilateral 
foraminal stenosis and subtle myelomalacia.4.  C6-7 unchanged mild canal stenosi
s and moderate foraminal stenosis.Read by:        ROSA GONZALEZTranscribed 
by: ROSA GONZALEZTranscribed Date: 2020 3:55:33 PMElectronically signed
 by: ROSA GONZALEZDate signed: 2020 3:55:52 PM 









          Name      Value     Range     Interpretation Code Description Data Alla

rce(s) Supporting 

Document(s)

 

                                                                       









                    ID                  Date                Data Source

 

                    [id number]            12/10/2020 01:47:28 PM EST [facility] [place] [facility]

[facility] PCName: Kandis ReynoldsOB: 1960Provider: Loulou Brown: 2020 Chief ComplaintPatient presents with low back pain  Chief 
Complaint 2Neck and mid back pain, too. [facility] VAS PAIN Established:  MA 
completing section: shall CNA   History of Present IllnessDo you have a Brace 
for your condition? Patient does not have a brace for their condition. Do you 
have a TENS Unit for your condition? Patient does not have a TENS Unit for their
 condition. Supplements: Patient is not currently taking any supplements. Nerve 
Conduction: Patient has had a Nerve Conduction Test. Recent test/procedures: 
Patient was asked and denies having any tests since their last visit. Patient 
was asked and denies being seen by any Physicians since their last visit. The 
patient was last seen by a [facility] provider on 10/27/2020. 
At today's visit patient presents with their Self Implanted Devices The patient 
does not have any implanted devices. The patient does not have a glucose 
monitoring device.  Patient is not currently working. What was patient's 
previous occupation? . The patient is being seen for a post block
 examination.       Pain Duration: Years   Pain Quality: (Nociceptive)  sharp 
Timing:  intermittent. Progression:  improving Palliation:  block, changing 
position, opioid analgesics, rest and resting/laying down Exacerbating:  lifting
 Pain Score:  a current pain level of 5/10, a minimum pain level of 4/10 and a 
maximum pain level of 9/10. Condition type: The patient is being seen for a 
chronic condition. PAIN LOCATION:  the pain is located in the neck,  radiates to
 the left shoulder, left elbow, left forearm, left wrist, left hand and middle 
finger finger, the pain is greater on the left side more than the right, the 
pain is located in the low back ,  (weakness , achy and throbbing pain) radiates
 to the right buttock, left buttock, right hip, left hip, right thigh, right 
knee, right calf/shin, right ankle, right big toe and right pinky toe, the pain 
is greater on the right side more than the left, the pain is in the leg equally 
with the back, the pain is located in the mid-back ,  radiates to the right side
 of the ribs and left side of the ribs and the pain is greater on the left side 
more than the right. The etiology of this injury/condition is unknown. 
RELATIONSHIP TO INJURY: This condition is not related to a specific injury. 
INJURY MECHANISM: The injury resulted from  no known physical event. REVIEW OF 
PAST DIAGNOSTICS: have included:  MRI and electromyography/nerve conduction 
studies . Records were obtained, reviewed and on file. PAST TREATMENT has 
included:  NONSTEROIDAL ANTI-INFLAMMATORY drugs (not effective) Includes Mobic.,
 but ANTICONVULSANTS (effective) Includes Lyrica., OPIOID ANALGESICS (effective)
 Includes. hydrocodone does not work. fentanyl patch seems to be working well., 
cervical surgery 2015 (effective). - Radio Frequency Pertinent Information:
 On a RADIO FREQUENCY ABLATION of the BILATERAL, LUMBAR FACET JOINTS under 
fluoroscopy as confirmed by previous successful medial branch nerve blocks. 
Targeting the.  80 % of pain relief was provided which is ongoing. Allows 
patient to increase activity and functionality Including the following 
activities for longer periods of time with less pain: activities of daily 
living, better sleep, walking, standing, sitting and grocery shop. And to 
decrease the following pain medication(s): 11-, 2020 DR. RASHID. She 
is doing better overall the low back pain is lessened she can be more active. 
INTERVAL EVENTS: include . She states that the RF procedure helped the low back 
pain. She is having more mid back pain and it radiates into her ribs. She is now
 having a lot of mid back and some neck pain...she had neck surgery in 2015. She
 wants to know why the mid back is hurting her and has done no diagnostics on 
the neck for a long time. ASSOCIATED SYMPTOMS: include difficulty sleeping , 
difficulty walking, muscle pain/spasm, radiating, extremity weakness:  left, lo
wer extremity, right. and urinary incontinence, but no fecal incontinence. 
FUNCTIONAL LIMITATIONS: The patient's functional status is limited as follows: 
ability to  perform activities of daily living, participate in aerobic activity,
 participate in hobbies, perform housework and maintain normal sleep pattern. 
MEDICATION SIDE EFFECTS experienced by patient are:  drowsiness.   Review of 
SystemsConstitutional: Normal. Eyes: Normal. ENT: normal. Cardiovascular: 
Normal. Respiratory: Normal. Gastrointestinal: Normal. Genitourinary: Normal. 
Musculoskeletal: lower back pain, midback pain, neck pain, upper back pain, limb
 pain and joint pain. Integumentary: Normal. Neurological: Normal. Psychiatric: 
Normal. Endocrine: Normal. Hematologic/Lymphatic: Normal.   Patient maintains at
 today's visit there has been no change in his/her hematologic history.   I 
reviewed the above with the patient and I feel the ROS to be negative/normal 
other than mid and low back pain, neck, limb and joint pain.   Active Problems 
1. Chronic hip pain, left (719.45,338.29) (M25.552,G89.29) 2. Chronic low back 
pain (724.2,338.29) (M54.5,G89.29) 3. Facet arthropathy, lumbar (721.3) 
(M47.816) 4. History of depression (V11.8) (Z86.59) 5. History of fibromyalgia 
(V13.59) (Z87.39) 6. History of osteoarthritis (V13.4) (Z87.39) 7. Long term 
current use of opiate analgesic (V58.69) (Z79.891) 8. Lumbar radiculopathy 
(724.4) (M54.16) 9. Lumbar spondylosis (721.3) (M47.816) 10. Muscle spasm of 
back (724.8) (M62.830) 11. Osteoarthritis of hip (715.95) (M16.9) 12. Primary 
osteoarthritis of hip (715.15) (M16.10) 13. Spondylolisthesis, lumbar region 
(738.4) (M43.16) Allergies  Morphine Derivatives Itching; Updated By: 
Geno Zapata; 2018 3:14:52 PMonly iv morphineDenied   Adhesive Tape 
Recorded By: Alesha Hollis; 2016 12:56:08 PM  Iodinated Contrast Media 
Recorded By: Alesha Hollis; 2016 12:56:08 PM  Latex Recorded By: Alesha Hollis; 2016 12:56:08 PM Current Meds  Aleve-D Sinus  Cold TB12;Therapy: 
(Recorded:26Wif7168) to Recordedld 10/9/17 am  Aspirin  MG Oral Tablet 
Delayed Release; take one pill po every day;Therapy: 46Vry7308 to 
(Evaluate:89Imf5822) Recordedself prescribed  Cyclobenzaprine HCl - 10 MG Oral 
Tablet; 1 po TID prn spasms MDD 3;Therapy: 49Hgs6606 to (Evaluate:35Dkh1820)  
Requested for: 64Zca1255; LastRx:32Dmw8923 Ordered  Lexapro 20 MG Oral 
Tablet;Therapy: (Recorded:60Yja2527) to Recorded  Magnesium Oxide 400 MG Oral 
Capsule;Therapy: 52Mtp4828 to Recorded  Minocycline HCl CAPS;Therapy: 
(Recorded:52Jbo9483) to Recorded  Morphine Sulfate ER 15 MG Oral Tablet Extended
 Release; TAKE 1 TABLET EVERY 8HOURS MDD:3;Therapy: 52Vvv5840 to 
(Evaluate:17Udz4334)  Requested for: 2020; LastRx:78Dbi2859 OrderedLD 
2020 3:00am  oxyCODONE-Acetaminophen  MG Oral Tablet; TAKE 1 TABLET 3
 times daily PRNPAIN MDD:3;Therapy: 45Zoo6374 to (Evaluate:61Qza4364)  Requested
 for: 2020; LastRx:18Yut3360 OrderedLD 2020 10:00am  Potassium 
Gluconate 595 (99 K) MG Oral Tablet;Therapy: (Recorded:2016) to Recorded  
Protonix 40 MG Oral Tablet Delayed Release;Therapy: 2018 to Recorded  
Wellbutrin TABS;Therapy: (Recorded:2018) to Recorded Past Medical History  
History of Chronic headaches (784.0) (R51)  Denied: History of blood coagulation
 disorder  History of degenerative disc disease (V13.59) (Z87.39)  History of 
depression (V11.8) (Z86.59)  History of fibromyalgia (V13.59) (Z87.39)  Assessed
 By: Arabella Brown (Pain Management); Last Assessed: 2017  History of 
hepatitis A virus infection (V12.09) (Z86.19)  History of kidney stones (V13.01)
 (Z87.442)  History of osteoarthritis (V13.4) (Z87.39)  Assessed By: Arabella Brown (Pain Management); Last Assessed: 2017  History of small bowel 
obstruction (V12.79) (Z87.19)    History of Not currently pregnant (V49.89) 
(Z78.9)  Denied: History of On anticoagulant therapy Surgical History  History 
of Appendectomy  3/2013  History of Back Surgery  2015    C5 disk replacement 
 History of Gastric Surgery For Morbid Obesity  2006  History of Hernia Repair  
2013  History of Hip Replacement Left  History of Hysterectomy  3/2004  
Denied: History of Implantable Cardioverter-Defibrillator  History of Knee 
Arthroplasty  History of Knee Replacement  2005  History of Knee Surgery  
2015  Denied: History of Pacemaker Placement  History of Total Hip Replacement
   Family History  Family history of arthritis (V17.7) (Z82.61)  Family 
history of cardiac disorder (V17.49) (Z82.49)  Family history of cardiac 
disorder (V17.49) (Z82.49)  Family history of diabetes mellitus (V18.0) (Z83.3) 
 Family history of malignant neoplasm (V16.9) (Z80.9) Social History  Current 
non-drinker of alcohol (V49.89) (Z78.9)  Denied: History of Drug use    
Never a smoker  Not currently employed VitalsVital Signs Recorded: 08Iut5112 
03:33PM Height: 5 ft 7 inWeight: 215 lb BMI Calculated: 33.67BSA Calculated: 
2.09Systolic: 128, SittingDiastolic: 79, SittingHeart Rate: 117Respiration: 
16Temperature: 96.8 F, TympanicHeight measured w/wo shoes: w/shoesPain Scale: 5 
Physical ExamGeneral: The patient is a well nourished/well developed, female, 
heavy set, who is in no acute distress and appears stated age. Eyes: Lids are 
atraumatic, no lesions, sclerae are anicteric. Ears, Nose, Mouth, Throat: 
Patient wearing a mask due to COVID-19. Respiratory: Normal chest expansion and 
respiratory effort. Gait and Station: Gait was normal. Cardiovascular: 
Extremities without peripheral edema.Cervical Spine: Surgical incision, well 
healed surgical scar.- Palpation/Tenderness: Tenderness on palpation of the 
left: negative left paraspinal and negative left trapezius muscle. Tenderness on
 palpation of the RIGHT: negative right paraspinal and negative right trapezius 
muscle. - ROM:. Range of motion increases pain.Right Upper Extremity Motor: - 
Wrist: 5/5 flexion, 5/5 extension- Elbow: 5/5 flexion, 5/5 extension- Shoulder: 
5/5 Abduction, 5/5 Adduction- Hand  5/5 Left Upper Extremity Motor:- Wrist: 
5/5 flexion, 5/5 extension- Elbow: 5/5 flexion, 5/5 extension- Shoulder: 5/5 
Abduction, 5/5 Adduction- Hand  5/5 Neurological:Sensation Upper:. 
Cardiovascular: Extremities without peripheral edema.Thoracic Spine: Inspection:
 No deformity, ecchymosis, erythema or swelling noted.Palpation/Tenderness: 
Tenderness on palpation of the LEFT: paraspinal . Tenderness on palpation of the
 RIGHT: negative right paraspinal. ROM:  Range of motion increases pain. 
Flexion: was painful. Extension: was painful. Lumbosacral Spine: - Inspection: 
normal appearance. No deformity, ecchymosis, erythema or swelling noted. Normal 
Lordosis.. - Palpation/Tenderness: Tenderness on palpation of the LEFT: negative
 left paraspinal, negative left sciatic notch and negative left sacroiliac 
joint. - Palpation/Tenderness: Tenderness on palpation of the RIGHT: negative 
right paraspinal, negative right sciatic notch and negative right sacroiliac 
joint. - ROM Flexion: was not restricted, was painless. - ROM Extension: was not
 restricted, was painless.Right Lower Extremity Motor:- Hip: 5/5 flexion, 5/5 
extension- Knee: 5/5 flexion, 5/5 extension- Foot: 5/5 Plantar , 5/5 
DorsiFlexionSpecial Tests: flip test was negative, negative facet challenge and 
negative faberLeft Lower Extremity Motor:- Hip: 5/5 flexion, 5/5 extension- 
Knee: 5/5 flexion, 5/5 extension- Foot: 5/5 Plantar , 5/5 DorsiFlexionSpecial 
Tests: flip test was negative, negative facet challenge and positive 
faberNeurological:. Skin: Warm, dry, acyanotic. Psychological: Alert and 
oriented to person, place and time. Mood and affect are pleasant and 
appropriate. Judgement intact. Insight normal without delusions or 
hallucinations.  Denies suicidal/homicidal ideation.   Results/DataThis patient 
had a urine drug screen on 10-9-2020. Results are not in compliance. Patient 
counselled on not over taking short acting pain meds for exacerbation of pain   
Assessment 1. Back pain, thoracic (724.1) (M54.6) 2. Thoracic radiculopathy 
(724.4) (M54.14) 3. Chronic low back pain (724.2,338.29) (M54.5,G89.29) 4. 
Lumbar radiculopathy (724.4) (M54.16) 5. Neck pain (723.1) (M54.2) Plan 1. 
Changed: From  oxyCODONE-Acetaminophen  MG Oral Tablet take 1po  twice 
daily prn pain MDD:2 To oxyCODONE-Acetaminophen  MG Oral Tablet take 1po 
twice daily prn pain MDD:2LD 2020 10:00am 2. Renew: Morphine Sulfate ER 15 
MG Oral Tablet Extended Release; TAKE 1 TABLET EVERY 8 HOURS MDD:3LD 2020 
3:00am 3. MRI ([facility]) Referral Treatment  Treatment  Status: Hold For - 
Scheduling  Requested for: 84Moo2933Qzlxuyu Type 3 : Private (No Auth needed)MRI
 Body Part 3 : C-SpineRequest Type 2 : Private (No Auth needed)Laterality (Left,
 Right, Bilateral) 2 : _NAMRI Body Part 2 : T-SpineDoes Patient have SCS...? : 
NoFront Desk Reminder: : Schedule FMRI within 7 days with Pt's MLP on fileIs 
this to rule out a mass? : NoHas the patient had Lumbar surgery? : NoContrast : 
Without ContrastRequest Type : Private (No Auth needed)MRI Reminder : Ins may 
require patient to have 4-6 wks of recent/documented PT on    body partMRI Body 
Part : L-SpineIs this for a MILD MRI...? : No 4. 30 Day RX Management  Follow-up
  Status: Hold For - Scheduling  Requested for: 2020). Schedule (FUP) 60 
days from today: : 2020). Is an additional appointment needed....? : Yes). 
Schedule 30 day RX from TODAY's date (2 days +/- either way): : 2021 In my 
opinion based on subjective and objective findings from today's visit the 
patient is improving. Medication:.  [place]  Information:  was consulted by my
 designee and I have reviewed the information presented to me and find no 
aberrant compliance issues. Patient has been informed.  Controlled Substance 
Prescribed: MORPHINE ER, PERCOCET . CONTROLLED SUBSTANCE INFORMATION: I advised 
the patient today/previously regarding treatment with the above controlled 
substance and/or narcotic. The patient was then informed of the risks, benefits,
 and alternatives of the narcotic. The risks discussed included but were not 
limited to physical and/or psychological dependence, tolerance of the 
medication, drowsiness, sleepiness, balance/coordination problems, confusion, 
allergic reaction or any other abnormal symptoms. The patient was advised and 
agreed to use the medication only as prescribed, and not to drive, or operate 
heavy equipment or machinery. The patient understood and consented to both 
undergo a narcotic/opiate regimen and agreed to sign and comply with all of the 
[facility] terms of a controlled substance treatment 
and agreement. The patient is on the lowest possible dose of opioids. The 
patient denies adverse side effects and does not demonstrate any aberrant drug 
taking behaviors. The patient is able to perform ADL's  including the following 
activities for longer periods of time with less pain: activities of daily 
living, sleeping, walking, standing, sitting, grocery shopping . There are no 
changes in current medications at this visit. Patient instructed to continue 
current regimen.  The patient is on the lowest possible dose of opioids. The 
patient denies adverse side effects and does not demonstrate any aberrant drug 
taking behaviors. The patient is able to perform ADL's  including the following 
activities for longer periods of time with less pain: activities of daily 
living, sleeping, walking, standing, sitting, grocery shopping . The prescribed 
medications are medically necessary for pain management and rehabilitation. 
Treatment includes: PROCEDURE(S): Blocks/procedures are deferred until further 
diagnostic studies are complete THERAPIES: Therapy Treatment Plan: Deferred: All
 Therapies: Deferred: per patient request. DIAGNOSTIC STUDIES: Based on patients
 symptoms and physical exam findings, I am ordering the following diagnostic 
tests: - MRI Variance/Authorization Request: We are requesting authorization 
(Private) for a MRI - MRI: I am ordering an UPDATED MRI, of the CERVICAL SPINE, 
of the LUMBAR SPINE, of the THORACIC SPINE, the original MRI was done on LUMBAR 
MRI . I will call patient with results. FOLLOW UP: The patient should have a
 follow up 30 Day RX. CONTINUE TREATMENT: Yusra will continue with the 
following:. Yusra is participating in a home exercise program and is encouraged 
to continue. - : The patient was counseled on the following:  treatment 
plan and future treatment options.        Discussion/SummaryYusra is seen for low
 back pain. The low back is much improved after the lumbar facet RF.  She is ha
ving some mid back pain and is worried about past cervical surgery and wonders 
if anything might be deteriorating with this. I have requested updated MRI of 
her entire spine. She is able to reduce her pain meds now that the low back is 
better. She will be seen in a month.   Signatures Electronically signed by : 
Arabella Brown NP; Dec  9 2020  4:31PM EST                       (Author)  
Electronically signed by : Fabricio Camacho MD; Dec 10 2020  1:47PM EST             
          









          Name      Value     Range     Interpretation Code Description Data Alla

rce(s) Supporting 

Document(s)

 

                                                                       









                    ID                  Date                Data Source

 

                    [id number]       2020 12:00:00 PM EST [facility]









          Name      Value     Range     Interpretation Code Description Data Alla

rce(s) Supporting 

Document(s)

 

                                        SARS coronavirus 2 RNA [Presence] in Res

piratory specimen by JOE with probe 

detection                                              [facility]      

 

                                        This lab was ordered by [facility] and reported by 

[employer]. 









                    ID                  Date                Data Source

 

                    [id number]       2020 12:00:00 PM EST [facility]









          Name      Value     Range     Interpretation Code Description Data Alla

rce(s) Supporting 

Document(s)

 

                                        SARS coronavirus 2 RNA [Presence] in Res

piratory specimen by JOE with probe 

detection                                              [facility]      

 

                                        This lab was ordered by [facility] [employer] 

and reported by [employer]. 









                    ID                  Date                Data Source

 

                    [id number]            10/28/2020 08:57:23 AM EDT [facility]

[facility]

 

                                        [facility], PCName: Kandis DoshisDOB: 1960Provider: Loulou Brown: 10/27/2020 Chief ComplaintPatient presents with neck and low back pain
  Chief Complaint 2[place] VAS PAIN Established:  MA completing section: KPrice CMA
   History of Present IllnessDo you have a Brace for your condition? Patient 
does not have a brace for their condition. Do you have a TENS Unit for your 
condition? Patient does not have a TENS Unit for their condition. Supplements: 
Patient is not currently taking any supplements. Nerve Conduction: Patient has 
had a Nerve Conduction Test. Recent test/procedures: Patient was asked and 
denies having any tests since their last visit. Patient was asked and denies 
being seen by any Physicians since their last visit. The patient was last seen 
by a [facility] provider on 10/09/2020. At today's visit 
patient presents with their Self Implanted Devices The patient does not have any
 implanted devices. The patient does not have a glucose monitoring device.  
Patient is not currently working. The patient is being seen for a follow-up.    
   Pain Duration: 35 years   Pain Quality: (Nociceptive)  aching, dull and sharp
 Timing:  constant Progression:  worsening Palliation:  block, changing 
position, non-opioid analgesics and opioid analgesics Exacerbating:  sitting, 
standing and walking Pain Score:  a current pain level of 7/10, a minimum pain 
level of 5/10 and a maximum pain level of 10/10. Condition type: The patient is 
being seen for a chronic condition. PAIN LOCATION:  the pain is located in the 
low back ,  (weakness , achy and throbbing pain) radiates to the right buttock, 
left buttock, right hip, left hip, right thigh, right knee, right calf/shin, 
right ankle, right big toe and right pinky toe, the pain is greater on the right
 side more than the left and the pain is in the leg equally with the back. The 
etiology of this injury/condition is unknown. RELATIONSHIP TO INJURY: This 
condition is not related to a specific injury. INJURY MECHANISM: The injury 
resulted from  no known physical event. REVIEW OF PAST DIAGNOSTICS: have 
included:  MRI and electromyography/nerve conduction studies . Records were 
obtained, reviewed and on file. PAST TREATMENT has included:  NONSTEROIDAL ANTI-
INFLAMMATORY drugs (not effective) Includes Mobic., but ANTICONVULSANTS 
(effective) Includes Lyrica., OPIOID ANALGESICS (effective) Includes. 
hydrocodone does not work. fentanyl patch seems to be working well., cervical 
surgery 2015 (effective). - Radio Frequency Pertinent Information: On a 
RADIO FREQUENCY ABLATION of the BILATERAL, LUMBAR FACET JOINTS under fluoroscopy
 as confirmed by previous successful medial branch nerve blocks. Targeting the. 
 80 % of pain relief was provided lasting for 10 month(s). Allows patient to 
increase activity and functionality Including the following activities for 
longer periods of time with less pain: activities of daily living, better sleep,
 walking, standing, sitting and grocery shop. And to decrease the following pain
 medication(s): 19, 19 DR. RASHID. Good reduction of pain in the low 
back and able to walk better. This has complete worn off and she would like to 
get it repeated. INTERVAL EVENTS: include . She is having a lot of pain and does
 admit to needing more pain meds so she did run out of the short acting pain 
meds. She was counseled today about this and will do a block to get on top of 
this. ASSOCIATED SYMPTOMS: include difficulty sleeping , difficulty walking, 
muscle pain/spasm, radiating, extremity weakness:  left, lower extremity, right.
 and urinary incontinence, but no fecal incontinence. FUNCTIONAL LIMITATIONS: 
The patient's functional status is limited as follows: ability to  perform 
activities of daily living, participate in aerobic activity, participate in 
hobbies, perform housework and maintain normal sleep pattern. MEDICATION SIDE 
EFFECTS experienced by patient are:  drowsiness.   Review of 
SystemsConstitutional: Normal. Eyes: Normal. ENT: normal. Cardiovascular: 
Normal. Respiratory: Normal. Gastrointestinal: Normal. Genitourinary: Normal. 
Musculoskeletal: lower back pain, joint pain and limb pain. Integumentary: 
Normal. Neurological: Normal. Psychiatric: Normal. Endocrine: Normal. Hema
tologic/Lymphatic: Normal.   Patient maintains at today's visit there has been 
no change in his/her hematologic history.   I reviewed the above with the 
patient and I feel the ROS to be negative/normal other than low back and joint 
pain.   Active Problems 1. Chronic hip pain, left (719.45,338.29) 
(M25.552,G89.29) 2. Chronic low back pain (724.2,338.29) (M54.5,G89.29) 3. Facet
 arthropathy, lumbar (721.3) (M47.816) 4. History of depression (V11.8) (Z86.59)
 5. History of fibromyalgia (V13.59) (Z87.39) 6. History of osteoarthritis 
(V13.4) (Z87.39) 7. Long term current use of opiate analgesic (V58.69) (Z79.891)
 8. Lumbar radiculopathy (724.4) (M54.16) 9. Lumbar spondylosis (721.3) 
(M47.816) 10. Muscle spasm of back (724.8) (M62.830) 11. Osteoarthritis of hip 
(715.95) (M16.9) 12. Primary osteoarthritis of hip (715.15) (M16.10) 13. 
Spondylolisthesis, lumbar region (738.4) (M43.16) Allergies  Morphine 
Derivatives Itching; Updated By: Geno Zapata; 2018 3:14:52 PMonly iv 
morphineDenied   Adhesive Tape Recorded By: Alesha Hollis; 2016 12:56:08 
PM  Iodinated Contrast Media Recorded By: Alesha Hollis; 2016 12:56:08 PM 
 Latex Recorded By: Alesha Hollis; 2016 12:56:08 PM Current 
Meds<OBX.5.1><OBX.5.1.1>  Aleve-D Sinus </OBX.5.1.1><OBX.5.1.2> Cold 
TB12;</OBX.5.1.2></OBX.5.1>Therapy: (Recorded:2017) to Recordedld 10/9/17 
am  Aspirin  MG Oral Tablet Delayed Release; take one pill po every 
day;Therapy: 68Alt9524 to (Evaluate:2018) Recordedself prescribed  
Cyclobenzaprine HCl - 10 MG Oral Tablet; 1 po TID prn spasms MDD 3;Therapy: 
58Yao4978 to (Evaluate:22Vtw2600)  Requested for: 53Qpa8405; LastRx:18Fha0528 
Ordered  Lexapro 20 MG Oral Tablet;Therapy: (Recorded:87Gsx2017) to Recorded  
Magnesium Oxide 400 MG Oral Capsule;Therapy: 66Eeo3074 to Recorded  Minocycline 
HCl CAPS;Therapy: (Recorded:56Dfc4051) to Recorded  Morphine Sulfate ER 15 MG 
Oral Tablet Extended Release; TAKE 1 TABLET EVERY 8HOURS MDD:3;Therapy: 
44Bde2808 to (Evaluate:2020)  Requested for: 2020; LastRx:2020 
OrderedLD 10/27/2020  oxyCODONE-Acetaminophen  MG Oral Tablet; TAKE 1 
TABLET 3 times daily PRNPAIN MDD:3;Therapy: 06Ulw9586 to (Evaluate:2020)  
Requested for: 2020; LastRx:2020 OrderedLD 10/27/2020  Potassium 
Gluconate 595 (99 K) MG Oral Tablet;Therapy: (Recorded:2016) to Recorded  
Protonix 40 MG Oral Tablet Delayed Release;Therapy: 2018 to Recorded  
Wellbutrin TABS;Therapy: (Recorded:2018) to Recorded Past Medical History  
History of Chronic headaches (784.0) (R51)  Denied: History of blood coagulation
 disorder  History of degenerative disc disease (V13.59) (Z87.39)  History of 
depression (V11.8) (Z86.59)  History of fibromyalgia (V13.59) (Z87.39)  Assessed
 By: Arabella Brown (Pain Management); Last Assessed: 2017  History of 
hepatitis A virus infection (V12.09) (Z86.19)  History of kidney stones (V13.01)
 (Z87.442)  History of osteoarthritis (V13.4) (Z87.39)  Assessed By: Arabella Brown (Pain Management); Last Assessed: 2017  History of small bowel 
obstruction (V12.79) (Z87.19)    History of Not currently pregnant (V49.89) 
(Z78.9)  Denied: History of On anticoagulant therapy Surgical History  History 
of Appendectomy  3/2013  History of Back Surgery  2015    C5 disk replacement 
 History of Gastric Surgery For Morbid Obesity    History of Hernia Repair  
2013  History of Hip Replacement Left  History of Hysterectomy  3/2004  
Denied: History of Implantable Cardioverter-Defibrillator  History of Knee 
Arthroplasty  History of Knee Replacement  2005  History of Knee Surgery  
2015  Denied: History of Pacemaker Placement  History of Total Hip Replacement
   Family History  Family history of arthritis (V17.7) (Z82.61)  Family 
history of cardiac disorder (V17.49) (Z82.49)  Family history of cardiac dis
order (V17.49) (Z82.49)  Family history of diabetes mellitus (V18.0) (Z83.3)  
Family history of malignant neoplasm (V16.9) (Z80.9) Social History  Current 
non-drinker of alcohol (V49.89) (Z78.9)  Denied: History of Drug use    
Never a smoker  Not currently employed VitalsVital Signs Recorded: [id number] 
03:18PM Height: 5 ft 6 inWeight: 214 lb BMI Calculated: 34.54BSA Calculated: 
2.06Systolic: 132, SittingDiastolic: 90, SittingHeart Rate: 73Respiration: 
16Temperature: 97.4 FPain Scale: 7 Physical ExamGeneral: female, heavy set, who 
is in mild distress and appears stated age. Eyes: Lids are atraumatic, no 
lesions, sclerae are anicteric. Ears, Nose, Mouth, Throat: Patient wearing a 
mask due to COVID-19. Respiratory: Normal chest expansion and respiratory 
effort. Gait and Station: Gait was normal. Cardiovascular: Extremities without 
peripheral edema.Lumbosacral Spine: - Palpation/Tenderness: Tenderness on 
palpation of the LEFT: paraspinal , but negative left sciatic notch and negative
 left sacroiliac joint. - Palpation/Tenderness: Tenderness on palpation of the 
RIGHT: paraspinal , but negative right sciatic notch and negative right 
sacroiliac joint. - ROM Flexion: was not restricted, was painless. - ROM 
Extension: was restricted, was painful.Right Lower Extremity Motor: - Hip: 5/5 
flexion, 5/5 extension- Knee: 5/5 flexion, 5/5 extension- Foot: 5/5 Plantar , 
5/5 DorsiFlexionLeft Lower Extremity Motor:- Hip: 5/5 flexion, 5/5 extension- 
Knee: 5/5 flexion, 5/5 extension- Foot: 5/5 Plantar , 5/5 
DorsiFlexionNeurological:Sensation Left Lower: normalSensation Right Lower: 
normal. Skin: Warm, dry, acyanotic. Psychological: Alert and oriented to person,
 place and time. Mood and affect are pleasant and appropriate. Judgement intact.
 Insight normal without delusions or hallucinations.  Denies suicidal/homicidal 
ideation.   Results/DataThis patient had a urine drug screen on . Results are 
not in compliance. She was counseled her pain was worse and she did take more of
 the oxycodone.   Assessment 1. Chronic low back pain (724.2,338.29) 
(M54.5,G89.29) 2. Facet arthropathy, lumbar (721.3) (M47.816) 3. Lumbar 
radiculopathy (724.4) (M54.16) 4. Lumbar spondylosis (721.3) (M47.816) Plan 1. 
Renew: Morphine Sulfate ER 15 MG Oral Tablet Extended Release; TAKE 1 TABLET 
EVERY 8 HOURS MDD:3LD 10/27/2020 2. Renew: oxyCODONE-Acetaminophen  MG 
Oral Tablet; TAKE 1 TABLET 3 times daily PRN PAIN MDD:3LD 10/27/2020 3. Block 
([facility]) Referral Procedure  Procedure  Status: Hold For - Scheduling  Requested 
for: [id number] Type : MedicareIs patient currently on a biologic? : NoIs 
patient taking Aspirin 325 mg or greater...? : NoIs your patient on an 
Anticoagulant -OR- have Coagulopathy...? : NoSedation : YesBlock Laterality : RF
 STEPHEN LT side first, then RT side in 1-2 weeks same MDArea : LumbarBlock : Medial
 Branch Radiofrequency (traditional thermal non-pulsed radiofrequency as    
deemed appropriate by the interventional physician)Professional  
needed for block? : NoFront Desk Reminder: : Schedule the Status Post BlockIs 
this an MVP patient (for RF's only)...........?? : No, this is not an MVP 
PatientIs this a RF on a Patient with a Pacemaker/ICD...?? : NoPt weight: SODS: 
</= 450 lbs. HODS: </= 400 lbs.  ENTER WEIGHT: : 214**IF** PT has 
Thrombocytopenia are platelets >/= 100,000 ? : NAAre you ordering pyhsical 
therapy...? : NoDoes patient require a Kenzie lift? : NoIs this an urgent 
request? : No - This is not an urgent requestCovid Risk : Low Risk Medication:. 
 [place]  Information:  was consulted by my designee and I have reviewed the 
information presented to me and find no aberrant compliance issues. Patient has 
been informed.  Controlled Substance Prescribed: MORPHINE ER, PERCOCET . 
CONTROLLED SUBSTANCE INFORMATION: I advised the patient today/previously rega
rding treatment with the above controlled substance and/or narcotic. The patient
 was then informed of the risks, benefits, and alternatives of the narcotic. The
 risks discussed included but were not limited to physical and/or psychological 
dependence, tolerance of the medication, drowsiness, sleepiness, 
balance/coordination problems, confusion, allergic reaction or any other 
abnormal symptoms. The patient was advised and agreed to use the medication only
 as prescribed, and not to drive, or operate heavy equipment or machinery. The 
patient understood and consented to both undergo a narcotic/opiate regimen and 
agreed to sign and comply with all of the [facility] 
terms of a controlled substance treatment and agreement. The patient is on the 
lowest possible dose of opioids. The patient denies adverse side effects and 
does not demonstrate any aberrant drug taking behaviors. The patient is able to 
perform ADL's  including the following activities for longer periods of time wit
h less pain: activities of daily living, sleeping, walking, standing, sitting, 
grocery shopping . There are no changes in current medications at this visit. 
Patient instructed to continue current regimen.  The patient is on the lowest 
possible dose of opioids. The patient denies adverse side effects and does not 
demonstrate any aberrant drug taking behaviors. The patient is able to perform 
ADL's  including the following activities for longer periods of time with less 
pain: activities of daily living, sleeping, walking, standing, sitting, grocery 
shopping . The prescribed medications are medically necessary for pain 
management and rehabilitation. Treatment includes: PROCEDURE(S):  ASIPP Risk 
Stratification of Patients presenting for Interventional Pain Procedures: 
Decreasing Morbidity of COVID-19 Comments: 60=1 PTComments: 0Comments: 
0Comments: 34.54=2Comments: 0Comments: 0Comments: HEP A 2 PTComments: 0Total 
Points: 5/15 According to the Covid-19 ASIPP guidelines and the medical history 
as relayed to me by the patient, the Covid-19 risk stratification is low .- 
Nerve Block Plan: I am ordering a, BILATERAL, LUMBAR, FACET JOINT MEDIAL BRANCH 
NERVE BLOCK. Definitive levels to be determined by the interventional physician 
based on fluoroscopic imaging and symptomatology at the time of the procedure. 
This procedure is , targeting the L3 L4 level, L4 L5 level, L5 S1 level and 
under fluoroscopy with SEDATION. NERVE BLOCK: The material risks, benefits, 
alternatives have been discussed with the patient, including no treatment. They 
include, but are not limited to, bleeding, bruising, infection, damage to 
targeted and non-targeted tissue, increased pain, nerve injury or other 
reaction, if severe, could lead to CVA, arrhythmias or death. The patient was 
given procedure instructions and educational material for this specific 
procedure at the time of the visit.- Bleeding Disorder: Patient denies having a 
bleeding disorder. - Anticoagulation therapy:. Patient denies current treatment 
with anti-coagulation therapy. RADIO FREQUENCY: Radio Frequency Pacer 
Information: Patient denies having a pacemaker / defibrillator.  NERVE BLOCK: 
The material risks, benefits, alternatives have been discussed with the patient,
 including no treatment. They include, but are not limited to, bleeding, 
bruising, infection, damage to targeted and non-targeted tissue, increased pain,
 nerve injury or other reaction, if severe, could lead to CVA, arrhythmias or 
death. The patient was given educational materials for this specific procedure 
at the time of the visit. The patient received a copy of our after care 
instructions.Radio Frequency Variance/Authorization Request We are requesting 
authorization (Private) for Radio Frequency procedure- Repeat Radio Frequency 
Detail I am ordering a, traditional thermal non-pulsed repeat RADIO FREQUENCY 
ABLATION of the LEFT and RIGHT, LUMBAR FACET JOINTS under fluoroscopy as done by
 previous radio frequency ablation at least 6 months ago as deemed appropriate 
by the interventional physician. The plan is to schedule the patient for the 
left side first, followed by the right side in 1 to 2 weeks. Targeting the L3 L4
 level, L4 L5 level and L5 S1 level. This procedure will be done under 
SEDATION.- Bleeding Disorder: Patient denies having a bleeding disorder. Patient
 Denies Current treatment with anti-coagulation therapy. Nerve Block: The 
material risks, benefits, alternatives have been discussed with the patient, 
including no treatment. They include, but are not limited to, bleeding, 
bruising, infection, damage to targeted and non-targeted tissue, increased pain,
 nerve injury or other reaction, if severe, could lead to CVA, arrhythmias or 
death. The patient was given educational materials at the time of the visit. The
 patient received a copy of our after care instructions.- Medical Necessity: RF 
Medical Necessity Criteria: Repeat Facet RF: The patient's pain is axial in 
nature (NOT radicular). There is no evidence of nerve root compression in past 
medical or diagnostic history in relation to this pain. It has been at least 6 
months and the original pain has returned. Patient has experienced >/= 50% of 
improvement of pain and improvement in specific ADL's documented for at least 6 
months.  The patient shows marked functional improvement in the following areas:
 ambulation , activities of daily living, sleep pattern, cognitive improvements,
 decrease in medication used for PRN pain relief and quality life improvements. 
Please refer to [facility] Physical Modality Improvement
 Questionnaire for details. THERAPIES: Therapy Treatment Plan: Deferred: All 
Therapies: Deferred: per patient request. FOLLOW UP: The patient should have a 
follow up visit 4 weeks post block. CONTINUE TREATMENT: Yusra will continue with 
the following:. Yusra is participating in a home exercise program and is 
encouraged to continue.        Discussion/SummaryYusra is seen for worsening low 
back pain. She is over due to get the RF procedure this was due some time ago. 
She has axial pain and pain with extension. She was counseled regarding her meds
 and will be seen back after the block. The meds are helpful. She has low covid 
risk and wishes to proceed.   Signatures Electronically signed by : Arabella Brown NP; Oct 27 2020  3:57PM EST                       (Author)  Electronically 
signed by : Sourav Shepherd MD; Oct 28 2020  8:57AM EST                       









          Name      Value     Range     Interpretation Code Description Data Alla

rce(s) Supporting 

Document(s)

 

                                                                       









                    ID                  Date                Data Source

 

                    [id number]            10/09/2020 04:06:53 PM EDT [facility]

e and Wellness [facility], PCName: Kandis kay ChildsDOB: 1960Provider: 

Gabriela Navarro: 10/09/2020 Chief Complaint 2 MA completing section: 
ASmithLPN   History of Present IllnessA Urine Drug Screen was ordered for Yusra Dietz and collected on site today 10/09/2020. Creatinine has been ordered as 
well for specimen validity, not for kidney function. Preliminary UDS results are
 not final and should not be used to determine patient care or plan of 
treatment. Initially a qualitative immunoassay screen will be done. Any positive
 findings or negative findings that are out of compliance will be further tested
 with a more comprehensive quantitative confirmation LCMS study. It is part of 
the normal prescribing protocol of controlled substances and is considered 
standard of care. The patient is being seen today for refill of prescriptions 
for controlled substances.      The date of onset of symptoms is approximately 
years.   Current Meds 1. Aleve-D Sinus  Cold TB12; Therapy: (Recorded:2017)
 to Recorded 2. Aspirin  MG Oral Tablet Delayed Release; take one pill po 
every day; Therapy: 11Rgb5362 to (Evaluate:2018) Recorded 3. 
Cyclobenzaprine HCl - 10 MG Oral Tablet; 1 po TID prn spasms MDD 3; Therapy: 
80Uim4559 to (Evaluate:36Iei4412)  Requested for: 98Fbv7992; Last Rx:80Amk6452 
Ordered 4. Lexapro 20 MG Oral Tablet; Therapy: (Recorded:03Oxp6518) to Recorded 
5. Magnesium Oxide 400 MG Oral Capsule; Therapy: 84Qly3342 to Recorded 6. 
Minocycline HCl CAPS; Therapy: (Recorded:48Xso4897) to Recorded 7. Morphine 
Sulfate ER 15 MG Oral Tablet Extended Release; TAKE 1 TABLET EVERY 8 HOURS 
MDD:3; Therapy: 21Mxr0261 to (Evaluate:24Koc6734)  Requested for: 79Enh2382; 
Last Rx:71Jyb5653 Ordered 8. oxyCODONE-Acetaminophen  MG Oral Tablet; TAKE
 1 TABLET 3 times daily PRN PAIN MDD:3; Therapy: 59Svh1388 to 
(Evaluate:01Lol8432)  Requested for: 96Lmp9978; Last Rx:60Mjc1359 Ordered 9. 
Potassium Gluconate 595 (99 K) MG Oral Tablet; Therapy: (Recorded:2016) to 
Recorded 10. Protonix 40 MG Oral Tablet Delayed Release;  Therapy: 2018 to 
Recorded 11. Wellbutrin TABS;  Therapy: (Recorded:36Hil3534) to Recorded 
Allergies  Morphine Derivatives Itching; Updated By: Geno Zapata; 
2018 3:14:52 PMonly iv morphineDenied   Adhesive Tape Recorded By: 
Alesha Hollis; 2016 12:56:08 PM  Iodinated Contrast Media Recorded By: 
Alesha Hollis; 2016 12:56:08 PM  Latex Recorded By: Alesha Hollis; 
2016 12:56:08 PM NotesNYSW 30 Day RX Note: Chief complaint: Patient is here 
today for 30 day refill of their controlled substance prescription Patient is 
being treated with chronic opioid therapy for CHRONIC LOW BACK PAIN.  Patient 
denies side effects related to chronic opioid use and has been re-educated 
regarding the controlled substance agreement. In my opinion patient continues to
 receive primary benefit with chronic opioid therapy. MORPHINE SULFATE ER, 
OXYCODONE . CONTROLLED SUBSTANCE INFORMATION: I advised the patient 
today/previously regarding treatment with the above controlled substance and/or 
narcotic. The patient was then informed of the risks, benefits, and alternatives
 of the narcotic. The risks discussed included but were not limited to physical 
and/or psychological dependence, tolerance of the medication, drowsiness, 
sleepiness, balance/coordination problems, confusion, allergic reaction or any 
other abnormal symptoms. The patient was advised and agreed to use the 
medication only as prescribed, and not to drive, or operate heavy equipment or 
machinery. The patient understood and consented to both undergo a 
narcotic/opiate regimen and agrees to comply with all of the [facility] terms of a controlled substance treatment and agreement.  Patient has 
received a copy of the Opioid instruction sheet.  I am giving the patient a 30 
day refill without changes. The patient consents to move forward with treatment.
 ...OPIOID ABUSE is a national epidemic that Physicians and other prescribers 
have the power to help prevent. In our opioid monitoring surveillance to help 
minimize misuse and diversion, moderate to high risk patients are required to 
have face to face 30 day refill of opioids. This will help minimize the pot
ential for electronic false requests, deterring potential fraudulent behavior. 
[facility]  Information:  was consulted by my designee and I have reviewed the 
information presented to me and find no aberrant compliance issues. Patient has 
been informed.    Physical ExamGeneral: The patient is a well nourished/well 
developed, female, who is obese, who is in no acute distress and appears stated 
age. Eyes: Lids are atraumatic, no lesions, sclerae are anicteric. Ears, Nose, 
Mouth, Throat: Patient wearing a mask due to COVID-19. Respiratory: Normal chest
 expansion and respiratory effort. Gait and Station: Gait was normal. Skin: 
Warm, dry, acyanotic. Psychological: Alert and oriented to person, place and 
time. Mood and affect are pleasant and appropriate. Judgement intact. Insight 
normal without delusions or hallucinations.  Denies suicidal/homicidal ideation.
   [facility] Prior UDS Results Detail Form: This patient had a urine 
drug screen on 2020. Results are in compliance.   Assessment 1. Chronic low
 back pain (724.2,338.29) (M54.5,G89.29) 2. Facet arthropathy, lumbar (721.3) 
(M47.816) 3. Long term current use of opiate analgesic (V58.69) (Z79.891) Plan 
1. Renew: Morphine Sulfate ER 15 MG Oral Tablet Extended Release; TAKE 1 TABLET 
EVERY 8 HOURS MDD:3LD10/2020 2. Renew: oxyCODONE-Acetaminophen  MG Oral 
Tablet; TAKE 1 TABLET 3 times daily PRN PAIN MDD:3LD 10/9/2020 3. UDS-LAB 
Medicare / Commercial ([facility] Urine Drug SCreen); [Do Not Release]; Specimen 
Source:Urine; Status:In Progress - Specimen/Data Collected;   Done: 2020 
[facility] Treatment Plan (2): UDS: This is an established patient and is on ongoing 
opioid therapy. A UDS is being obtained today, the patient has previously 
consented to UDS as part of the Controlled Substance and Treatment Agreement. 
The reason for today's UDS is: patient was selected at random and scored as 
moderate risk on the opioid risk assessment. Creatinine has been ordered as well
for specimen validity, not for kidney function. Preliminary UDS results are not 
final and should not be used to determine patient care or plan of treatment. 
Initially a qualitative immunoassay screen will be done. Any positive findings 
or negative findings that are out of compliance will be further tested with a 
more comprehensive quantitative confirmation LCMS study. It is part of the 
normal prescribing protocol of controlled substances and is considered standard 
of care.  Treatment includes: FOLLOW UP: The patient should have a follow up 
visit in 1 month.        Signatures Electronically signed by : FE Lr; Oct  9 2020  3:44PM EST                       (Author)  Electronically 
signed by : Laura Castro MD; Oct  9 2020  4:06PM EST                       









          Name      Value     Range     Interpretation Code Description Data Alla

rce(s) Supporting 

Document(s)

 

                                                                       









                    ID                  Date                Data Source

 

                    [id number]            09/10/2020 08:23:42 AM EDT [facility]

e and Wellness [facility], PCName: Kandis kay TicosDOB: 1960Provider: Loulou Brown: 2020 Chief ComplaintPatient presents with back pain  Chief 
Complaint 2[place] VAS PAIN Established:  MA completing section: shall CNA   
History of Present IllnessDo you have a Brace for your condition? Patient does 
not have a brace for their condition. Do you have a TENS Unit for your 
condition? Patient does not have a TENS Unit for their condition. Supplements: 
Patient is not currently taking any supplements. Nerve Conduction: Patient has 
had a Nerve Conduction Test. Recent test/procedures: Patient was asked and 
denies having any tests since their last visit. Patient was asked and denies 
being seen by any Physicians since their last visit. The patient was last seen 
by a [facility] provider on 2020. At today's visit patient
presents with their Self Patient is not currently working. What was patient's 
previous occupation? . The patient is being seen for a follow-up.
      Pain Duration: Years   Pain Quality: (Neuropathic)  burning, numbness and 
tingling Pain Quality: (Nociceptive)  aching, dull and sharp Timing:  constant 
Progression:  unchanged Palliation:  opioid analgesics, rest and resting/laying 
down Exacerbating:  standing and walking Pain Score:  a current pain level of 
6/10 and a minimum pain level of 4/10. Condition type: The patient is being seen
for a chronic condition. PAIN LOCATION:  the pain is located in the low back ,  
(weakness , achy and throbbing pain) radiates to the right buttock, left 
buttock, right hip, left hip, right thigh, right knee, right calf/shin, right 
ankle, right big toe and right pinky toe, the pain is greater on the right side 
more than the left and the pain is in the leg equally with the back. The 
etiology of this injury/condition is unknown. RELATIONSHIP TO INJURY: This 
condition is not related to a specific injury. INJURY MECHANISM: The injury 
resulted from  no known physical event. REVIEW OF PAST DIAGNOSTICS: have includ
ed:  MRI and electromyography/nerve conduction studies . Records were obtained, 
reviewed and on file. PAST TREATMENT has included:  NONSTEROIDAL ANTI-
INFLAMMATORY drugs (not effective) Includes Mobic., but ANTICONVULSANTS 
(effective) Includes Lyrica., OPIOID ANALGESICS (effective) Includes. 
hydrocodone does not work. fentanyl patch seems to be working well., cervical 
surgery 2015 (effective). - Radio Frequency Pertinent Information: On a 
RADIO FREQUENCY ABLATION of the BILATERAL, LUMBAR FACET JOINTS under fluoroscopy
as confirmed by previous successful medial branch nerve blocks. Targeting the.  
80 % of pain relief was provided which is ongoing. Allows patient to increase 
activity and functionality Including the following activities for longer periods
of time with less pain: activities of daily living, better sleep, walking, 
standing, sitting and grocery shop. And to decrease the following pain 
medication(s): 19, 19 DR. RASHID. Good reduction of pain in the low back
and able to walk better. INTERVAL EVENTS: include . She is still dealing with 
the low back pain...she is getting ready to have a block and it would be the RF 
procedure but she is just not yet ready.. She is still taking care of the grand 
kids and now with school and covid. She has had some episodes of bad muscle 
spasm. ASSOCIATED SYMPTOMS: include difficulty sleeping , difficulty walking, 
muscle pain/spasm, radiating, extremity weakness:  left, lower extremity, right.
and urinary incontinence, but no fecal incontinence. FUNCTIONAL LIMITATIONS: The
patient's functional status is limited as follows: ability to  perform 
activities of daily living, participate in aerobic activity, participate in 
hobbies, perform housework and maintain normal sleep pattern. MEDICATION SIDE 
EFFECTS experienced by patient are:  drowsiness.   Review of 
SystemsConstitutional: Normal. Eyes: Normal. ENT: normal. Cardiovascular: 
Normal. Respiratory: Normal. Gastrointestinal: Normal. Genitourinary: Normal. 
Musculoskeletal: lower back pain and joint pain. Integumentary: Normal. 
Neurological: Normal. Psychiatric: Normal. Endocrine: Normal. 
Hematologic/Lymphatic: Normal.   Patient maintains at today's visit there has 
been no change in his/her hematologic history.   I reviewed the above with the 
patient and I feel the ROS to be negative/normal other than low back and joint 
and at times upper thigh pain.   Active Problems 1. Chronic hip pain, left 
(719.45,338.29) (M25.552,G89.29) 2. Chronic low back pain (724.2,338.29) 
(M54.5,G89.29) 3. Facet arthropathy, lumbar (721.3) (M47.816) 4. History of 
depression (V11.8) (Z86.59) 5. History of fibromyalgia (V13.59) (Z87.39) 6. 
History of osteoarthritis (V13.4) (Z87.39) 7. Long term current use of opiate 
analgesic (V58.69) (Z79.891) 8. Lumbar radiculopathy (724.4) (M54.16) 9. Lumbar 
spondylosis (721.3) (M47.816) 10. Osteoarthritis of hip (715.95) (M16.9) 11. 
Primary osteoarthritis of hip (715.15) (M16.10) 12. Spondylolisthesis, lumbar 
region (738.4) (M43.16) Allergies  Morphine Derivatives Itching; Updated By: 
Geno Zapata; 2018 3:14:52 PMonly iv morphineDenied   Adhesive Tape 
Recorded By: Alesha Hollis; 2016 12:56:08 PM  Iodinated Contrast Media 
Recorded By: Alesha Hollis; 2016 12:56:08 PM  Latex Recorded By: Alesha Hollis; 2016 12:56:08 PM Current Meds  Aleve-D Sinus  Cold TB12;Therapy: 
(Recorded:2017) to Recordedld 10/9/17 am  Aspirin  MG Oral Tablet 
Delayed Release; take one pill po every day;Therapy: 72Ebx1255 to 
(Evaluate:2018) Recordedself prescribed  Lexapro 20 MG Oral Tablet;Therapy:
(Recorded:27Nym6974) to Recorded  Magnesium Oxide 400 MG Oral Capsule;Therapy: 
39Gal2324 to Recorded  Minocycline HCl CAPS;Therapy: (Recorded:60Nxp6050) to 
Recorded  Morphine Sulfate ER 15 MG Oral Tablet Extended Release; TAKE 1 TABLET 
EVERY 8HOURS MDD:3;Therapy: 49Moj9145 to (Evaluate:44Qdr4592)  Requested for: 
2020; LastRx:2020 OrderedLD 2020 10:00am  oxyCODONE-Acetaminophen 
 MG Oral Tablet; TAKE 1 TABLET 3 times daily PRNPAIN MDD:3;Therapy: 16Aug2
019 to (Evaluate:24Xkr0351)  Requested for: 2020; LastRx:2020 
OrderedLD 2020 10:30am  Potassium Gluconate 595 (99 K) MG Oral 
Tablet;Therapy: (Recorded:2016) to Recorded  Protonix 40 MG Oral Tablet 
Delayed Release;Therapy: 2018 to Recorded  Wellbutrin TABS;Therapy: 
(Recorded:2018) to Recorded Past Medical History  History of Chronic 
headaches (784.0) (R51)  Denied: History of blood coagulation disorder  History 
of degenerative disc disease (V13.59) (Z87.39)  History of depression (V11.8) 
(Z86.59)  History of fibromyalgia (V13.59) (Z87.39)  Assessed By: Arabella Brown 
(Pain Management); Last Assessed: 2017  History of hepatitis A virus 
infection (V12.09) (Z86.19)  History of kidney stones (V13.01) (Z87.442)  
History of osteoarthritis (V13.4) (Z87.39)  Assessed By: Arabella Brown (Pain 
Management); Last Assessed: 2017  History of small bowel obstruction 
(V12.79) (Z87.19)    History of Not currently pregnant (V49.89) (Z78.9)  
Denied: History of On anticoagulant therapy Surgical History  History of 
Appendectomy  3/2013  History of Back Surgery  2015    C5 disk replacement  
History of Gastric Surgery For Morbid Obesity  2006  History of Hernia Repair  1
2013  History of Hip Replacement Left  History of Hysterectomy  3/2004  
Denied: History of Implantable Cardioverter-Defibrillator  History of Knee 
Arthroplasty  History of Knee Replacement  2005  History of Knee Surgery  
2015  Denied: History of Pacemaker Placement  History of Total Hip Replacement
  Family History  Family history of arthritis (V17.7) (Z82.61)  Family 
history of cardiac disorder (V17.49) (Z82.49)  Family history of cardiac 
disorder (V17.49) (Z82.49)  Family history of diabetes mellitus (V18.0) (Z83.3) 
Family history of malignant neoplasm (V16.9) (Z80.9) Social History  Current 
non-drinker of alcohol (V49.89) (Z78.9)  Denied: History of Drug use    
Never a smoker  Not currently employed VitalsVital Signs Recorded: 2020 
03:35PM Height: 5 ft 7 inWeight: 216 lb BMI Calculated: 33.83BSA Calculated: 
2.09Systolic: 126, SittingDiastolic: 78, SittingHeart Rate: 84Respiration: 
16Temperature: 97.6 F, TympanicHeight measured w/wo shoes: w/shoesPain Scale: 6 
Physical ExamGeneral: The patient is a well nourished/well developed, female, 
heavy set, who is in mild distress and appears stated age. Eyes: Lids are 
atraumatic, no lesions, sclerae are anicteric. Ears, Nose, Mouth, Throat: 
external ears and nose without trauma. Respiratory: Normal chest expansion and 
respiratory effort. Lumbosacral Spine: - Inspection: normal appearance. No 
deformity, ecchymosis, erythema or swelling noted. Normal Lordosis.. - 
Palpation/Tenderness: Tenderness on palpation of the LEFT: paraspinal  and 
sacroiliac joint, but negative left sciatic notch. - Palpation/Tenderness: 
Tenderness on palpation of the RIGHT: paraspinal  and sacroiliac joint, but 
negative right sciatic notch. - ROM Flexion: was not restricted, was painless. -
ROM Extension: was not restricted, was painful.Right Lower Extremity Motor:- 
Hip: 5/5 flexion, 5/5 extension- Knee: 5/5 flexion, 5/5 extension- Foot: 5/5 
Plantar , 5/5 DorsiFlexionSpecial Tests: flip test was negative, positive facet 
challenge  and positive faberLeft Lower Extremity Motor:- Hip: 5/5 flexion, 5/5 
extension- Knee: 5/5 flexion, 5/5 extension- Foot: 5/5 Plantar , 5/5 
DorsiFlexionSpecial Tests: flip test was negative, positive facet challenge  and
positive faberNeurological:Sensation Left Lower: normalSensation Right Lower: 
normal. Skin: Warm, dry, acyanotic. Psychological: Alert and oriented to person,
place and time. Mood and affect are pleasant and appropriate. Judgement intact. 
Insight normal without delusions or hallucinations.  Denies suicidal/homicidal 
ideation.   Results/DataThis patient had a urine drug screen on 2020. 
Results are in compliance.   Assessment 1. Muscle spasm of back (724.8) 
(M62.830) 2. Lumbar radiculopathy (724.4) (M54.16) 3. Lumbar spondylosis (721.3)
(M47.816) Plan 1. Start: Cyclobenzaprine HCl - 10 MG Oral Tablet; 1 po TID prn 
spasms MDD 3 2. Renew: Morphine Sulfate ER 15 MG Oral Tablet Extended Release; 
TAKE 1 TABLET EVERY 8 HOURS MDD:32020 10:00am 3. Renew: oxyCODONE-
Acetaminophen  MG Oral Tablet; TAKE 1 TABLET 3 times daily PRN PAIN 
MDD:3LD 2020 10:30am 4. Follow-up in 2 months Follow Up  Follow-up  Status: 
Hold For - Scheduling  Requested for:  of the day. glaSubhash Ap
pointment for 15 or 30 minutes : Schedule 15 minute appointment In my opinion 
based on subjective and objective findings from today's visit the patient is 
minimally changed. Medication:.  [place]  Information:  was consulted by my 
designee and I have reviewed the information presented to me and find no 
aberrant compliance issues. Patient has been informed.  General Medications 
Prescribed: CYCLOBENZEPRINE . GENERAL MEDICATIONS: I advised the patient 
today/previously regarding treatment with the above medication(s). The risks, 
benefits, common side effects and alternative treatments were discussed with the
patient. The provider verbalized with the patient. The patient verbalized 
understanding and was told to call if there were any untoward effects. 
Controlled Substance Prescribed: MORPHINE ER, OXYCODONE . CONTROLLED SUBSTANCE 
INFORMATION: I advised the patient today/previously regarding treatment with the
above controlled substance and/or narcotic. The patient was then informed of the
risks, benefits, and alternatives of the narcotic. The risks discussed included 
but were not limited to physical and/or psychological dependence, tolerance of 
the medication, drowsiness, sleepiness, balance/coordination problems, 
confusion, allergic reaction or any other abnormal symptoms. The patient was 
advised and agreed to use the medication only as prescribed, and not to drive, 
or operate heavy equipment or machinery. The patient understood and consented to
both undergo a narcotic/opiate regimen and agreed to sign and comply with all of
the [facility] terms of a controlled substance 
treatment and agreement. The patient is on the lowest possible dose of opioids. 
The patient denies adverse side effects and does not demonstrate any aberrant 
drug taking behaviors. The patient is able to perform ADL's  including the 
following activities for longer periods of time with less pain: activities of 
daily living, sleeping, walking, standing, sitting, grocery shopping . There are
no changes in current medications at this visit. Patient instructed to continue 
current regimen.  The patient is on the lowest possible dose of opioids. The 
patient denies adverse side effects and does not demonstrate any aberrant drug 
taking behaviors. The patient is able to perform ADL's  including the following 
activities for longer periods of time with less pain: activities of daily 
living, sleeping, walking, standing, sitting, grocery shopping . The prescribed 
medications are medically necessary for pain management and rehabilitation. 
Treatment includes: PROCEDURE(S): The patient defers blocks/procedures at this 
time RADIO FREQUENCY: (She is due to get this but defers at this time) 
THERAPIES: Therapy Treatment Plan: Deferred: All Therapies: Deferred: per 
patient request. FOLLOW UP: The patient should have a follow up 30 Day RX. 
CONTINUE TREATMENT: Yusra will continue with the following:. Yusra is 
participating in a home exercise program and is encouraged to continue. - 
: The patient was counseled on the following:  treatment plan and future 
treatment options.        Discussion/SummaryYusra is seen for her low back and 
thigh pain. She is over due to get the RF procedure but has been putting this 
off due to commitments related to caring for her grand kids. At this time she 
wants to hold off on any procedures and will be seen in a month.   Signatures 
Electronically signed by : Arabella Brown NP; Sep  8 2020  4:31PM EST             
         (Author)  Electronically signed by : Sonja Jones MD; Sep 10 2020  
8:23AM EST                       









          Name      Value     Range     Interpretation Code Description Data Alla

rce(s) Supporting 

Document(s)

 

                                                                       









                    ID                  Date                Data Source

 

                    [id number]            2020 08:43:28 AM EDT [facility]

[facility], PCName: Kandis kay ChildsDOB: 1960Provider: Ryan RamirezOS: 2020 









          Name      Value     Range     Interpretation Code Description Data [facility]

rce(s) Supporting 

Document(s)

 

                                                                       









                    ID                  Date                Data Source

 

                    [id number]            2020 03:07:09 PM EDT [facility]

[facility] Well[facility], PCName: Kandis DoshisDOB: 1960Provider: Jemma Ramirez: 2020 Chief Complaint 2MA completing section: shall CNA   History
of Present IllnessThe patient is being seen today for refill of prescriptions 
for controlled substances.      The date of onset of symptoms is approximately 
Years.   Current Meds 1. Aleve-D Sinus  Cold TB12; Therapy: (Recorded:2017)
to Recorded 2. Aspirin  MG Oral Tablet Delayed Release; take one pill po 
every day; Therapy: 36Emb6280 to (Evaluate:2018) Recorded 3. Lexapro 20 MG 
Oral Tablet; Therapy: (Recorded:11Mub8479) to Recorded 4. Magnesium Oxide 400 MG
Oral Capsule; Therapy: 40Wzg1711 to Recorded 5. Minocycline HCl CAPS; Therapy: 
(Recorded:01Wou4050) to Recorded 6. Morphine Sulfate ER 15 MG Oral Tablet 
Extended Release; TAKE 1 TABLET EVERY 8 HOURS MDD:3; Therapy: 70Ilt9634 to 
(Evaluate:92Cjs0378)  Requested for: 87Zoz7559; Last Rx:82Bon3880 Ordered 7. 
oxyCODONE-Acetaminophen  MG Oral Tablet; TAKE 1 TABLET 3 times daily PRN 
PAIN MDD:3; Therapy: 55Aqz0050 to (Evaluate:18Wfp3459)  Requested for: 
32Fgc4312; Last Rx:97Lvt0170 Ordered 8. Potassium Gluconate 595 (99 K) MG Oral T
ablet; Therapy: (Recorded:2016) to Recorded 9. Protonix 40 MG Oral Tablet 
Delayed Release; Therapy: 2018 to Recorded 10. Wellbutrin TABS;  Therapy: 
(Recorded:2018) to Recorded Allergies  Morphine Derivatives Itching; 
Updated By: Geno Zapata; 2018 3:14:52 PMonly iv morphineDenied   
Adhesive Tape Recorded By: Alesha Hollis; 2016 12:56:08 PM  Iodinated 
Contrast Media Recorded By: Alesha Hollis; 2016 12:56:08 PM  Latex 
Recorded By: Alesha Hollis; 2016 12:56:08 PM NotesNYSW 30 Day RX Note: 
Chief complaint: Patient is here today for 30 day refill of their controlled 
substance prescription Patient is being treated with chronic opioid therapy for 
back pain.  Patient denies side effects related to chronic opioid use and has 
been re-educated regarding the controlled substance agreement. In my opinion 
patient continues to receive primary benefit with chronic opioid therapy. 
MORPHINE AND OXYCODONE . CONTROLLED SUBSTANCE INFORMATION: I advised the patient
today/previously regarding treatment with the above controlled substance and/or 
narcotic. The patient was then informed of the risks, benefits, and alternatives
of the narcotic. The risks discussed included but were not limited to physical 
and/or psychological dependence, tolerance of the medication, drowsiness, 
sleepiness, balance/coordination problems, confusion, allergic reaction or any 
other abnormal symptoms. The patient was advised and agreed to use the 
medication only as prescribed, and not to drive, or operate heavy equipment or 
machinery. The patient understood and consented to both undergo a narcotic
/opiate regimen and agrees to comply with all of the [facility]
terms of a controlled substance treatment and agreement.  I am giving the 
patient a 30 day refill without changes. The patient consents to move forward 
with treatment. ...OPIOID ABUSE is a national epidemic that Physicians and other
prescribers have the power to help prevent. In our opioid monitoring 
surveillance to help minimize misuse and diversion, moderate to high risk 
patients are required to have face to face 30 day refill of opioids. This will 
help minimize the potential for electronic false requests, deterring potential 
fraudulent behavior. [facility]  Information:  was consulted by my designee and I
have reviewed the information presented to me and find no aberrant compliance 
issues. Patient has been informed.    Physical ExamGeneral: The patient is a 
well nourished/well developed, female, heavy set, who is in no acute distress 
and appears stated age. Eyes: Lids are atraumatic, no lesions, sclerae are anic
teric. Ears, Nose, Mouth, Throat: external ears and nose without trauma. 
Respiratory: Normal chest expansion and respiratory effort. Gait and Station: 
Gait was normal. Skin: Warm, dry, acyanotic. Psychological: Alert and oriented 
to person, place and time. Mood and affect are pleasant and appropriate. 
Judgement intact. Insight normal without delusions or hallucinations.  Denies 
suicidal/homicidal ideation.   Assessment 1. Chronic low back pain 
(724.2,338.29) (M54.5,G89.29) 2. Lumbar radiculopathy (724.4) (M54.16) 3. Lumbar
spondylosis (721.3) (M47.816) Plan 1. Renew: Morphine Sulfate ER 15 MG Oral Tab
let Extended Release; TAKE 1 TABLET EVERY 8 HOURS MDD:3LD 8/10/2020 6:30pm 2. 
Renew: oxyCODONE-Acetaminophen  MG Oral Tablet; TAKE 1 TABLET 3 times 
daily PRN PAIN MDD:32020 11:30pm 3. 30 Day RX Management  Follow-up  
Status: Hold For - Scheduling  Requested for: 2020). Is an additional 
appointment needed....? : No). Schedule 30 day RX from TODAY's date (2 days +/- 
either way): : 2020 [facility] Treatment Plan (2): UDS: This is an established 
patient and is on ongoing opioid therapy. A UDS is being obtained today, the 
patient has previously consented to UDS as part of the Controlled Substance and 
Treatment Agreement. The reason for today's UDS is: patient was selected at 
random and scored as moderate risk on the opioid risk assessment. Creatinine has
been ordered as well for specimen validity, not for kidney function. Preliminary
UDS results are not final and should not be used to determine patient care or 
plan of treatment. Initially a qualitative immunoassay screen will be done. Any 
positive findings or negative findings that are out of compliance will be 
further tested with a more comprehensive quantitative confirmation LCMS study. 
It is part of the normal prescribing protocol of controlled substances and is 
considered standard of care.         Signatures Electronically signed by : Valentina Ramirez NP; Aug 11 2020  2:59PM EST                       (Author)  
Electronically signed by : Sourav Shepherd MD; Aug 11 2020  3:07PM EST               
       









          Name      Value     Range     Interpretation Code Description Data Alla

rce(s) Supporting 

Document(s)

 

                                                                       









                    ID                  Date                Data Source

 

                    [id number]          2020 12:00:00 AM EDT [facility]

 

                                        ________________________________________

________________________Teri Dickerson MD                  Patient Name: YUSRA DIETZ [id number]                  
  YOB: 1960[place]  [address]                    Date of Exam:
2020#: [phone]: 
[phone]________________________________________________________________EXAM: 
MAMMO SCREENING  WITH CADCLINICAL INFORMATION:  Screening.Comparison 2019 as
well as other prior exams.Family history of mother with breast cancer at age 58,
grandmother withbreast cancer at age 60 and three cousins with breast 
cancer.Based on the personal health history and familial cancer history 
yourpatient supplied at the time of imaging, her lifetime risk of breast 
cancerestimated by the Tyrer-Cuzick model is 28.1%.  However, due to the unk
nowngene mutation status, there are limitations to the accuracy of this 
riskestimate.  Similarly, if anything changes in the personal and/or 
familyhistory this percentage could increase or decrease.  Currently, 
theNational Comprehensive Cancer Network and American Cancer Society 
recommendadjunctive breast MRI screening starting at age 30 for women with a> 
20-25% lifetime risk of developing breast cancer.Based on the personal and 
family history information your patient suppliedat the time of imaging, your 
patient meets the National ComprehensiveCancer Network testing criteria and 
elected to meet with our hereditarycancer specialist which may include genetic 
testing.  Results are pending.This test result could increase or decrease your 
patient's breast cancerrisk estimate.  Addendum to follow.Digital screening (2D)
mammography was performed bilaterally.Additionally, breast tomosynthesis (3D 
mammography) was performedbilaterally in  the CC and MLO projections and 
compared to the priorexam(s).There has been no change in appearance of the 
mammogram from studies.There is a mild amount of residual fibroglandular tissue 
remaining, whichis fairly symmetric.  There has been no interval development of 
dominantmasses, areas of structural distortion, or clusters of 
microcalcificationstypical of malignancy.  Scattered lymph nodes are seen in the
axilla.The Volpara volumetric breast density category is B, there are 
scatteredareas of fibroglandular density.IMPRESSION:BI-RADS Category 1 - 
Negative Mammogram.  Currently no mammographicevidence of malignancy.  Routine 
followup is recommended in one year.This mammogram was read with the assistance 
of Oren Lakeok AMP, an FDAapproved computer aided detection system for 
mammography.Negative x-ray reports should not delay surgical consultation if a 
dominantor clinically suspicious mass is present.Not all breast cancers can be 
identified by mammography.  Therefore, werecommend that you continue to perform 
regular breast self-examination andphysical examination and then promptly 
contact your physician of anyconcerns or changes.Adenosis and dense breasts may 
obscure an underlying neoplasm.The patient states that a clinical breast 
examination was over a year ago. Given the elevated lifetime risk of breast 
cancer 20% or greater, yearlysupplemental screening MRI of the breasts is 
recommended in addition toannual screening mammography, staggered every 6 
months.BETY Loco/Sandra you for referring YUSRA DIETZ to our 
office.     Electronically Signed - ETHAN WEBB MD  20 18:18   









          Name      Value     Range     Interpretation Code Description Data Alla

rce(s) Supporting 

Document(s)

 

                                                                       









                    ID                  Date                Data Source

 

                    [id number]            07/10/2020 10:05:06 AM EDT [facility]

 

                                        [facility], PCName: Kandis DoshisDOB: 1960Provider: Loulou Brown: 2020 Chief ComplaintPatient presents with low back and leg pain 
Chief Complaint 2Patient presents with left arm numbness [place] VAS PAIN 
Established:  MA completing section: ASmithLPN   History of Present IllnessA 
Urine Drug Screen was ordered for Yusra Dietz and collected on site today 
2020. Creatinine has been ordered as well for specimen validity, not for 
kidney function. Preliminary UDS results are not final and should not be used to
determine patient care or plan of treatment. Initially a qualitative immunoassay
screen will be done. Please note this is not a dip test. Any positive findings 
or negative findings that are out of compliance will be further tested with a 
more comprehensive quantitative confirmation LCMS study. It is part of the 
normal prescribing protocol of controlled substances and is considered standard 
of care. Do you have a Brace for your condition? Patient does not have a brace 
for their condition. Do you have a TENS Unit for your condition? Patient does 
not have a TENS Unit for their condition. Supplements: Patient is not currently 
taking any supplements. Nerve Conduction: Patient has had a Nerve Conduction 
Test. Recent test/procedures: Patient was asked and denies having any tests 
since their last visit. Patient was asked and denies being seen by any 
Physicians since their last visit. The patient was last seen by a [facility] provider on 2020. At today's visit patient presents with their 
Self Patient is not currently working. The patient is being seen for a follow-
up.       Pain Duration: years   Pain Quality: (Neuropathic)  burning Pain 
Quality: (Nociceptive)  aching and stabbing Timing:  constant Progression:  
unchanged Palliation:  block, changing position, opioid analgesics, rest and 
resting/laying down Exacerbating:  lifting, standing and walking Pain Score:  a 
current pain level of 7/10, a minimum pain level of 4/10 and a maximum pain 
level of 10/10. Condition type: The patient is being seen for a chronic 
condition. PAIN LOCATION:  the pain is located in the low back ,  (weakness , 
achy and throbbing pain) radiates to the right buttock, left buttock, right hip,
left hip, right thigh, right knee, right calf/shin, right ankle, right big toe 
and right pinky toe, the pain is greater on the right side more than the left 
and the pain is in the leg equally with the back. The etiology of this 
injury/condition is unknown. RELATIONSHIP TO INJURY: This condition is not 
related to a specific injury. INJURY MECHANISM: The injury resulted from  no 
known physical event. REVIEW OF PAST DIAGNOSTICS: have included:  MRI and 
electromyography/nerve conduction studies . Records were obtained, reviewed and 
on file. PAST TREATMENT has included:  NONSTEROIDAL ANTI-INFLAMMATORY drugs (not
effective) Includes Mobic., but ANTICONVULSANTS (effective) Includes Lyrica., 
OPIOID ANALGESICS (effective) Includes. hydrocodone does not work. fentanyl 
patch seems to be working well., cervical surgery 2015 (effective). - Radio
Frequency Pertinent Information: On a RADIO FREQUENCY ABLATION of the BILATERAL,
LUMBAR FACET JOINTS under fluoroscopy as confirmed by previous successful medial
branch nerve blocks. Targeting the.  80 % of pain relief was provided which is 
ongoing. Allows patient to increase activity and functionality Including the 
following activities for longer periods of time with less pain: activities of 
daily living, better sleep, walking, standing, sitting and grocery shop. And to 
decrease the following pain medication(s): 19, 19 DR. RASHID. Good 
reduction of pain in the low back and able to walk better. INTERVAL EVENTS: 
include . She is having more pain in low back and in her left arm with some 
numbness...she has been taking care of her grand babies and this has been 
especially stressful for her..she can not get a block until she knows what is 
happening with her grand kids because she has had to take care of them over the 
last couple of months. ASSOCIATED SYMPTOMS: include difficulty sleeping , 
difficulty walking, radiating, extremity weakness:  left, lower extremity, 
right. and urinary incontinence, but no fecal incontinence. FUNCTIONAL 
LIMITATIONS: The patient's functional status is limited as follows: ability to  
perform activities of daily living, participate in aerobic activity, participate
in hobbies, perform housework and maintain normal sleep pattern. MEDICATION SIDE
EFFECTS experienced by patient are:  drowsiness.   Review of 
SystemsConstitutional: Normal. Eyes: Normal. ENT: normal. Cardiovascular: 
Normal. Respiratory: Normal. Gastrointestinal: Normal. Genitourinary: Normal. 
Musculoskeletal: lower back pain, midback pain, neck pain, joint pain and limb 
pain. Integumentary: Normal. Neurological: numbness (in left arm). Psychiatric: 
Normal. Endocrine: Normal. Hematologic/Lymphatic: Normal.   Patient maintains at
today's visit there has been no change in his/her hematologic history.   I 
reviewed the above with the patient and I feel the ROS to be negative/normal 
other than neck, low back and joint pain.   Active Problems 1. Chronic hip pain,
left (605.45,338.29) (M25.552,G89.29) 2. Chronic low back pain (724.2,338.29) 
(M54.5,G89.29) 3. Facet arthropathy, lumbar (721.3) (M47.816) 4. History of 
depression (V11.8) (Z86.59) 5. History of fibromyalgia (V13.59) (Z87.39) 6. 
History of osteoarthritis (V13.4) (Z87.39) 7. Long term current use of opiate 
analgesic (V58.69) (Z79.891) 8. Lumbar radiculopathy (724.4) (M54.16) 9. Lumbar 
spondylosis (721.3) (M47.816) 10. Osteoarthritis of hip (715.95) (M16.9) 11. 
Primary osteoarthritis of hip (715.15) (M16.10) 12. Spondylolisthesis, lumbar 
region (738.4) (M43.16) Allergies  Morphine Derivatives Itching; Updated By: 
Geno Zapata; 2018 3:14:52 PMonly iv morphineDenied   Adhesive Tape 
Recorded By: Alesha Hollis; 2016 12:56:08 PM  Iodinated Contrast Media 
Recorded By: Alesha Hollis; 2016 12:56:08 PM  Latex Recorded By: Alesha Hollis; 2016 12:56:08 PM Current Meds  Aleve-D Sinus  Cold TB12;Therapy: 
(Recorded:2017) to Recordedld 10/9/17 am  Aspirin  MG Oral Tablet 
Delayed Release; take one pill po every day;Therapy: 99Rwb2995 to 
(Evaluate:2018) Recordedself prescribed  Lexapro 20 MG Oral Tablet;Therapy:
(Recorded:17Oin5042) to Recorded  Magnesium Oxide 400 MG Oral Capsule;Therapy: 
52Ops1218 to Recorded  Metrogel GEL;Therapy: (Recorded:17Teq9823) to Recorded  
Minocycline HCl CAPS;Therapy: (Recorded:67Vmn7804) to Recorded  Morphine Sulfate
ER 15 MG Oral Tablet Extended Release; TAKE 1 TABLET EVERY 8HOURS MDD:3;Therapy:
53Zbf0827 to (Evaluate:16Cyw8022)  Requested for: 2020; LastRx:2020 
OrderedLD 2020  oxyCODONE-Acetaminophen  MG Oral Tablet; TAKE 1 TABLET
3 times daily PRNPAIN MDD:3;Therapy: 06Pzk2463 to (Evaluate:56Zli5376)  
Requested for: 2020; LastRx:2020 OrderedLD 2020  Potassium 
Gluconate 595 (99 K) MG Oral Tablet;Therapy: (Recorded:2016) to Recorded  
Protonix 40 MG Oral Tablet Delayed Release;Therapy: 2018 to Recorded  
Wellbutrin TABS;Therapy: (Recorded:2018) to Recorded Past Medical History  
History of Chronic headaches (784.0) (R51)  Denied: History of blood coagulation
disorder  History of degenerative disc disease (V13.59) (Z87.39)  History of 
depression (V11.8) (Z86.59)  History of fibromyalgia (V13.59) (Z87.39)  Assessed
By: Arabella Brown (Pain Management); Last Assessed: 2017  History of 
hepatitis A virus infection (V12.09) (Z86.19)  History of kidney stones (V13.01)
(Z87.442)  History of osteoarthritis (V13.4) (Z87.39)  Assessed By: Arabella Brown
(Pain Management); Last Assessed: 2017  History of small bowel 
obstruction (V12.79) (Z87.19)    History of Not currently pregnant (V49.89) 
(Z78.9)  Denied: History of On anticoagulant therapy Surgical History  History 
of Appendectomy  3/2013  History of Back Surgery  2015    C5 disk replacement 
History of Gastric Surgery For Morbid Obesity    History of Hernia Repair  
2013  History of Hip Replacement Left  History of Hysterectomy  3/2004  
Denied: History of Implantable Cardioverter-Defibrillator  History of Knee 
Arthroplasty  History of Knee Replacement  2005  History of Knee Surgery  
2015  Denied: History of Pacemaker Placement  History of Total Hip Replacement
  Family History  Family history of arthritis (V17.7) (Z82.61)  Family 
history of cardiac disorder (V17.49) (Z82.49)  Family history of cardiac 
disorder (V17.49) (Z82.49)  Family history of diabetes mellitus (V18.0) (Z83.3) 
Family history of malignant neoplasm (V16.9) (Z80.9) Social History  Current 
non-drinker of alcohol (V49.89) (Z78.9)  Denied: History of Drug use    
Never a smoker  Not currently employed VitalsVital Signs Recorded: 31Drs3650 
03:39PM Height: 5 ft 6 inWeight: 212 lb BMI Calculated: 34.22BSA Calculated: 
2.05Systolic: 125, SittingDiastolic: 70, SittingHeart Rate: 61Respiration: 
16Temperature: 96.4 FHeight measured w/wo shoes: w/shoesPain Scale: 6 Physical 
ExamGeneral: The patient is a well nourished/well developed, female, heavy set, 
who is in mild distress and appears stated age. Eyes: Lids are atraumatic, no 
lesions, sclerae are anicteric. Ears, Nose, Mouth, Throat: external ears and 
nose without trauma. Respiratory: Normal chest expansion and respiratory effort.
Cervical Spine:- Palpation/Tenderness: Tenderness on palpation of the left: 
negative left paraspinal and negative left trapezius muscle. Tenderness on 
palpation of the RIGHT: negative right paraspinal and negative right trapezius 
muscle. - ROM:. Range of motion increases pain.Right Upper Extremity Motor: - 
Wrist: 5/5 flexion, 5/5 extension- Elbow: 5/5 flexion, 5/5 extension- Shoulder: 
5/5 Abduction, 5/5 Adduction- Hand  5/5 Left Upper Extremity Motor:- Wrist: 
5/5 flexion, 5/5 extension- Elbow: 5/5 flexion, 5/5 extension- Shoulder: 5/5 
Abduction, 5/5 Adduction- Hand  5/5 Neurological:Sensation Upper:Left Upper:
numbnesRight Upper: normal. Lumbosacral Spine: - Inspection: normal appearance. 
No deformity, ecchymosis, erythema or swelling noted. Normal Lordosis.. - 
Palpation/Tenderness: Tenderness on palpation of the LEFT: paraspinal  and 
sacroiliac joint, but negative left sciatic notch. - Palpation/Tenderness: 
Tenderness on palpation of the RIGHT: paraspinal  and sacroiliac joint, but 
negative right sciatic notch. - ROM Flexion: was painful. - ROM Extension: was 
painful.Right Lower Extremity Motor:- Hip: 4/4 flexion, 4/5 extension- Knee: 4/5
flexion, 4/5 extension- Foot: 5/5 Plantar , 5/5 DorsiFlexionSpecial Tests: flip 
test was negative, positive facet challenge  and positive faberLeft Lower 
Extremity Motor:- Hip: 4/5 flexion, 4/5 extension- Knee: 4/5 flexion, 4/5 
extension- Foot: 5/5 DorsiFlexionSpecial Tests: flip test was negative, positive
facet challenge  and positive faberNeurological:Sensation Left Lower: 
normalSensation Right Lower: normal. Skin: Warm, dry, acyanotic. Psychological: 
Alert and oriented to person, place and time. Mood and affect are pleasant and 
appropriate. Judgement intact. Insight normal without delusions or halluci
nations.  Denies suicidal/homicidal ideation.   Results/DataThis patient had a 
urine drug screen on 2020. Results are in compliance.   Assessment 1. 
Chronic low back pain (724.2,338.29) (M54.5,G89.29) 2. Facet arthropathy, lumbar
(721.3) (M47.816) 3. Lumbar radiculopathy (724.4) (M54.16) 4. Spondylolisthesis,
lumbar region (738.4) (M43.16) Plan 1. Renew: Morphine Sulfate ER 15 MG Oral 
Tablet Extended Release; TAKE 1 TABLET EVERY 8 HOURS MDD:3LD 2020 2. Renew: 
oxyCODONE-Acetaminophen  MG Oral Tablet; TAKE 1 TABLET 3 times daily PRN 
PAIN MDD:3LD 2020 3. UDS-LAB Medicare / Commercial ([facility] Urine Drug [facility]reen);
[Do Not Release]; Specimen Source:Urine; Status:In Progress - Specimen/Data 
Collected;   Done: 04Zlt0762 4. 30 Day RX Management  Follow-up  Status: Hold 
For - Scheduling  Requested for: 20Odq9462). Schedule (FUP) 60 days from today: 
: 11Ugg9311). Is an additional appointment needed....? : Yes). Schedule 30 day 
RX from TODAY's date (2 days +/- either way): : 24Zxc0076 In my opinion based on
subjective and objective findings from today's visit the patient is worsening. 
Medication:.  [place]  Information:  was consulted by my designee and I have 
reviewed the information presented to me and find no aberrant compliance issues.
Patient has been informed.  Controlled Substance Prescribed: MORPHINE ER, 
PERCOCET . CONTROLLED SUBSTANCE INFORMATION: I advised the patient today/pr
eviously regarding treatment with the above controlled substance and/or 
narcotic. The patient was then informed of the risks, benefits, and alternatives
of the narcotic. The risks discussed included but were not limited to physical 
and/or psychological dependence, tolerance of the medication, drowsiness, 
sleepiness, balance/coordination problems, confusion, allergic reaction or any 
other abnormal symptoms. The patient was advised and agreed to use the 
medication only as prescribed, and not to drive, or operate heavy equipment or 
machinery. The patient understood and consented to both undergo a narco
tic/opiate regimen and agreed to sign and comply with all of the [facility] terms of a controlled substance treatment and agreement. 
The patient is on the lowest possible dose of opioids. The patient denies 
adverse side effects and does not demonstrate any aberrant drug taking 
behaviors. The patient is able to perform ADL's  including the following 
activities for longer periods of time with less pain: activities of daily 
living, sleeping, walking, standing, sitting, grocery shopping . There are no 
changes in current medications at this visit. Patient instructed to continue c
urrent regimen.  The patient is on the lowest possible dose of opioids. The 
patient denies adverse side effects and does not demonstrate any aberrant drug 
taking behaviors. The patient is able to perform ADL's  including the following 
activities for longer periods of time with less pain: activities of daily 
living, sleeping, walking, standing, sitting, grocery shopping . The prescribed 
medications are medically necessary for pain management and rehabilitation. UDS:
This is an established patient and is on ongoing opioid therapy. A UDS is being 
obtained today, the patient has previously consented to UDS as part of the 
Controlled Substance and Treatment Agreement. The reason for today's UDS is: 
patient was selected at random and scored as moderate risk on the opioid risk 
assessment. Creatinine has been ordered as well for specimen validity, not for 
kidney function. Preliminary UDS results are not final and should not be used to
determine patient care or plan of treatment. Initially a qualitative immunoassay
screen will be done. Any positive findings or negative findings that are out of 
compliance will be further tested with a more comprehensive quantitative 
confirmation LCMS study. It is part of the normal prescribing protocol of 
controlled substances and is considered standard of care.  Treatment includes: 
PROCEDURE(S): The patient defers blocks/procedures at this time THERAPIES: 
Therapy Treatment Plan: Deferred: All Therapies: Deferred: per patient request. 
EMG-NERVE CONDUCTION STUDY UPPER EXTREMITY:  (EMG RECOMMENDED SHE DEFERS AT THIS
TIME) FOLLOW UP: The patient should have a follow up 30 Day RX. CONTINUE 
TREATMENT: Yusra will continue with the following:. Yusra is participating in a 
home exercise program and is encouraged to continue. - : The patient was 
counseled on the following:  treatment plan and future treatment options.       
Discussion/SummaryYusra is seen for her neck, low back and limb pain. This has 
been worsening over the last few months. The increase in pain may be due to 
taking care of her grand kids almost full time and stress.  She feels unable to 
do a block until she is sure that she will not be called upon to care for her 
grand children so for now she needs to wait. The meds help at current doses that
are a bit higher than before. The goal will be to do a block and reduce the pain
meds she hopes that by September she will know if she can do a block. She will 
be seen in a month. She also could benefit from an emg for her numbness in her 
left arm ...she also defers this.   Signatures Electronically signed by : Arabella Brown NP; 2020  5:20PM EST                       (Author)  
Electronically signed by : Fabricio Camacho MD; Jul 10 2020 10:05AM EST             
         









          Name      Value     Range     Interpretation Code Description Data Alla

rce(s) Supporting 

Document(s)

 

                                                                       









                    ID                  Date                Data Source

 

                    [id number]            2020 08:27:02 AM EDT [facility]

 

                                        [facility], PCName: Kandis kay ChildsDOB: 1960Provider: Loulou Brown: 2020 Chief Complaint 2Patient presents with low back pain and 
thigh pain on the left side.  She is also having some pain down her left arm to 
the tip of middle finger.  MA completing section:  Explanation to patient 
regarding telemedicine Patient initiated contact with the office via phone call 
or via patient portal to schedule a Telehealth visit. I explained to the patient
that telemedicine is the practice of using telecommunications technology to 
evaluate, diagnose and care for patients at a distance. Telehealth visits are a 
way for Health system to continue providing quality care to our patients while still 
practicing mandated social distancing, in emergency effort to keep both the 
patient and myself safe throughout the COVID-19 pandemic. During this emergent 
initiation of Telemedicine, the office of civil rights has loosened the laws on 
HIPAA compliance to accommodate the continuation of medical care across the [place]
throughout the COVID-19 pandemic.  Telehealth Consent Yusra Dietz is an 
established patient of Health system, and was made aware co-pays and co-insurance may be
waived by their insurer due to COVID-19, as well as, the potential privacy risks
with the use of third-party applications as a result of this Telehealth visit. 
The patient has verbalized consent to proceed with a Telehealth visit via audio 
with live video calling. Location of Provider and Patient for this telehealth 
visit: Provider is located at my residence in [facility] Patient is located at home, in
[place], [place] The following were present during this visit: patient was alone 
Application used for the telehealth visit: [employer]   History of Present 
IllnessThe patient is being seen today for refill of prescriptions for 
controlled substances.      The date of onset of symptoms is approximately 
YEARS.   Current Meds 1. Aleve-D Sinus  Cold TB12; Therapy: (Recorded:2017)
to Recorded 2. Aspirin  MG Oral Tablet Delayed Release; take one pill po 
every day; Therapy: 62Ndw8188 to (Evaluate:2018) Recorded 3. Lexapro 20 MG 
Oral Tablet; Therapy: (Recorded:35Svy4436) to Recorded 4. Magnesium Oxide 400 MG
Oral Capsule; Therapy: 84Olf2190 to Recorded 5. Metrogel GEL; Therapy: 
(Recorded:63Fwz4430) to Recorded 6. Minocycline HCl CAPS; Therapy: 
(Recorded:78Wyc9176) to Recorded 7. Morphine Sulfate ER 15 MG Oral Tablet 
Extended Release; TAKE 1 TABLET EVERY 8 HOURS MDD:3; Therapy: 68Cwo4473 to 
(Evaluate:31Lsd6175)  Requested for: 2020; Last Rx:2020 Ordered 8. 
oxyCODONE-Acetaminophen  MG Oral Tablet; Take 1 tablets every 12 hours as 
needed for pain MDD:2; Therapy: 53Wgu8257 to (Evaluate:2020)  Requested 
for: 2020; Last Rx:2020 Ordered 9. Potassium Gluconate 595 (99 K) MG 
Oral Tablet; Therapy: (Recorded:2016) to Recorded 10. Protonix 40 MG Oral 
Tablet Delayed Release;  Therapy: 2018 to Recorded 11. Wellbutrin TABS;  
Therapy: (Recorded:2018) to Recorded Allergies  Morphine Derivatives 
Itching; Updated By: Geno Zapata; 2018 3:14:52 PMonly iv 
morphineDenied   Adhesive Tape Recorded By: Alesha Hollis; 2016 12:56:08 
PM  Iodinated Contrast Media Recorded By: Alesha Hollis; 2016 12:56:08 PM 
Latex Recorded By: Alesha Hollis; 2016 12:56:08 PM NotesNYSW 30 Day RX 
Note: Chief complaint: Patient is here today for 30 day refill of their 
controlled substance prescription Patient is being treated with chronic opioid 
therapy for low back and leg pain and now some arm pain too.  Patient denies 
side effects related to chronic opioid use and has been re-educated regarding 
e controlled substance agreement. In my opinion patient continues to receive 
primary benefit with chronic opioid therapy. MORPHINE ER, OXYCODONE . CONTROLLED
SUBSTANCE INFORMATION: I advised the patient today/previously regarding 
treatment with the above controlled substance and/or narcotic. The patient was 
then informed of the risks, benefits, and alternatives of the narcotic. The 
risks discussed included but were not limited to physical and/or psychological 
dependence, tolerance of the medication, drowsiness, sleepiness, 
balance/coordination problems, confusion, allergic reaction or any other abnorm
al symptoms. The patient was advised and agreed to use the medication only as 
prescribed, and not to drive, or operate heavy equipment or machinery. The 
patient understood and consented to both undergo a narcotic/opiate regimen and 
agrees to comply with all of the [facility] terms of a 
controlled substance treatment and agreement.  I am giving the patient a 30 day 
refill without changes. The patient consents to move forward with treatment. 
...OPIOID ABUSE is a national epidemic that Physicians and other prescribers 
have the power to help prevent. In our opioid monitoring surveillance to help 
minimize misuse and diversion, moderate to high risk patients are required to 
have face to face 30 day refill of opioids. This will help minimize the 
potential for electronic false requests, deterring potential fraudulent 
behavior. [facility]  Information:  was consulted by my designee and I have 
reviewed the information presented to me and find no aberrant compliance issues.
Patient has been informed.    Physical ExamGeneral: The patient is a well 
nourished/well developed, female, heavy set, who is in mild distress and appears
stated age. Eyes: Lids are atraumatic, no lesions, sclerae are anicteric. Ears, 
Nose, Mouth, Throat: external ears and nose without trauma. Respiratory: Normal 
chest expansion and respiratory effort. Psychological: Alert and oriented to 
person, place and time. Mood and affect are pleasant and appropriate. Judgement 
intact. Insight normal without delusions or hallucinations.  Denies 
suicidal/homicidal ideation.   Results/DataNYSW UDS Prior UDS Results Detail For
m: This patient had a urine drug screen on 2020. Results are in compliance. 
 Assessment 1. Chronic low back pain (724.2,338.29) (M54.5,G89.29) 2. Lumbar 
radiculopathy (724.4) (M54.16) 3. Lumbar spondylosis (721.3) (M47.816) 4. 
Spondylolisthesis, lumbar region (738.4) (M43.16) Plan 1. Renew: Morphine 
Sulfate ER 15 MG Oral Tablet Extended Release; TAKE 1 TABLET EVERY 8 HOURS 
MDD:-3/5/2020 11AM 2. Renew: oxyCODONE-Acetaminophen  MG Oral Tablet; 
TAKE 1 TABLET 3 times daily PRN PAIN MDD:3LD-3/5/2020  11AM [facility] Treatment Plan 
(2): In my opinion based on subjective and objective findings from today's visit
the patient is worsening. Medication:. Due to the current COVID-19 pandemic, 
telemedicine does not allow for the collection of urine drug screening for 
initiation or chronic use of opioid medications. Patient will be subject to 
urine drug screening at next in-office visit. Medication list was reviewed with 
the patient, and updates were made to reflect her current medication regimen.  
Allergy list was reviewed with patient, and any necessary changes were made.    
[facility]  Information:  was consulted by my designee and I have reviewed the 
information presented to me and find no aberrant compliance issues. Patient has 
been informed.  Controlled Substance Prescribed: MORPHINE ER, OXYCODONE . 
CONTROLLED SUBSTANCE INFORMATION: I advised the patient today/previously 
regarding treatment with the above controlled substance and/or narcotic. The 
patient was then informed of the risks, benefits, and alternatives of the 
narcotic. The risks discussed included but were not limited to physical and/or 
psychological dependence, tolerance of the medication, drowsiness, sleepiness, 
balance/coordination problems, confusion, allergic reaction or any other 
abnormal symptoms. The patient was advised and agreed to use the medication only
as prescribed, and not to drive, or operate heavy equipment or machinery. The 
patient understood and consented to both undergo a narcotic/opiate regimen and 
agreed to sign and comply with all of the [facility] 
terms of a controlled substance treatment and agreement. The patient is on the 
lowest possible dose of opioids. The patient denies adverse side effects and 
does not demonstrate any aberrant drug taking behaviors. The patient is able to 
perform ADL's  including the following activities for longer periods of time 
with less pain: activities of daily living, sleeping, walking, standing, 
sitting, grocery shopping . There are no changes in current medications at this 
visit. Patient instructed to continue current regimen.  The patient is on the 
lowest possible dose of opioids. The patient denies adverse side effects and 
does not demonstrate any aberrant drug taking behaviors. The patient is able to 
perform ADL's  including the following activities for longer periods of time 
with less pain: activities of daily living, sleeping, walking, standing, 
sitting, grocery shopping . The prescribed medications are medically necessary 
for pain management and rehabilitation. Treatment includes: PROCEDURE(S): The 
patient defers blocks/procedures at this time THERAPIES: Therapy Treatment Plan:
Deferred: All Therapies: Deferred: per patient request. FOLLOW UP: The patient 
should have a follow up visit in 1 month. CONTINUE TREATMENT: Yusra will continue
with the following:. Yusra is participating in a home exercise program and is 
encouraged to continue. - : The patient was counseled on the following:  
treatment plan and future treatment options. TIme:. Time: Start time: 15:46 P.M.
End time: 15:58. P.M.        Discussion/SummaryJoan is seen today for her low 
back leg and now arm pain. She has been taking care of both of her grand kids 
age 8 months and 5 yrs almost constantly due to her son being called in to work 
the riots that have been occurring.  The kids mother is in the hospital at this 
time. She states that she can not get a block for this reason and is asking for 
an increase in her short acting pain meds.  i have explained that we really do 
try to limit those meds to no more than mdd 2 a day. This this month we will go 
up to mdd 3 but she is aware that after this month she has to find some time to 
get a block she is over do for the RF procedure. She understands that the pain 
meds are not a solution to the pain and that she really will have to consider 
doing some injections they have worked in the past. She is also having some new 
arm pain and pain into her fingers which will need to be worked up next visit 
too.   Signatures Electronically signed by : Arabella Brown NP; 2020  
4:04PM EST                       (Author)  Electronically signed by : Cristian Stovall MD; 2020  8:26AM EST                       









          Name      Value     Range     Interpretation Code Description Data Alla

rce(s) Supporting 

Document(s)

 

                                                                       









                    ID                  Date                Data Source

 

                    [id number]            2020 06:52:40 PM EDT [facility]

 

                                        [facility] PCName: Kandisdagoberto kay ChildsDOB: 1960Provider: Loulou Brown: 2020 Chief ComplaintPatient presents with low back and leg and 
joint pain  Chief Complaint 2Patient presents with neck  pain [facility] VAS PAIN 
Established:  MA completing section:  [facility] Telemedicine - Explanation to patient
regarding Telemedicine: Explanation to patient regarding telemedicine Patient 
initiated contact with the office via phone call or via patient portal to 
schedule a Telehealth visit. I explained to the patient that telemedicine is the
practice of using telecommunications technology to evaluate, diagnose and care 
for patients at a distance. Telehealth visits are a way for Health system to continue 
providing quality care to our patients while still practicing mandated social 
distancing, in emergency effort to keep both the patient and myself safe 
throughout the COVID-19 pandemic. During this emergent initiation of 
Telemedicine, the office of civil rights has loosened the laws on HIPAA 
compliance to accommodate the continuation of medical care across the [place] 
throughout the COVID-19 pandemic.  [facility] Telemedicine - TV Consent Established: 
Telehealth Consent Yusra Dietz is an established patient of Health system, and was made 
aware co-pays and co-insurance are waived due to COVID-19, as well as, the 
potential privacy risks with the use of third-party applications as a result of 
this Telehealth visit. The patient has verbalized consent to proceed with a 
Telehealth visit via audio with live video calling. Location of Provider and 
Patient for this telehealth visit: Provider is located at my residence in [facility] 
Patient is located at home, in [place], [place]  The following were present during 
this visit: patient was alone Application used for the telehealth visit: Whats 
[place]   History of Present IllnessDo you have a Brace for your condition? Patient 
does not have a brace for their condition. Do you have a TENS Unit for your 
condition? Patient does not have a TENS Unit for their condition. Supplements: 
Patient is currently taking the following supplements: (see current med list). 
Nerve Conduction: Patient has had a Nerve Conduction Test. Recent 
test/procedures: Patient was asked and denies having any tests since their last 
visit. Patient was asked and denies being seen by any Physicians since their 
last visit. The patient was last seen by a [facility] provider 
on 2020. At today's visit patient presents with their Self Patient is not 
currently working. The patient is being seen for a follow-up.       Pain 
Duration: years   Pain Quality: (Neuropathic)  burning, tingling and pins and ne
edles Pain Quality: (Nociceptive)  aching, dull, sharp and stabbing Timing:  
constant Progression:  worsening Palliation:  block, changing position, 
exercises, non-opioid analgesics, opioid analgesics and stretching Exacerbating:
 bending, climbing stairs, house chores, lifting, standing, walking and weather 
Pain Score:  a current pain level of 7/10, a minimum pain level of 4/10 and a 
maximum pain level of 9/10. Condition type: The patient is being seen for a 
chronic condition. PAIN LOCATION:  the pain is located in the neck,  radiates to
the right shoulder and left shoulder, the pain is located in the low back ,  
(weakness , achy and throbbing pain) radiates to the right buttock, left 
buttock, right hip, left hip, right thigh, right knee, right calf/shin, right 
ankle, right big toe and right pinky toe, the pain is greater on the right side 
more than the left and the pain is in the leg equally with the back. The 
etiology of this injury/condition is unknown. RELATIONSHIP TO INJURY: This 
condition is not related to a specific injury. INJURY MECHANISM: The injury 
resulted from  no known physical event. REVIEW OF PAST DIAGNOSTICS: have 
included:  MRI and electromyography/nerve conduction studies . Records were 
obtained, reviewed and on file. PAST TREATMENT has included:  NONSTEROIDAL ANTI-
INFLAMMATORY drugs (not effective) Includes Mobic., but ANTICONVULSANTS 
(effective) Includes Lyrica., OPIOID ANALGESICS (effective) Includes. 
hydrocodone does not work. fentanyl patch seems to be working well., cervical 
surgery 2015 (effective). - Radio Frequency Pertinent Information: On a 
RADIO FREQUENCY ABLATION of the BILATERAL, LUMBAR FACET JOINTS under fluoroscopy
as confirmed by previous successful medial branch nerve blocks. Targeting the.  
80 % of pain relief was provided which is ongoing. Allows patient to increase 
activity and functionality Including the following activities for longer periods
of time with less pain: activities of daily living, better sleep, walking, 
standing, sitting and grocery shop. And to decrease the following pain 
medication(s): 19, 19 DR. RASHID. Good reduction of pain in the low back
and able to walk better. INTERVAL EVENTS: include . She is doing ok for the most
part taking care of her grand kids so this is keeping her busy. She states that 
she is too busy to worry about herself. The meds really do help. ASSOCIATED 
SYMPTOMS: include difficulty sleeping , difficulty walking, radiating, extremity
weakness:  left, lower extremity, right. and urinary incontinence, but no fecal 
incontinence. FUNCTIONAL LIMITATIONS: The patient's functional status is limited
as follows: ability to  perform activities of daily living, participate in 
aerobic activity, participate in hobbies, perform housework and maintain normal 
sleep pattern. MEDICATION SIDE EFFECTS experienced by patient are:  drowsiness. 
 Review of SystemsROS was reviewed with patient; I feel the ROS to be 
negative/normal other than  low back , neck and joint pain.    Patient maintains
at today's visit there has been no change in his/her hematologic history.     
Active Problems 1. Chronic hip pain, left (719.45,338.29) (M25.552,G89.29) 2. 
Chronic low back pain (724.2,338.29) (M54.5,G89.29) 3. Facet arthropathy, lumbar
(721.3) (M47.816) 4. History of depression (V11.8) (Z86.59) 5. History of 
fibromyalgia (V13.59) (Z87.39) 6. History of osteoarthritis (V13.4) (Z87.39) 7. 
Long term current use of opiate analgesic (V58.69) (Z79.891) 8. Lumbar 
radiculopathy (724.4) (M54.16) 9. Lumbar spondylosis (721.3) (M47.816) 10. 
Osteoarthritis of hip (715.95) (M16.9) 11. Primary osteoarthritis of hip 
(715.15) (M16.10) 12. Spondylolisthesis, lumbar region (738.4) (M43.16) 
Allergies  Morphine Derivatives Itching; Updated By: Geno Zapata; 
2018 3:14:52 PMonly iv morphineDenied   Adhesive Tape Recorded By: 
Alesha Hollis; 2016 12:56:08 PM  Iodinated Contrast Media Recorded By: 
Alesha Hollis; 2016 12:56:08 PM  Latex Recorded By: Alesha Hollis; 
2016 12:56:08 PM Current Meds  Aleve-D Sinus  Cold TB12;Therapy: 
(Recorded:2017) to Recordedld 10/9/17 am  Aspirin  MG Oral Tablet 
Delayed Release; take one pill po every day;Therapy: 42Qkk3106 to 
(Evaluate:2018) Recordedself prescribed  Lexapro 20 MG Oral Tablet;Therapy:
(Recorded:97Qdx1380) to Recorded  Magnesium Oxide 400 MG Oral Capsule;Therapy: 
44Inc0283 to Recorded  Metrogel GEL;Therapy: (Recorded:53Rfg8909) to Recorded  
Minocycline HCl CAPS;Therapy: (Recorded:35Igp6989) to Recorded  Morphine Sulfate
ER 15 MG Oral Tablet Extended Release; TAKE 1 TABLET EVERY 8HOURS MDD:3;Therapy:
33Eqd0028 to (Evaluate:2020)  Requested for: 55Uun7323; LastRx:37Moi9651 
OrderedLD-3/5/2020 11AM  oxyCODONE-Acetaminophen  MG Oral Tablet; Take 1 
tablets every 12 hours asneeded for pain MDD:2;Therapy: 81Rod3261 to 
(Evaluate:2020)  Requested for: 84Arn4177; LastRx:08Ijh2650 OrderedLD-
3/5/2020  11AM  Potassium Gluconate 595 (99 K) MG Oral Tablet;Therapy: 
(Recorded:2016) to Recorded  Protonix 40 MG Oral Tablet Delayed 
Release;Therapy: 2018 to Recorded  Wellbutrin TABS;Therapy: 
(Recorded:2018) to Recorded Past Medical History  History of Chronic 
headaches (784.0) (R51)  Denied: History of blood coagulation disorder  History 
of degenerative disc disease (V13.59) (Z87.39)  History of depression (V11.8) 
(Z86.59)  History of fibromyalgia (V13.59) (Z87.39)  Assessed By: Arabella Brown 
(Pain Management); Last Assessed: 2017  History of hepatitis A virus 
infection (V12.09) (Z86.19)  History of kidney stones (V13.01) (Z87.442)  
History of osteoarthritis (V13.4) (Z87.39)  Assessed By: Arabella Brown (Pain 
Management); Last Assessed: 2017  History of small bowel obstruction 
(V12.79) (Z87.19)    History of Not currently pregnant (V49.89) (Z78.9)  
Denied: History of On anticoagulant therapy Surgical History  History of 
Appendectomy  3/2013  History of Back Surgery  2015    C5 disk replacement  
History of Gastric Surgery For Morbid Obesity    History of Hernia Repair  
2013  History of Hip Replacement Left  History of Hysterectomy  3/2004  
Denied: History of Implantable Cardioverter-Defibrillator  History of Knee 
Arthroplasty  History of Knee Replacement  2005  History of Knee Surgery  
2015  Denied: History of Pacemaker Placement  History of Total Hip Replacement
  Family History  Family history of arthritis (V17.7) (Z82.61)  Family 
history of cardiac disorder (V17.49) (Z82.49)  Family history of cardiac 
disorder (V17.49) (Z82.49)  Family history of diabetes mellitus (V18.0) (Z83.3) 
Family history of malignant neoplasm (V16.9) (Z80.9) Social History  Current 
non-drinker of alcohol (V49.89) (Z78.9)  Denied: History of Drug use    
Never a smoker  Not currently employed Physical ExamGeneral: The patient is a 
well nourished/well developed, female, heavy set, who is in mild distress and a
ppears stated age. Eyes: Lids are atraumatic, no lesions, sclerae are anicteric.
currently wearing eyeglasses. Ears, Nose, Mouth, Throat: external ears and nose 
without trauma. Psychological: Alert and oriented to person, place and time. 
Mood and affect are pleasant and appropriate. Judgement intact. Insight normal 
without delusions or hallucinations.  Denies suicidal/homicidal ideation.   
Results/DataThis patient had a urine drug screen on 2020. Results are in 
compliance.   Assessment 1. Lumbar radiculopathy (724.4) (M54.16) 2. Chronic low
back pain (724.2,338.29) (M54.5,G89.29) 3. Facet arthropathy, lumbar (721.3) 
(M47.816) 4. Lumbar spondylosis (721.3) (M47.816) Plan 1. Renew: Morphine 
Sulfate ER 15 MG Oral Tablet Extended Release; TAKE 1 TABLET EVERY 8 HOURS 
MDD:3LD-3/5/2020 11AM 2. Renew: oxyCODONE-Acetaminophen  MG Oral Tablet; 
Take 1 tablets every 12 hours as needed for pain MDD:2LD-3/5/2020  11AM 3. 30 
Day RX Management  Follow-up  Status: Hold For - Scheduling  Requested for: 
00Twj3087). Schedule (FUP) 60 days from today: : 20Vcb1518). Is an additional 
appointment needed....? : Yes). Schedule 30 day RX from TODAY's date (2 days +/-
either way): : 2020 In my opinion based on subjective and objective 
findings from today's visit the patient is worsening. Medication:. Due to the 
current COVID-19 pandemic, telemedicine does not allow for the collection of 
urine drug screening for initiation or chronic use of opioid medications. 
Patient will be subject to urine drug screening at next in-office visit. 
Medication list was reviewed with the patient, and updates were made to reflect 
her current medication regimen.  Allergy list was reviewed with patient, and any
necessary changes were made.    [facility]  Information:  was consulted by my 
designee and I have reviewed the information presented to me and find no 
aberrant compliance issues. Patient has been informed.  Controlled Substance 
Prescribed: MORPHINE ER, PERCOCET . CONTROLLED SUBSTANCE INFORMATION: I advised 
the patient today/previously regarding treatment with the above controlled 
substance and/or narcotic. The patient was then informed of the risks, benefits,
and alternatives of the narcotic. The risks discussed included but were not 
limited to physical and/or psychological dependence, tolerance of the 
medication, drowsiness, sleepiness, balance/coordination problems, confusion, 
allergic reaction or any other abnormal symptoms. The patient was advised and 
agreed to use the medication only as prescribed, and not to drive, or operate 
heavy equipment or machinery. The patient understood and consented to both und
ergo a narcotic/opiate regimen and agreed to sign and comply with all of the [facility] terms of a controlled substance treatment and 
agreement. The patient is on the lowest possible dose of opioids. The patient 
denies adverse side effects and does not demonstrate any aberrant drug taking 
behaviors. The patient is able to perform ADL's  including the following 
activities for longer periods of time with less pain: activities of daily 
living, sleeping, walking, standing, sitting, grocery shopping . There are no 
changes in current medications at this visit. Patient instructed to continue 
current regimen.  The patient is on the lowest possible dose of opioids. The 
patient denies adverse side effects and does not demonstrate any aberrant drug 
taking behaviors. The patient is able to perform ADL's  including the following 
activities for longer periods of time with less pain: activities of daily 
living, sleeping, walking, standing, sitting, grocery shopping . The prescribed 
medications are medically necessary for pain management and rehabilitation. 
Treatment includes: PROCEDURE(S): The patient defers blocks/procedures at this 
time THERAPIES: Therapy Treatment Plan: Deferred: All Therapies: Deferred: per 
patient request. FOLLOW UP: The patient should have a follow up 30 Day RX. 
CONTINUE TREATMENT: Yusra will continue with the following:. Yusra is 
participating in a home exercise program and is encouraged to continue. - 
: The patient was counseled on the following:  treatment plan and future 
treatment options. TIme:. Time: Start time: 15:33 P.M. End time: 15:45. P.M.    
   Discussion/SummaryYusra is seen today via telehealth video for her low back 
and joint pain and now neck pain. This was done due to Covid 19 and she did 
consent to the visit and stated her name and date of birth for the record.  She 
is having more pain and has been taking care of her grand kids so this is pretty
stressful. She states that she has been thinking about doing a block but at this
time she defers this also. She states that the meds take the edge off and are 
helpful. She is staying pretty active. I have again reviewed that she is 
potentially a MILD candidate and she know this. She defers things and will be 
seen in a month.   Signatures Electronically signed by : Arabella Brown NP; May  6
2020  3:50PM EST                       (Author)  Electronically signed by : Cristian Stovall MD; May  7 2020  6:52PM EST                       









          Name      Value     Range     Interpretation Code Description Data Alla

rce(s) Supporting 

Document(s)

 

                                                                       









                    ID                  Date                Data Source

 

                    [id number]            2020 02:51:37 PM EDT [facility] [place] [facility] PCName: Kandis DoshisDOB: 1960Provider: Ana MillerCEASAR: 2020 Chief Complaint 2Patient states she has back pain today 
Explanation to patient regarding telemedicine Patient initiated contact with the
office via phone call or via patient portal to schedule a Telehealth visit. I 
explained to the patient that telemedicine is the practice of using 
telecommunications technology to evaluate, diagnose and care for patients at a 
distance. Telehealth visits are a way for Health system to continue providing quality 
care to our patients while still practicing mandated social distancing, in 
emergency effort to keep both the patient and myself safe throughout the COVID-
19 pandemic. During this emergent initiation of Telemedicine, the office of 
civil rights has loosened the laws on HIPAA compliance to accommodate the 
continuation of medical care across the [place] throughout the COVID-19 pandemic.  
Telehealth Consent Yusra Dietz is an established patient of Health system, and was made 
aware co-pays and co-insurance are waived due to COVID-19, as well as, the 
potential privacy risks with the use of third-party applications as a result of 
this Telehealth visit. The patient has verbalized consent to proceed with a 
Telehealth visit via audio with live video calling. Location of Provider and 
Patient for this telehealth visit: Provider is located at my residence in [facility] 
Patient is located at home, in [place], [place] The following were present during 
this visit:  Application used for the telehealth visit: [place] [employer]   
History of Present IllnessThe patient is being seen today for refill of 
prescriptions for controlled substances.      The date of onset of symptoms is 
approximately years.   Current Meds 1. Aleve-D Sinus  Cold TB12; Therapy: 
(Recorded:2017) to Recorded 2. Aspirin  MG Oral Tablet Delayed 
Release; take one pill po every day; Therapy: 54Bvh1599 to (Evaluate:2018) 
Recorded 3. Lexapro 20 MG Oral Tablet (Escitalopram Oxalate); Therapy: 
(Recorded:38Xal2507) to Recorded 4. Magnesium Oxide 400 MG Oral Capsule; 
Therapy: 57Fjj5132 to Recorded 5. Metrogel GEL (MetroNIDAZOLE); Therapy: 
(Recorded:57Fle9842) to Recorded 6. Minocycline HCl CAPS; Therapy: 
(Recorded:64Wst2916) to Recorded 7. Morphine Sulfate ER 15 MG Oral Tablet 
Extended Release; TAKE 1 TABLET EVERY 8 HOURS MDD:3; Therapy: 93Fog4871 to 
(Evaluate:81Ize6401)  Requested for: 2020; Last Rx:2020 Ordered 8. 
oxyCODONE-Acetaminophen  MG Oral Tablet; Take 1 tablets every 12 hours as 
needed for pain MDD:2; Therapy: 03Jdk6127 to (Evaluate:77Eiq3812)  Requested 
for: 2020; Last Rx:2020 Ordered 9. Potassium Gluconate 595 (99 K) MG 
Oral Tablet; Therapy: (Recorded:2016) to Recorded 10. Protonix 40 MG Oral 
Tablet Delayed Release (Pantoprazole Sodium);  Therapy: 2018 to Recorded 
11. Wellbutrin TABS (BuPROPion HCl);  Therapy: (Recorded:2018) to Recorded 
Allergies  Morphine Derivatives Itching; Updated By: Geno Zapata; 
2018 3:14:52 PMonly iv morphineDenied   Adhesive Tape Recorded By: 
Alesha Hollis; 2016 12:56:08 PM  Iodinated Contrast Media Recorded By: 
Alesha Hollis; 2016 12:56:08 PM  Latex Recorded By: Alesha Hollis; 
2016 12:56:08 PM NotesNYSW 30 Day RX Note: Chief complaint: Patient is here 
today for 30 day refill of their controlled substance prescription Patient is 
being treated with chronic opioid therapy for BACK PAIN.  Patient denies side 
effects related to chronic opioid use and has been re-educated regarding the 
controlled substance agreement. In my opinion patient continues to receive 
primary benefit with chronic opioid therapy. MORPHINE ER, OXYCODONE . CONTROLLED
SUBSTANCE INFORMATION: I advised the patient today/previously regarding 
treatment with the above controlled substance and/or narcotic. The patient was 
then informed of the risks, benefits, and alternatives of the narcotic. The 
risks discussed included but were not limited to physical and/or psychological 
dependence, tolerance of the medication, drowsiness, sleepiness, 
balance/coordination problems, confusion, allergic reaction or any other 
abnormal symptoms. The patient was advised and agreed to use the medication only
as prescribed, and not to drive, or operate heavy equipment or machinery. The 
patient understood and consented to both undergo a narcotic/opiate regimen and 
agrees to comply with all of the [facility] terms of a 
controlled substance treatment and agreement.  I am giving the patient a 30 day 
refill without changes. The patient consents to move forward with treatment. ...
OPIOID ABUSE is a national epidemic that Physicians and other prescribers have 
the power to help prevent. In our opioid monitoring surveillance to help 
minimize misuse and diversion, moderate to high risk patients are required to 
have face to face 30 day refill of opioids. This will help minimize the 
potential for electronic false requests, deterring potential fraudulent 
behavior. [facility]  Information:  was consulted by my designee and I have 
reviewed the information presented to me and find no aberrant compliance issues.
Patient has been informed.    Physical ExamGeneral: The patient is a well nour
ished/well developed, female, with a medium build and who is in no acute 
distress. Eyes: Lids are atraumatic, no lesions, sclerae are anicteric. 
currently wearing eyeglasses. Ears, Nose, Mouth, Throat: external ears and nose 
without trauma. Respiratory: Normal chest expansion and respiratory effort. 
Psychological: Alert and oriented to person, place and time. Mood and affect are
pleasant and appropriate. Judgement intact. Insight normal without delusions or 
hallucinations.  Denies suicidal/homicidal ideation.   Results/DataNYSW UDS 
Prior UDS Results Detail Form: Results are not in compliance. Drug Compliance 
notified   Assessment 1. Chronic low back pain (724.2,338.29) (M54.5,G89.29) 2. 
Facet arthropathy, lumbar (721.3) (M47.816) Plan 1. Renew: Morphine Sulfate ER 
15 MG Oral Tablet Extended Release; TAKE 1 TABLET EVERY 8 HOURS MDD:3LD-3/5/2020
11AM 2. Renew: oxyCODONE-Acetaminophen  MG Oral Tablet; Take 1 tablets 
every 12 hours as needed for pain MDD:2LD-3/5/2020  11AM [facility] Treatment Plan 
(2): Medication:. Due to the current COVID-19 pandemic, telemedicine does not 
allow for the collection of urine drug screening for initiation or chronic use 
of opioid medications. Patient will be subject to urine drug screening at next 
in-office visit. Medication list was reviewed with the patient, and updates were
made to reflect her current medication regimen.  Allergy list was reviewed with 
patient, and any necessary changes were made.    [facility]  Information:  was 
consulted by my designee and I have reviewed the information presented to me and
find no aberrant compliance issues. Patient has been informed.  Controlled 
Substance Prescribed: MORPHINE ER, OXYCODONE . CONTROLLED SUBSTANCE INFORMATION:
I advised the patient today/previously regarding treatment with the above 
controlled substance and/or narcotic. The patient was then informed of the 
risks, benefits, and alternatives of the narcotic. The risks discussed included 
but were not limited to physical and/or psychological dependence, tolerance of 
the medication, drowsiness, sleepiness, balance/coordination problems, 
confusion, allergic reaction or any other abnormal symptoms. The patient was 
advised and agreed to use the medication only as prescribed, and not to drive, 
or operate heavy equipment or machinery. The patient understood and consented to
both undergo a narcotic/opiate regimen and agreed to sign and comply with all of
the [facility] terms of a controlled substance 
treatment and agreement.  There are no changes in current medications at this 
visit. Patient instructed to continue current regimen.  The prescribed 
medications are medically necessary for pain management and rehabilitation. 
Treatment includes: FOLLOW UP: The patient already has a follow-up appointment. 
TIme:. Time: Start time: 3:17 P.M. End time: 3:20. P.M.        
Discussion/SummaryPatient was seen for a telehealth visit today through the use 
of live video calling.  Yusra is a 60 yo female with chronic low  back pain, seen
today for her medication refill visit. Stable on her current medication regimen.
No changes made, refills provided. She has a follow up next month that she will 
keep.   Signatures Electronically signed by : Ana Milelr NP; 2020  
3:24PM EST                       (Author)  Electronically signed by : Fabricio Camacho MD; 2020  2:51PM EST                       









          Name      Value     Range     Interpretation Code Description Data Alla

rce(s) Supporting 

Document(s)

 

                                                                       









                    ID                  Date                Data Source

 

                    [id number]            2020 12:20:32 PM EST [place] [facility] PCName: Kandis

eliza RodolfoOB: 1960Provider: Loulou Brown: 2020 Chief ComplaintPatient presents with neck and low back pain
 Chief Complaint 2N[place]W VAS PAIN Established:  MA completing section: DB CNA   
History of Present IllnessDo you have a Brace for your condition? Patient does 
not have a brace for their condition. Recent test/procedures: Patient was asked 
and denies having any tests since their last visit. Patient was asked and denies
being seen by any Physicians since their last visit. At today's visit patient 
presents with their Self Patient is retired. What was patient's previous 
occupation? . The patient is being seen for a follow-up.        
Pain Quality: (Neuropathic)  burning and numbness Pain Quality: (Nociceptive)  
aching, dull and sharp Timing:  constant Progression:  unchanged Palliation:  
block, changing position, opioid analgesics, rest and resting/laying down 
Exacerbating:  standing, walking and exercise Pain Score:  a current pain level 
of 7/10, a minimum pain level of 6/10 and a maximum pain level of 10/10. 
Condition type: The patient is being seen for a chronic condition. PAIN 
LOCATION:  the pain is located in the low back ,  (weakness , achy and throbbing
pain) radiates to the right buttock, left buttock, right hip, left hip, right 
thigh, right knee, right calf/shin, right ankle, right big toe and right pinky 
toe, the pain is greater on the right side more than the left and the pain is in
the leg equally with the back. The etiology of this injury/condition is unknown.
RELATIONSHIP TO INJURY: This condition is not related to a specific injury. 
INJURY MECHANISM: The injury resulted from  no known physical event. REVIEW OF 
PAST DIAGNOSTICS: have included:  MRI and electromyography/nerve conduction 
studies . Records were obtained, reviewed and on file. PAST TREATMENT has 
included:  NONSTEROIDAL ANTI-INFLAMMATORY drugs (not effective) Includes Mobic.,
but ANTICONVULSANTS (effective) Includes Lyrica., OPIOID ANALGESICS (effective) 
Includes. hydrocodone does not work. fentanyl patch seems to be working well., 
cervical surgery 2015 (effective). - Radio Frequency Pertinent Information:
On a RADIO FREQUENCY ABLATION of the BILATERAL, LUMBAR FACET JOINTS under 
fluoroscopy as confirmed by previous successful medial branch nerve blocks. 
Targeting the.  80 % of pain relief was provided which is ongoing. Allows 
patient to increase activity and functionality Including the following 
activities for longer periods of time with less pain: activities of daily 
living, better sleep, walking, standing, sitting and grocery shop. And to 
decrease the following pain medication(s): 19, 19 DR. RASHID. Good 
reduction of pain in the low back and able to walk better. INTERVAL EVENTS: 
include . She is doing ok for the most part taking care of her grand kids so 
this is keeping her busy. She states that she is too busy to worry about 
herself. The meds really do help. ASSOCIATED SYMPTOMS: include difficulty 
sleeping , difficulty walking, radiating, extremity weakness:  left, lower 
extremity, right. and urinary incontinence, but no fecal incontinence. 
FUNCTIONAL LIMITATIONS: The patient's functional status is limited as follows: 
ability to  perform activities of daily living, participate in aerobic activity,
participate in hobbies, perform housework and maintain normal sleep pattern. 
MEDICATION SIDE EFFECTS experienced by patient are:  drowsiness.   Review of 
SystemsROS was reviewed with patient; documented on established patient 
questionnaire dated 3-5-2020. I feel the ROS to be negative/normal other than  
neck back joint pain.    Patient maintains at today's visit there has been no 
change in his/her hematologic history.     Active Problems 1. Chronic hip pain, 
left (719.45,338.29) (M25.552,G89.29) 2. Chronic low back pain (724.2,338.29) 
(M54.5,G89.29) 3. Facet arthropathy, lumbar (721.3) (M47.816) 4. History of 
depression (V11.8) (Z86.59) 5. History of fibromyalgia (V13.59) (Z87.39) 6. 
History of osteoarthritis (V13.4) (Z87.39) 7. Long term current use of opiate 
analgesic (V58.69) (Z79.891) 8. Lumbar radiculopathy (724.4) (M54.16) 9. Lumbar 
spondylosis (721.3) (M47.816) 10. Osteoarthritis of hip (715.95) (M16.9) 11. 
Primary osteoarthritis of hip (715.15) (M16.10) 12. Spondylolisthesis, lumbar 
region (738.4) (M43.16) Allergies  Morphine Derivatives Itching; Updated By: 
Geno Zapata; 2018 3:14:52 PMonly iv morphineDenied   Adhesive Tape 
Recorded By: Alesha Hollis; 2016 12:56:08 PM  Iodinated Contrast Media 
Recorded By: Alesha Hollis; 2016 12:56:08 PM  Latex Recorded By: Alesha Hollis; 2016 12:56:08 PM Current Meds  Aleve-D Sinus  Cold TB12;Therapy: 
(Recorded:2017) to Recordedld 10/9/17 am  Aspirin  MG Oral Tablet 
Delayed Release; take one pill po every day;Therapy: 15Yzz5947 to 
(Evaluate:2018) Recordedself prescribed  Lexapro 20 MG Oral Tablet;Therapy:
(Recorded:80Zyr4072) to Recorded  Magnesium Oxide 400 MG Oral Capsule;Therapy: 
30Gnp9476 to Recorded  Metrogel GEL;Therapy: (Recorded:47Ppf6245) to Recorded  
Minocycline HCl CAPS;Therapy: (Recorded:58Dcv1614) to Recorded  Morphine Sulfate
ER 15 MG Oral Tablet Extended Release; TAKE 1 TABLET EVERY 8HOURS MDD:3;Therapy:
82Ejy9553 to (Evaluate:2020)  Requested for: 2020; LastRx:2020 
OrderedLD-3/5/2020 11AM  oxyCODONE-Acetaminophen  MG Oral Tablet; Take 1 
tablets every 12 hours asneeded for pain MDD:2;Therapy: 60Kfs6374 to 
(Evaluate:2020)  Requested for: 2020; LastRx:96Auo5545 OrderedLD-
3/5/2020  11AM  Potassium Gluconate 595 (99 K) MG Oral Tablet;Therapy: 
(Recorded:2016) to Recorded  Protonix 40 MG Oral Tablet Delayed 
Release;Therapy: 2018 to Recorded  Wellbutrin TABS;Therapy: 
(Recorded:2018) to Recorded Past Medical History  History of Chronic 
headaches (784.0) (R51)  Denied: History of blood coagulation disorder  History 
of degenerative disc disease (V13.59) (Z87.39)  History of depression (V11.8) 
(Z86.59)  History of fibromyalgia (V13.59) (Z87.39)  Assessed By: Arabella Brown 
(Pain Management); Last Assessed: 2017  History of hepatitis A virus 
infection (V12.09) (Z86.19)  History of kidney stones (V13.01) (Z87.442)  
History of osteoarthritis (V13.4) (Z87.39)  Assessed By: Arabella Brown (Pain 
Management); Last Assessed: 2017  History of small bowel obstruction 
(V12.79) (Z87.19)    History of Not currently pregnant (V49.89) (Z78.9)  
Denied: History of On anticoagulant therapy Surgical History  History of 
Appendectomy  3/2013  History of Back Surgery  2015    C5 disk replacement  
History of Gastric Surgery For Morbid Obesity    History of Hernia Repair  
2013  History of Hip Replacement Left  History of Hysterectomy  3/2004  
Denied: History of Implantable Cardioverter-Defibrillator  History of Knee 
Arthroplasty  History of Knee Replacement  2005  History of Knee Surgery  
2015  Denied: History of Pacemaker Placement  History of Total Hip Replacement
  Family History  Family history of arthritis (V17.7) (Z82.61)  Family 
history of cardiac disorder (V17.49) (Z82.49)  Family history of cardiac 
disorder (V17.49) (Z82.49)  Family history of diabetes mellitus (V18.0) (Z83.3) 
Family history of malignant neoplasm (V16.9) (Z80.9) Social History  Current 
non-drinker of alcohol (V49.89) (Z78.9)  Denied: History of Drug use    
Never a smoker  Not currently employed VitalsVital Signs Recorded: 2020 
04:01PM Height: 5 ft 6 inWeight: 204 lb BMI Calculated: 32.93BSA Calculated: 2.0
2Systolic: 116, SittingDiastolic: 74, SittingHeart Rate: 82Respiration: 16Height
measured w/wo shoes: w/shoesPain Scale: 7 Physical ExamGeneral: The patient is a
well nourished/well developed, female, with a medium build, who is in no acute 
distress and appears stated age. Eyes: Lids are atraumatic, no lesions, sclerae 
are anicteric. Ears, Nose, Mouth, Throat: external ears and nose without trauma.
Gait and Station: Gait was normal. Lumbosacral Spine: - Inspection: normal 
appearance. No deformity, ecchymosis, erythema or swelling noted. Normal 
Lordosis.. - Palpation/Tenderness: Tenderness on palpation of the LEFT: 
paraspinal , sciatic notch and sacroiliac joint. - Palpation/Tenderness: 
Tenderness on palpation of the RIGHT: paraspinal , sciatic notch and sacroiliac 
joint. - ROM Flexion: was painful. - ROM Extension: was painful.Right Lower 
Extremity Motor:- Hip: 5/5 flexion, 5/5 extension- Knee: 5/5 flexion, 5/5 
extension- Foot: 5/5 Plantar , 5/5 DorsiFlexionSpecial Tests: flip test was 
negative, positive facet challenge  and positive faberLeft Lower Extremity 
Motor:- Hip: 5/5 flexion, 5/5 extension- Knee: 5/5 flexion, 5/5 extension- Foot:
5/5 Plantar , 5/5 DorsiFlexionSpecial Tests: flip test was negative, positive 
facet challenge  and positive faberNeurological:. Skin: Warm, dry, acyanotic. 
Psychological: Alert and oriented to person, place and time. Mood and affect are
pleasant and appropriate. Judgement intact. Insight normal without delusions or 
hallucinations.  Denies suicidal/homicidal ideation.   Assessment 1. Chronic low
back pain (724.2,338.29) (M54.5,G89.29) 2. Lumbar radiculopathy (724.4) (M54.16)
3. Lumbar spondylosis (721.3) (M47.816) 4. Spondylolisthesis, lumbar region 
(738.4) (M43.16) Plan 1. Renew: Morphine Sulfate ER 15 MG Oral Tablet Extended 
Release; TAKE 1 TABLET EVERY 8 HOURS MDD:3LD-3/5/2020 11AM 2. Renew: oxyCODONE-
Acetaminophen  MG Oral Tablet; Take 1 tablets every 12 hours as needed for
pain MDD:2LD-3/5/2020  11AM 3. 30 Day RX Management  Follow-up  Status: Complete
 Done: 2020). Schedule (FUP) 60 days from today: : 86Kch1908). Is an 
additional appointment needed....? : Yes). Schedule 30 day RX from TODAY's date 
(2 days +/- either way): : 68Dlq6545 In my opinion based on subjective and 
objective findings from today's visit the patient is minimally changed. 
Medication:.  DEL  Information:  was consulted by my designee and I have 
reviewed the information presented to me and find no aberrant compliance issues.
Patient has been informed.  Controlled Substance Prescribed: MORPHINE ER, 
PERCOCET. The patient is on the lowest possible dose of opioids. The patient 
denies adverse side effects and does not demonstrate any aberrant drug taking 
behaviors. The patient is able to perform ADL's  including the following 
activities for longer periods of time with less pain: activities of daily 
living, sleeping, walking, standing, sitting, grocery shopping . There are no 
changes in current medications at this visit. Patient instructed to continue 
current regimen.  The prescribed medications are medically necessary for pain 
management and rehabilitation. Treatment includes: PROCEDURE(S): the patient 
defers blocks/procedures at this time THERAPIES: Therapy Treatment Plan: 
Deferred: All Therapies: Deferred: per patient request. FOLLOW UP: The patient 
should have a follow up 30 Day RX. CONTINUE TREATMENT: Yusra will continue with 
the following:. Yusra is participating in a home exercise program and is 
encouraged to continue. - : The patient was counseled on the following:  
treatment plan and future treatment options.        Discussion/SummaryYusra is 
seen for her low back and leg pain. She is stable on current regime. She is 
aware of treatment options including the possibility of MILD procedure.. AT this
time she is caring for her grand kids . She will be seen in a month. She may try
CBD to see if this might help.   Signatures Electronically signed by : Arabella Brown NP; Mar  5 2020  5:18PM EST                       (Author)  
Electronically signed by : Sonja Jones MD; Mar  6 2020 12:20PM EST              
        









          Name      Value     Range     Interpretation Code Description Data Alla

rce(s) Supporting 

Document(s)

 

                                                                       









                    ID                  Date                Data Source

 

                    [id number]            2020 08:52:56 PM EST [place]

[facility], PCName: Kandis DoshisDOB: 1960Provider: Ruthann NavarroS: 2020 Chief Complaint 2 MA completing section: dburtch   
History of Present IllnessA Urine Drug Screen was ordered for Yusra Dietz and 
collected on site today 2020. Creatinine has been ordered as well for 
specimen validity, not for kidney function. Preliminary UDS results are not 
final and should not be used to determine patient care or plan of treatment. 
Initially a qualitative immunoassay screen will be done. Any positive findings 
or negative findings that are out of compliance will be further tested with a 
more comprehensive quantitative confirmation LCMS study. It is part of the 
normal prescribing protocol of controlled substances and is considered standard 
of care.   The patient is being seen today for refill of prescriptions for 
controlled substances.      The date of onset of symptoms is approximately 25 
years.   Current Meds 1. Aleve-D Sinus  Cold TB12; Therapy: (Recorded:67Pcg8928)
to Recorded 2. Aspirin  MG Oral Tablet Delayed Release; take one pill po 
every day; Therapy: 62Pzc1986 to (Evaluate:2018) Recorded 3. Lexapro 20 MG 
Oral Tablet; Therapy: (Recorded:64Xuc9154) to Recorded 4. Magnesium Oxide 400 MG
Oral Capsule; Therapy: 36Gla2457 to Recorded 5. Metrogel GEL; Therapy: 
(Recorded:66Ifa9489) to Recorded 6. Minocycline HCl CAPS; Therapy: 
(Recorded:33Zor0053) to Recorded 7. Morphine Sulfate ER 15 MG Oral Tablet 
Extended Release; TAKE 1 TABLET EVERY 8 HOURS MDD:3; Therapy: 55Fcj3430 to 
(Evaluate:19Kab3667)  Requested for: 2020; Last Rx:2020 Ordered 8. 
oxyCODONE-Acetaminophen  MG Oral Tablet; Take 1 tablets every 12 hours as 
needed for pain MDD:2; Therapy: 62Xdy3645 to (Evaluate:02Ndz2766)  Requested 
for: 2020; Last Rx:2020 Ordered 9. Potassium Gluconate 595 (99 K) MG 
Oral Tablet; Therapy: (Recorded:2016) to Recorded 10. Protonix 40 MG Oral 
Tablet Delayed Release;  Therapy: 2018 to Recorded 11. Wellbutrin TABS;  
Therapy: (Recorded:2018) to Recorded Allergies  Morphine Derivatives 
Itching; Updated By: Geno Zapata; 2018 3:14:52 PMonly iv 
morphineDenied   Adhesive Tape Recorded By: Alesha Hollis; 2016 12:56:08 
PM  Iodinated Contrast Media Recorded By: Alesha Hollis; 2016 12:56:08 PM 
Latex Recorded By: Alesha Hollis; 2016 12:56:08 PM NotesNYSW 30 Day RX 
Note: Chief complaint: Patient is here today for 30 day refill of their 
controlled substance prescription Patient is being treated with chronic opioid 
therapy for CHRONIC LOW BACK PAIN.  Patient denies side effects related to 
chronic opioid use and has been re-educated regarding the controlled substance 
agreement. In my opinion patient continues to receive primary benefit with 
chronic opioid therapy. MORPHINE, OXYCODONE . CONTROLLED SUBSTANCE INFORMATION: 
I advised the patient today/previously regarding treatment with the above 
controlled substance and/or narcotic. The patient was then informed of the risk
s, benefits, and alternatives of the narcotic. The risks discussed included but 
were not limited to physical and/or psychological dependence, tolerance of the 
medication, drowsiness, sleepiness, balance/coordination problems, confusion, 
allergic reaction or any other abnormal symptoms. The patient was advised and 
agreed to use the medication only as prescribed, and not to drive, or operate 
heavy equipment or machinery. The patient understood and consented to both 
undergo a narcotic/opiate regimen and agrees to comply with all of the [place] 
[facility] terms of a controlled substance treatment and agreement.  I 
am giving the patient a 30 day refill without changes. The patient consents to 
move forward with treatment. ...OPIOID ABUSE is a national epidemic that 
Physicians and other prescribers have the power to help prevent. In our opioid 
monitoring surveillance to help minimize misuse and diversion, moderate to high 
risk patients are required to have face to face 30 day refill of opioids. This 
will help minimize the potential for electronic false requests, deterring 
potential fraudulent behavior. [facility]  Information:  was consulted by my 
designee and I have reviewed the information presented to me and find no 
aberrant compliance issues. Patient has been informed.    Physical ExamGeneral: 
The patient is a well nourished/well developed, female, who is obese, who is in 
no acute distress and appears stated age. Eyes: Lids are atraumatic, no lesions,
sclerae are anicteric. Ears, Nose, Mouth, Throat: external ears and nose without
trauma. Gait and Station: Gait was normal. Respiratory: Normal chest expansion 
and respiratory effort. Skin: Warm, dry, acyanotic. Psychological: Alert and 
oriented to person, place and time. Mood and affect are pleasant and 
appropriate. Judgement intact. Insight normal without delusions or 
hallucinations.  Denies suicidal/homicidal ideation.   Assessment 1. Chronic low
back pain (724.2,338.29) (M54.5,G89.29) 2. Lumbar radiculopathy (724.4) (M54.16)
Plan 1. Renew: Morphine Sulfate ER 15 MG Oral Tablet Extended Release; TAKE 1 
TABLET EVERY 8 HOURS MDD:3LD 2020 2. Renew: oxyCODONE-Acetaminophen  
MG Oral Tablet; Take 1 tablets every 12 hours as needed for pain MDD:2LD 
2020 3. UDS-LAB Medicare / Commercial ([facility] Urine Drug SCreen); [Do Not 
Release]; Specimen Source:Urine; Status:In Progress - Specimen/Data Collected;  
Done: 87Yya5158 [facility] Treatment Plan (2): In my opinion based on subjective and 
objective findings from today's visit the patient is minimally changed. 
Medication:. There are no changes in current medications at this visit. Patient 
instructed to continue current regimen.  UDS: This is an established patient and
is on ongoing opioid therapy. A UDS is being obtained today, the patient has 
previously consented to UDS as part of the Controlled Substance and Treatment 
Agreement. The reason for today's UDS is: patient was selected at random and 
scored as moderate risk on the opioid risk assessment. Creatinine has been 
ordered as well for specimen validity, not for kidney function. Preliminary UDS 
results are not final and should not be used to determine patient care or plan 
of treatment. Initially a qualitative immunoassay screen will be done. Any 
positive findings or negative findings that are out of compliance will be 
further tested with a more comprehensive quantitative confirmation LCMS study. 
It is part of the normal prescribing protocol of controlled substances and is 
considered standard of care.  Treatment includes: FOLLOW UP: The patient should 
have a follow up visit in 1 month.        Signatures Electronically signed by : 
Dave Navarro NP; 2020  3:38PM EST                       (Author)  
Electronically signed by : Sourav Shepherd MD; 2020  8:52PM EST               
       









          Name      Value     Range     Interpretation Code Description Data Alla

rce(s) Supporting 

Document(s)

 

                                                                       









                    ID                  Date                Data Source

 

                    [id number]            2020 04:27:55 PM EST [place]

[facility], PCName: Kandis DoshisDOB: 1960Provider: Luolou Brown: 2020 Chief ComplaintPatient presents with back, neck and limb 
pain  Chief Complaint 2NYSW VAS PAIN Established:  MA completing section: 
AWellenzohn LPN   History of Present IllnessDo you have a Brace for your 
condition? Patient does not have a brace for their condition. Recent 
test/procedures: Patient was asked and denies having any tests since their last 
visit. Patient was asked and denies being seen by any Physicians since their 
last visit. At today's visit patient presents with their Self Patient is not 
currently working. 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 The patient is
being seen for a follow-up.       Pain Duration:    Pain Quality: 
(Neuropathic)  burning and numbness Pain Quality: (Nociceptive)  aching and 
sharp Timing:  constant Progression:  unchanged Palliation:  block, heat, opioid
analgesics and rest Exacerbating:  standing, walking and weather Pain Score:  a 
current pain level of 6/10 and a maximum pain level of 10/10. Condition type: 
The patient is being seen for a chronic condition. PAIN LOCATION:  the pain is 
located in the low back ,  (weakness , achy and throbbing pain) radiates to the 
right buttock, left buttock, right hip, left hip, right thigh, right knee, right
calf/shin, right ankle, right big toe and right pinky toe, the pain is greater 
on the right side more than the left and the pain is in the leg equally with the
back. The etiology of this injury/condition is unknown. RELATIONSHIP TO INJURY: 
This condition is not related to a specific injury. INJURY MECHANISM: The injury
resulted from  no known physical event. REVIEW OF PAST DIAGNOSTICS: have 
included:  MRI and electromyography/nerve conduction studies . Records were 
obtained, reviewed and on file. PAST TREATMENT has included:  NONSTEROIDAL ANTI-
INFLAMMATORY drugs (not effective) Includes Mobic., but ANTICONVULSANTS 
(effective) Includes Lyrica., OPIOID ANALGESICS (effective) Includes. 
hydrocodone does not work. fentanyl patch seems to be working well., cervical 
surgery 2015 (effective). - Radio Frequency Pertinent Information: On a 
RADIO FREQUENCY ABLATION of the BILATERAL, LUMBAR FACET JOINTS under fluoroscopy
as confirmed by previous successful medial branch nerve blocks.  80 % of pain 
relief was provided which is ongoing. Allows patient to increase activity and 
functionality including the following activities for longer periods of time with
less pain: activities of daily living, better sleep, walking, standing, sitting,
grocery shop . 19, 19 DR. RASHID., good reduction of pain in the low 
back and able to walk better. INTERVAL EVENTS: include . She is stable on 
current regime. The pain is well managed with the meds...she is caring for her 
grand kids so this is keeping her busy. She does not feel like she needs a block
at this time. ASSOCIATED SYMPTOMS: include difficulty sleeping , difficulty 
walking, radiating, extremity weakness:  left, lower extremity, right. and 
urinary incontinence, but no fecal incontinence. FUNCTIONAL LIMITATIONS: The 
patient's functional status is limited as follows: ability to  perform 
activities of daily living, participate in aerobic activity, participate in 
hobbies, perform housework and maintain normal sleep pattern. MEDICATION SIDE 
EFFECTS experienced by patient are:  drowsiness.   Review of SystemsROS was 
reviewed with patient; documented on established patient questionnaire dated 
1-6-20. I feel the ROS to be negative/normal other than  neck, back and joint 
pain.    Patient maintains at today's visit there has been no change in his/her 
hematologic history.     Active Problems 1. Chronic hip pain, left 
(719.45,338.29) (M25.552,G89.29) 2. Chronic low back pain (724.2,338.29) 
(M54.5,G89.29) 3. Facet arthropathy, lumbar (721.3) (M47.816) 4. History of 
depression (V11.8) (Z86.59) 5. History of fibromyalgia (V13.59) (Z87.39) 6. 
History of osteoarthritis (V13.4) (Z87.39) 7. Long term current use of opiate 
analgesic (V58.69) (Z79.891) 8. Lumbar radiculopathy (724.4) (M54.16) 9. Lumbar 
spondylosis (721.3) (M47.816) 10. Osteoarthritis of hip (715.95) (M16.9) 11. 
Primary osteoarthritis of hip (715.15) (M16.10) 12. Spondylolisthesis, lumbar re
gion (738.4) (M43.16) Allergies  Morphine Derivatives Itching; Updated By: 
Geno Zapata; 2018 3:14:52 PMonly iv morphineDenied   Adhesive Tape 
Recorded By: Alesha Hollis; 2016 12:56:08 PM  Iodinated Contrast Media 
Recorded By: Alesha Hollis; 2016 12:56:08 PM  Latex Recorded By: Alesha Hollis; 2016 12:56:08 PM Current Meds  Aleve-D Sinus  Cold TB12;Therapy: 
(Recorded:2017) to Recordedld 10/9/17 am  Aspirin  MG Oral Tablet 
Delayed Release; take one pill po every day;Therapy: 97Dot7532 to 
(Evaluate:2018) Recordedself prescribed  Lexapro 20 MG Oral Tablet;Therapy:
(Recorded:19Rkd8911) to Recorded  Magnesium Oxide 400 MG Oral Capsule;Therapy: 
29Moj7258 to Recorded  Metrogel GEL;Therapy: (Recorded:93Zai4964) to Recorded  
Minocycline HCl CAPS;Therapy: (Recorded:69Jrq2564) to Recorded  Morphine Sulfate
ER 15 MG Oral Tablet Extended Release; TAKE 1 TABLET EVERY 8HOURS MDD:3;Therapy:
06Vdb4865 to (Evaluate:2020)  Requested for: 89Iid5340; LastRx:10Bgx5373 
OrderedLD 11:30am 20  oxyCODONE-Acetaminophen  MG Oral Tablet; Take 1 
tablets every 12 hours asneeded for pain MDD:2;Therapy: [id number] to 
(Evaluate:2020)  Requested for: 26Iiv2523; LastRx:26Kht5023 OrderedLD 
11:30am 20  Potassium Gluconate 595 (99 K) MG Oral Tablet;Therapy: 
(Recorded:2016) to Recorded  Protonix 40 MG Oral Tablet Delayed 
Release;Therapy: 2018 to Recorded  Wellbutrin TABS;Therapy: 
(Recorded:2018) to Recorded Past Medical History  History of Chronic 
headaches (784.0) (R51)  Denied: History of blood coagulation disorder  History 
of degenerative disc disease (V13.59) (Z87.39)  History of depression (V11.8) 
(Z86.59)  History of fibromyalgia (V13.59) (Z87.39)  Assessed By: Arabella Brown 
(Pain Management); Last Assessed: 2017  History of hepatitis A virus 
infection (V12.09) (Z86.19)  History of kidney stones (V13.01) (Z87.442)  
History of osteoarthritis (V13.4) (Z87.39)  Assessed By: Arabella Brown (Pain 
Management); Last Assessed: 2017  History of small bowel obstruction 
(V12.79) (Z87.19)    History of Not currently pregnant (V49.89) (Z78.9)  
Denied: History of On anticoagulant therapy Surgical History  History of 
Appendectomy  3/2013  History of Back Surgery  2015    C5 disk replacement  
History of Gastric Surgery For Morbid Obesity  2006  History of Hernia Repair  
2013  History of Hip Replacement Left  History of Hysterectomy  3/2004  
Denied: History of Implantable Cardioverter-Defibrillator  History of Knee 
Arthroplasty  History of Knee Replacement  2005  History of Knee Surgery  
2015  Denied: History of Pacemaker Placement  History of Total Hip Replacement
  Family History  Family history of arthritis (V17.7) (Z82.61)  Family 
history of cardiac disorder (V17.49) (Z82.49)  Family history of cardiac 
disorder (V17.49) (Z82.49)  Family history of diabetes mellitus (V18.0) (Z83.3) 
Family history of malignant neoplasm (V16.9) (Z80.9) Social History  Current 
non-drinker of alcohol (V49.89) (Z78.9)  Denied: History of Drug use    
Never a smoker  Not currently employed VitalsVital Signs Recorded: 2020 
03:36PM Height: 5 ft 6.5 inWeight: 209 lb BMI Calculated: 33.23BSA Calculated: 
2.05Systolic: 120, SittingDiastolic: 80, SittingHeart Rate: 72Respiration: 
16Height measured w/wo shoes: w/o shoesPain Scale: 6 Physical ExamGeneral: The 
patient is a well nourished/well developed, female, who is in no acute distress 
and appears stated age. Eyes: Lids are atraumatic, no lesions, sclerae are 
anicteric. Ears, Nose, Mouth, Throat: external ears and nose without trauma. 
Gait and Station: Gait was normal. Psychological: Alert and oriented to person, 
place and time. Mood and affect are pleasant and appropriate. Judgement intact. 
Insight normal without delusions or hallucinations.  Denies suicidal/homicidal 
ideation.   Assessment 1. Chronic low back pain (724.2,338.29) (M54.5,G89.29) 2.
Facet arthropathy, lumbar (721.3) (M47.816) 3. Lumbar radiculopathy (724.4) 
(M54.16) 4. Chronic hip pain, left (719.45,338.29) (M25.552,G89.29) 5. Lumbar 
spondylosis (721.3) (M47.816) Plan 1. Renew: Morphine Sulfate ER 15 MG Oral 
Tablet Extended Release; TAKE 1 TABLET EVERY 8 HOURS MDD:3LD 11:30am 20 2. 
Renew: oxyCODONE-Acetaminophen  MG Oral Tablet; Take 1 tablets every 12 
hours as needed for pain MDD:2LD 11:30am 20 In my opinion based on 
subjective and objective findings from today's visit the patient is minimally 
changed. Medication:.  [place]  Information:  was consulted by my designee and
I have reviewed the information presented to me and find no aberrant compliance 
issues. Patient has been informed.  Controlled Substance Prescribed: MORPHINE 
ER, PERCOCET. The patient is on the lowest possible dose of opioids. The patient
denies adverse side effects and does not demonstrate any aberrant drug taking 
behaviors. The patient is able to perform ADL's  including the following 
activities for longer periods of time with less pain: activities of daily 
living, sleeping, walking, standing, sitting, grocery shopping . There are no 
changes in current medications at this visit. Patient instructed to continue 
current regimen.  The prescribed medications are medically necessary for pain 
management and rehabilitation. Treatment includes: PROCEDURE(S): the patient 
defers blocks/procedures at this time THERAPIES: Therapy Treatment Plan: 
Deferred: All Therapies: Deferred: per patient request. FOLLOW UP: The patient 
should have a follow up 30 Day RX. CONTINUE TREATMENT: Yusra will continue with 
the following:. Yusra is participating in a home exercise program and is 
encouraged to continue. - : The patient was counseled on the following:  
treatment plan and future treatment options.        Discussion/SummaryYusra is 
seen for her low back and neck pain. She is reporting that for the most part she
is ok but that the meds do help. She is not yet ready for a block. She also is 
potentially a MILD candidate at some point in time she may want to see Dr. Rashid 
in the office to discuss any other options available to her. She will be seen in
a month.   Signatures Electronically signed by : Arabella Brown NP; 2020  
4:27PM EST                       (Author)  Electronically signed by : Laura Castro MD; 2020  4:27PM EST                       









          Name      Value     Range     Interpretation Code Description Data Alla

rce(s) Supporting 

Document(s)

 

                                                                       









                    ID                  Date                Data Source

 

                    [id number]            2019 08:31:05 AM EST [place]

[facility], PCName: Kandis kay ChildsDOB: 1960Provider: 

Gabriela Navarro: 2019 Chief Complaint 2MA completing section: DAIANA Domínguez   History of Present IllnessThe patient is being seen today for refill of 
prescriptions for controlled substances.         Current Meds 1. Aleve-D Sinus  
Cold TB12; Therapy: (Recorded:2017) to Recorded 2. Aspirin  MG Oral 
Tablet Delayed Release; take one pill po every day; Therapy: 47Tsy6369 to 
(Evaluate:2018) Recorded 3. Lexapro 20 MG Oral Tablet; Therapy: 
(Recorded:96Yzl8739) to Recorded 4. Magnesium Oxide 400 MG Oral Capsule; 
Therapy: 48Eux5109 to Recorded 5. Metrogel GEL; Therapy: (Recorded:76Zpx1935) to
Recorded 6. Minocycline HCl CAPS; Therapy: (Recorded:89Ini9801) to Recorded 7. 
Morphine Sulfate ER 15 MG Oral Tablet Extended Release; TAKE 1 TABLET EVERY 8 
HOURS MDD:3; Therapy: 93Beg1137 to (Evaluate:13Kzu4840)  Requested for: 
2019; Last Rx:2019 Ordered 8. oxyCODONE-Acetaminophen  MG Oral 
Tablet; Take 1 tablets every 12 hours as needed for pain MDD:2; Therapy: 
17Aug5157 to (Evaluate:93Pbd4651)  Requested for: 2019; Last Rx:2019 
Ordered 9. Potassium Gluconate 595 (99 K) MG Oral Tablet; Therapy: 
(Recorded:2016) to Recorded 10. Protonix 40 MG Oral Tablet Delayed Release;
 Therapy: 2018 to Recorded 11. Wellbutrin TABS;  Therapy: 
(Recorded:2018) to Recorded Allergies  Morphine Derivatives Itching; 
Updated By: Geno Zapata; 2018 3:14:52 PMonly iv morphineDenied   
Adhesive Tape Recorded By: Alesha Hollis; 2016 12:56:08 PM  Iodinated 
Contrast Media Recorded By: Alesha Hollis; 2016 12:56:08 PM  Latex 
Recorded By: Alesha Hollis; 2016 12:56:08 PM NotesNYSW 30 Day RX Note: 
Chief complaint: Patient is here today for 30 day refill of their controlled 
substance prescription Patient is being treated with chronic opioid therapy for 
CHRONIC LOW BACK PAIN.  Patient denies side effects related to chronic opioid 
use and has been re-educated regarding the controlled substance agreement. In my
opinion patient continues to receive primary benefit with chronic opioid 
therapy. MORPHINE, OXYCODONE . CONTROLLED SUBSTANCE INFORMATION: I advised the 
patient today/previously regarding treatment with the above controlled substance
and/or narcotic. The patient was then informed of the risks, benefits, and 
alternatives of the narcotic. The risks discussed included but were not limited 
to physical and/or psychological dependence, tolerance of the medication, 
drowsiness, sleepiness, balance/coordination problems, confusion, allergic 
reaction or any other abnormal symptoms. The patient was advised and agreed to 
use the medication only as prescribed, and not to drive, or operate heavy 
equipment or machinery. The patient understood and consented to both undergo a 
narcotic/opiate regimen and agrees to comply with all of the [facility] terms of a controlled substance treatment and agreement.  I am giving 
the patient a 30 day refill without changes. The patient consents to move 
forward with treatment. ...OPIOID ABUSE is a national epidemic that Physicians 
and other prescribers have the power to help prevent. In our opioid monitoring 
surveillance to help minimize misuse and diversion, moderate to high risk 
patients are required to have face to face 30 day refill of opioids. This will 
help minimize the potential for electronic false requests, deterring potential 
fraudulent behavior. [facility]  Information:  was consulted by my designee and I
have reviewed the information presented to me and find no aberrant compliance 
issues. Patient has been informed.    Physical ExamGeneral: The patient is a 
well nourished/well developed, female, who is obese, who is in no acute distress
and appears stated age. Eyes: Lids are atraumatic, no lesions, sclerae are 
anicteric. Ears, Nose, Mouth, Throat: external ears and nose without trauma. 
Gait and Station: Gait was normal. Respiratory: Normal chest expansion and 
respiratory effort. Skin: Warm, dry, acyanotic. Psychological: Alert and 
oriented to person, place and time. Mood and affect are pleasant and 
appropriate. Judgement intact. Insight normal without delusions or 
hallucinations.  Denies suicidal/homicidal ideation.   Assessment 1. Chronic low
back pain (724.2,338.29) (M54.5,G89.29) 2. Long term current use of opiate 
analgesic (V58.69) (Z79.891) Plan 1. Renew: Morphine Sulfate ER 15 MG Oral 
Tablet Extended Release; TAKE 1 TABLET EVERY 8 HOURS MDD:3LD-19-2:00PM 2. 
Renew: oxyCODONE-Acetaminophen  MG Oral Tablet; Take 1 tablets every 12 
hours as needed for pain MDD:2LD-19-2:00PM [facility] Treatment Plan (2): Treatm
ent includes: FOLLOW UP: The patient should have a follow up visit in 1 month.  
     Signatures Electronically signed by : FE Lr; Dec  4 2019  
7:08PM EST                       (Author)  Electronically signed by : Cristian Stovall MD; Dec 11 2019  8:31AM EST                       









          Name      Value     Range     Interpretation Code Description Data Alla

rce(s) Supporting 

Document(s)

 

                                                                       







                                        Procedure

 

                                          



                                                                                
                                                                                
                                                                                
                                                                           



Social History

          



           Code       Duration   Value      Status     Description Data Source(s

)

 

             Smoking      2020 12:00:00 AM EDT Patient has never smoked co

mpleted    Patient 

has never smoked                        [facility]



                                                                                
                 



Vital Signs

          



                    ID                  Date                Data Source

 

                    UNK                                      









           Name       Value      Range      Interpretation Code Description Data

 Source(s)

 

           Body mass index (BMI) [Ratio] 33.9 kg/m2                       33.9 k

g/m2 [facility]

 

           Ideal body weight 130 [lb_av]                       130 [lb_av] [facility]

[facility]

 

           Oxygen saturation in Arterial blood by Pulse oximetry 99 %           

                  99 %       [facility]

 

           Body weight 215.00 [lb_av]                       215.00 [lb_av] [facility]

[facility]

 

           Body height 66.75 [in_i]                       66.75 [in_i] [facility]

 

                                        5'6.75" 

 

           Respiratory rate 18 /min                          18 /min    [facility]

[facility]

 

           Body temperature 97.3 [degF]                       97.3 [degF] [facility]

 [facility]

 

           Heart rate 85 /min                          85 /min    [facility]

 

           Diastolic blood pressure 73 mm[Hg]                        73 mm[Hg]  

[facility]

 

           Systolic blood pressure 108 mm[Hg]                       108 mm[Hg] M

[facility]

 

           Body mass index (BMI) [Ratio] 33.0 kg/m2                       33.0 k

g/m2 [facility]

 

           Ideal body weight 130 [lb_av]                       130 [lb_av] [facility]

[facility]

 

           Oxygen saturation in Arterial blood by Pulse oximetry 98 %           

                  98 %       [facility]

 

           Body weight 209.38 [lb_av]                       209.38 [lb_av] [facility]

[facility]

 

           Body height 66.75 [in_i]                       66.75 [in_i] [facility]

 

                                        5'6.75" 

 

           Respiratory rate 16 /min                          16 /min    [facility]

[facility]

 

           Body temperature 97.5 [degF]                       97.5 [degF] [facility]

 [facility]

 

           Heart rate 82 /min                          82 /min    [facility]

 

           Diastolic blood pressure 60 mm[Hg]                        60 mm[Hg]  

[facility]

 

           Systolic blood pressure 127 mm[Hg]                       127 mm[Hg] M

[facility]

 

           Body mass index (BMI) [Ratio] 33.3 kg/m2                       33.3 k

g/m2 [facility]

 

           Oxygen saturation in Arterial blood by Pulse oximetry 98 %           

                  98 %       [facility]

 

           Body weight 211.25 [lb_av]                       211.25 [lb_av] [facility]

[facility]

 

           Body height 66.75 [in_i]                       66.75 [in_i] [facility]

 

                                        5'6.75" 

 

           Respiratory rate 16 /min                          16 /min    [facility]

[facility]

 

           Body temperature 98.0 [degF]                       98.0 [degF] [facility]

 [facility]

 

           Heart rate 77 /min                          77 /min    [facility]

 

           Diastolic blood pressure 65 mm[Hg]                        65 mm[Hg]  

[facility]

 

           Systolic blood pressure 126 mm[Hg]                       126 mm[Hg] M

[facility]

## 2021-01-20 NOTE — CCD
Ambulatory Summary

                             Created on: 2021



Anitha Dietz

External Reference #: [id number]

: 1960

Sex: Female



Demographics





                          Address                   [address] [place] [address] 

[place]  [address]

 

                          Home Phone                [phone]

 

                          Preferred Language        English

 

                          Marital Status            Unknown

 

                          Mormon Affiliation     Unknown

 

                          Race                      White

 

                          Ethnic Group              Not  or 





Author





                          Organization              Unknown

 

                          Address                   [address] [place]  [address]



 

                          Phone                     [phone]







Care Team Providers





                    Care Team Member Name Role                Phone

 

                    Osmani Bundy        Unavailable         Unavailable



                                                      



Allergies

                      



              None recorded.                                                    
                                               



Medications

                      



                Name                   Status                   Start Date      

             

Stop Date                                                            

 

                                        cyclobenzaprine 10 mg tablet

 TAKE ONE TABLET BY MOUTH THREE TIMES A DAY AS NEEDED FOR SPASMS MAXIMUM DAILY 
DOSE   3            Active                                 Not available

 

                ergocalciferol (vitamin D2) 1,250 mcg (50,000 unit) capsule Acti

ve                         Not 

available

 

                escitalopram 20 mg tablet Active                         Not av

ailable

 

                minocycline 100 mg capsule Active                         Not a

vailable

 

                minocycline 50 mg capsule Active                         Not av

ailable

 

                                        morphine 15 mg immediate release tablet

 TAKE ONE TABLET BY MOUTH EVERY 8 HOURS MAXIMUM DAILY DOSE   THREE TABLETS 

Active                                             Not available

 

                                        morphine ER 15 mg tablet,extended releas

e

 TAKE ONE TABLET BY MOUTH EVERY 8 HOURS MAXIMUM DAILY DOSE   3 Active           

                      Not 

available

 

                Narcan 4 mg/actuation nasal spray Active                       

  Not available

 

                oxycodone-acetaminophen 10 mg-325 mg tablet Active             

            Not available

 

                pantoprazole 40 mg tablet,delayed release Active               

          Not available

 

                Wellbutrin  mg tablet, 12 hr sustained-release Active     

                    Not available



                                                                                
                                                                                
                          



Problems

          



None recorded.                                                                  
           



Procedures

          



None recorded.                                                                  
           



Results

                          



                                        Lab Results

 

                                         



                



      Date  Name  Specimen Result Interpretation Description Value Range Status 

Address 



 

                2021      SARS CoV 2 RdRp Gene, QL Probe, Respiratory Spec

imen Nasopharyngeal  

Normal                 Sars-cov-2  negative   negative   Final      [facility] [place] 

Medical: [address] [facility]



                                                                              



Past Encounters

                      



                                        2021

Exposure to SARS-CoV-2

Osmani Bundy MD: [address] [place] [id number], Ph. [phone]



                                                                                
       



Social History

          



None recorded.                                                                  
           



Vaccine List

          



None recorded.                                                                  
           



Plan of Care

                      



                Reminders                                                       

    Provider    

           

 

                Appointments    None recorded.                 

 

                Lab             None recorded.                 

 

                Referral        None recorded.                 

 

                Procedures      None recorded.                 

 

                Surgeries       None recorded.                 

 

                Imaging         None recorded.                 



                                                                              



Vitals

          



None recorded.

## 2021-01-20 NOTE — CCD
Called patient's mother, Becca.  She will come  script for her son's Concerta.     Script left at [facility]  desk.   Continuity of Care Document (CCD)

                             Created on: 2021



Anitha Dietz

External Reference #: [id number]

: 1960

Sex: Female



Demographics





                          Address                   [address] 

[place]  [address]

 

                          Home Phone                [phone]

 

                          Preferred Language        Unknown

 

                          Marital Status            Unknown

 

                          Nondenominational Affiliation     [employer]

 

                          Race                      White

 

                          Ethnic Group              Not  or 





Author





                          Author                    Anitha HIGH M.D.

 

                          Organization              Unknown

 

                          Address                   [address]

[place]  [address]



 

                          Phone                     [phone]







Care Team Providers





                    Care Team Member Name Role                Phone

 

                    [facility]          

      [phone]







Problems





                    Active Problems     Provider            Date

 

                    Mixed hyperlipidemia Teri High M.D. Onset: 

011

 

                    Depressive disorder Teri High M.D. Onset: 20

11

 

                    Menopausal and postmenopausal disorders Teri High M.D. Onset: 

2011

 

                    Bariatric Surgery Status Teri High M.D. Onset: 







Social History





                Type            Date            Description     Comments

 

                Birth Sex                       Unknown          

 

                Tobacco Use     Start: Unknown  Never Smoked Cigarettes  

 

                Tobacco Use     Start: Unknown  Never Used Smokeless Tobacco  

 

                ETOH Use                        Rarely consumes alcohol  

 

                Tobacco Use     Start: Unknown  Patient has never smoked  

 

                Recreational Drug Use                 Denies Drug Use  

 

                Smoking Status  Reviewed: 20 Patient has never smoked  

 

                Exercise Type/Frequency                 Exercises regularly Walk

s the dog twice a day 

 

                Tattoo/Piercing                 Pierced ears     

 

                Sun Exposure                    Minimum amount of sun exposure  

 

                Sun Exposure                    Uses sunscreen   

 

                Seat Belt/Car Seat                 Always uses seat belt  

 

                Bike Helmet                     Never           Unable to bike r

rikki 

 

                Smoke Alarms                    Yes              

 

                Smoke Alarms                    Carbon Monoxide Detector: Yes  







Allergies, Adverse Reactions, Alerts





             Active Allergies Reaction     Severity     Comments     Date

 

             Morphine     itching                                09/15/2011

 

                                        Inactive Allergies

 

             NKDA                                                2004







Medications





           Active Medications SIG        Qnty       Indications Ordering Provide

r Date

 

                                        Vitamin D (Ergocalciferol)              

       1.25mg (73988 Ut) Capsules       

                take one capsule by mouth once weekly 12caps                    

      Teri High M.D.                                    2021

 

                                        Bupropion Hydrochloride ER (SR)         

            150mg Tablets ER 12HR       

             1 by mouth twice a day 180tabs                   Teri High M.D. 06/10/2019

 

                          Escitalopram Oxalate                     20mg Tablets 

                  1 by 

mouth every day 90tabs          F32.1           Teri High M.D. 

018

 

                          Oxycodone HCL                     10mg Tablets        

           1 tab by mouth 

bid                                             Unknown         

 

                          Pantoprazole Sodium                     40mg Tablets D

R                   take 

one tablet by mouth every day for heartburn/ acid reflux 90tabs                 

                 Monica Mckinney FNP                              

 

                          Morphine Sulfate                     15mg Tablets     

              one tab by 

mouth tid                                       Unknown         

 

                    Potassium & Magnesium                      Capsules         

          one po qd           

                                        Unknown             

 

                          Vitamin D High Potency                     5000Iu Caps

ules                   1 

by mouth once a week every Monday                                 Unknown       

  

 

                          Aspir-81 Ec                     81mg Tablets DR       

            1 by mouth 

every day                                       Unknown         

 

                          Minocycline HCL                     50mg Capsules     

              1 by mouth 

every day                                       Unknown         







Immunizations





             CPT Code     Status       Date         Vaccine      Lot #

 

                [id number]           Given           10/16/2020      Influenza Virus 

Vaccine, Quadrivalent,age 3 and 

up,multidose vial                        

 

                [id number]           Given           2019      Influenza Virus 

Vaccine, Quadrivalent,age 3 and 

up,multidose vial                       [id number]

 

                [id number]           Given           2018      Influenza Virus 

Vaccine, Quadrivalent,age 3 and 

up,multidose vial                       [id number]

 

             [id number]        Given        01/15/2011   Adacel 11 Yrs or older  







Vital Signs





                Date            Vital           Result          Comment

 

                2021  5:01pm BP Systolic     108 mmHg         

 

                    BP Diastolic        73 mmHg              

 

                    Heart Rate          85 /min              

 

                    Body Temperature    97.3 F             

 

                    Respiratory Rate    18 /min              

 

                    Height              66.75 inches        5'6.75"

 

                    Weight              215.00 lb            

 

                    O2 % BldC Oximetry  99 %                 

 

                    Peak Expiratory Flow Rate 355                 Estimated Peak

 Flow Rate

 

                    Ideal Body Weight   130 lb               

 

                    BMI (Body Mass Index) 33.9 kg/m2           

 

                2020 12:11pm BP Systolic     127 mmHg         

 

                    BP Diastolic        60 mmHg              

 

                    Heart Rate          82 /min              

 

                    Body Temperature    97.5 F             

 

                    Respiratory Rate    16 /min              

 

                    Height              66.75 inches        5'6.75"

 

                    Weight              209.38 lb            

 

                    O2 % BldC Oximetry  98 %                 

 

                    Peak Expiratory Flow Rate 357                 Estimated Peak

 Flow Rate

 

                    Ideal Body Weight   130 lb               

 

                    BMI (Body Mass Index) 33.0 kg/m2           







Results





                                        Description

 

                                        No Information Available







Procedures





                Date            Code            Description     Status

 

                2020      [id number]        Mammogram       Completed

 

                2020      [id number]       Bone Mineral Density Test Comple

lulu

 

                2018      [id number]        Colonoscopy     Completed

 

                2014      [id number]        Mammogram       Completed







Medical Devices





                                        Description

 

                                        No Information Available







Encounters





           Type       Date       Location   Provider   Dx         Diagnosis

 

           Office Visit 2021  9:15a Main Office Raya Avelar PA R06.00     

Dyspnea, unspecified

 

           Office Visit 2021  4:45p Main Office Teri High M.D. F

32.1      Major 

depressive disorder, single episode, moderate

 

                          E78.2                     Mixed hyperlipidemia

 

                          M79.7                     Fibromyalgia

 

                          Z00.00                    Encntr for general adult med

ical exam w/o abnormal findings

 

                          R73.01                    Impaired fasting glucose

 

                          N94.11                    Superficial (introital) dysp

areunia







Assessments





                Date            Code            Description     Provider

 

                2021      R06.00          Dyspnea         Kala Avelar cia, PA

 

                2021      F32.1           Major depressive disorder, singl

e episode, moderate Teri High M.D.

 

                2021      E78.2           Mixed hyperlipidemia YAJAIRA High M.D.

 

                2021      M79.7           Fibromyalgia    Teri High M.D.

 

                    2021          Z00.00              Encounter for genera

l adult medical examination without 

abnormal findings                       Teri High M.D.

 

                2021      R73.01          Impaired fasting glucose Teri Brewster M.D.

 

                2021      N94.11          Superficial (introital) dyspareu

aundrea Teri High M.D.







Plan of Treatment

Future Appointment(s):* 2021  4:45 pm - Teri High M.D. at Main 
  Office

2021 - Raya Avelar PA* R06.00 Dyspnea* Comments:* Breathing 
  did not appear labored during visitShe was encouraged to get COVID testing. 
  Local testing centers reviewed.encouraged followup via telemedicine or ER if 
  symptoms worsen.









Functional Status





                Functional Condition Comment         Date            Status

 

                Trifocal glasses                                 Active

 

                Independent with all ADL's                                 Activ

e







Mental Status





                Mental Condition Comment         Date            Status

 

                None                                            Active







Referrals





                                        Description

 

                                        No Information Available

## 2021-01-21 VITALS — SYSTOLIC BLOOD PRESSURE: 138 MMHG | DIASTOLIC BLOOD PRESSURE: 96 MMHG

## 2021-01-21 VITALS — SYSTOLIC BLOOD PRESSURE: 126 MMHG | DIASTOLIC BLOOD PRESSURE: 82 MMHG

## 2021-01-21 LAB
ALBUMIN SERPL BCG-MCNC: 3.8 GM/DL (ref 3.2–5.2)
ALT SERPL W P-5'-P-CCNC: 31 U/L (ref 12–78)
BILIRUB SERPL-MCNC: 1.3 MG/DL (ref 0.2–1)
BUN SERPL-MCNC: 18 MG/DL (ref 7–18)
CALCIUM SERPL-MCNC: 9.7 MG/DL (ref 8.8–10.2)
CHLORIDE SERPL-SCNC: 104 MEQ/L (ref 98–107)
CO2 SERPL-SCNC: 30 MEQ/L (ref 21–32)
CREAT SERPL-MCNC: 0.93 MG/DL (ref 0.55–1.3)
EST. AVERAGE GLUCOSE BLD GHB EST-MCNC: 120 MG/DL (ref 60–110)
GFR SERPL CREATININE-BSD FRML MDRD: > 60 ML/MIN/{1.73_M2} (ref 45–?)
GLUCOSE SERPL-MCNC: 112 MG/DL (ref 70–100)
HCT VFR BLD AUTO: 42.2 % (ref 36–47)
HGB BLD-MCNC: 13.4 G/DL (ref 12–15.5)
MAGNESIUM SERPL-MCNC: 2 MG/DL (ref 1.8–2.4)
MCH RBC QN AUTO: 29.5 PG (ref 27–33)
MCHC RBC AUTO-ENTMCNC: 31.8 G/DL (ref 32–36.5)
MCV RBC AUTO: 93 FL (ref 80–96)
PLATELET # BLD AUTO: 284 10^3/UL (ref 150–450)
POTASSIUM SERPL-SCNC: 4 MEQ/L (ref 3.5–5.1)
PROT SERPL-MCNC: 6.6 GM/DL (ref 6.4–8.2)
RBC # BLD AUTO: 4.54 10^6/UL (ref 4–5.4)
SODIUM SERPL-SCNC: 141 MEQ/L (ref 136–145)
WBC # BLD AUTO: 4.7 10^3/UL (ref 4–10)

## 2021-01-21 RX ADMIN — BUPROPION HYDROCHLORIDE SCH MG: 150 TABLET, FILM COATED, EXTENDED RELEASE ORAL at 21:17

## 2021-01-21 RX ADMIN — PANTOPRAZOLE SODIUM SCH MG: 40 TABLET, DELAYED RELEASE ORAL at 21:18

## 2021-01-21 RX ADMIN — ESCITALOPRAM OXALATE SCH MG: 10 TABLET, FILM COATED ORAL at 21:18

## 2021-01-21 RX ADMIN — FUROSEMIDE SCH MG: 10 INJECTION, SOLUTION INTRAMUSCULAR; INTRAVENOUS at 07:57

## 2021-01-21 RX ADMIN — FUROSEMIDE SCH MG: 10 INJECTION, SOLUTION INTRAMUSCULAR; INTRAVENOUS at 11:48

## 2021-01-21 RX ADMIN — MAGNESIUM OXIDE SCH MG: 400 TABLET ORAL at 00:55

## 2021-01-21 RX ADMIN — FUROSEMIDE SCH MG: 10 INJECTION, SOLUTION INTRAMUSCULAR; INTRAVENOUS at 04:12

## 2021-01-21 RX ADMIN — ESCITALOPRAM OXALATE SCH MG: 10 TABLET, FILM COATED ORAL at 00:55

## 2021-01-21 RX ADMIN — BUPROPION HYDROCHLORIDE SCH MG: 150 TABLET, FILM COATED, EXTENDED RELEASE ORAL at 09:43

## 2021-01-21 RX ADMIN — ASPIRIN SCH MG: 325 TABLET, COATED ORAL at 09:43

## 2021-01-21 RX ADMIN — BUPROPION HYDROCHLORIDE SCH MG: 150 TABLET, FILM COATED, EXTENDED RELEASE ORAL at 03:11

## 2021-01-21 RX ADMIN — FUROSEMIDE SCH MG: 10 INJECTION, SOLUTION INTRAMUSCULAR; INTRAVENOUS at 00:56

## 2021-01-21 RX ADMIN — MAGNESIUM OXIDE SCH MG: 400 TABLET ORAL at 21:18

## 2021-01-21 RX ADMIN — PANTOPRAZOLE SODIUM SCH MG: 40 TABLET, DELAYED RELEASE ORAL at 00:55

## 2021-01-21 RX ADMIN — FUROSEMIDE SCH MG: 10 INJECTION, SOLUTION INTRAMUSCULAR; INTRAVENOUS at 16:00

## 2021-01-21 NOTE — IPNPDOC
Text Note


Date of Service


The patient was seen on 1/21/21.





NOTE


Subjective:


Patient seen and examined at bedside. No acute overnight events reported. 

Patient voices no new medical complaints.





Objective:


VITAL SIGNS: see below


GENERAL: NAD, lying comfortably in bed


HEENT: NC/AT


LUNGS: b/l crackles


HEART: irregularly irregular, tachycardic, +S1S2


ABDOMEN: soft, NT, ND, +BS


EXTREMITIES: no edema





A/P:


60 year old female with history of obesity, osteoarthritis, depression presented

to ED with one week worsening dyspnea on exertion, fatigue, chest pressure found

to be in new onset atrial fibrillation with RVR and elevated BNP concerning for 

new onset congestive heart failure. 





# New-onset Atrial fibrillation with RVR


- discussed with cardiology - loaded with digoxin


- continue with maintenance dose


- echo pending


- CHADSVASC 1 - holding of on a/c for now


- telemetry monitoring


-TSH WNL





#New onset Congestive heart failure, unspecified:


-Echocardiogram pending


-S/p 1 dose lasix 40mg IV in ED


-Continue lasix 40mg Q4H - hold for net negative 2L / day


-Strict I/O


-Daily weights


-Fluid restriction 2,000cc/day





#Osteoarthritis:


-Continue home pain management





#Mood disorder:


-Continue Wellbutrin, lexapro





#GERD:


-Continue Protonix





DVT ppx: TEDs/SCDs





DISPO: pending echo, clinical improvement





VS,Fishbone, I+O


VS, Fishbone, I+O


Laboratory Tests


1/20/21 18:16








1/21/21 06:44











Vital Signs








  Date Time  Temp Pulse Resp B/P (MAP) Pulse Ox O2 Delivery O2 Flow Rate FiO2


 


1/21/21 13:32  105  123/73 (90) 98 Room Air  


 


1/21/21 11:42   17     


 


1/21/21 10:46 98.5       














I&O- Last 24 Hours up to 6 AM 


 


 1/21/21





 06:00


 


Output Total 3600 ml


 


Balance -3600 ml

















ZEYAD DAVIS MD              Jan 21, 2021 17:05

## 2021-01-22 VITALS — SYSTOLIC BLOOD PRESSURE: 131 MMHG | DIASTOLIC BLOOD PRESSURE: 77 MMHG

## 2021-01-22 VITALS — SYSTOLIC BLOOD PRESSURE: 131 MMHG | DIASTOLIC BLOOD PRESSURE: 76 MMHG

## 2021-01-22 VITALS — DIASTOLIC BLOOD PRESSURE: 76 MMHG | SYSTOLIC BLOOD PRESSURE: 131 MMHG

## 2021-01-22 VITALS — SYSTOLIC BLOOD PRESSURE: 129 MMHG | DIASTOLIC BLOOD PRESSURE: 87 MMHG

## 2021-01-22 VITALS — DIASTOLIC BLOOD PRESSURE: 84 MMHG | SYSTOLIC BLOOD PRESSURE: 130 MMHG

## 2021-01-22 VITALS — DIASTOLIC BLOOD PRESSURE: 58 MMHG | SYSTOLIC BLOOD PRESSURE: 106 MMHG

## 2021-01-22 LAB
ALBUMIN SERPL BCG-MCNC: 3.6 GM/DL (ref 3.2–5.2)
ALT SERPL W P-5'-P-CCNC: 33 U/L (ref 12–78)
BILIRUB SERPL-MCNC: 0.8 MG/DL (ref 0.2–1)
BUN SERPL-MCNC: 24 MG/DL (ref 7–18)
CALCIUM SERPL-MCNC: 10.4 MG/DL (ref 8.8–10.2)
CHLORIDE SERPL-SCNC: 106 MEQ/L (ref 98–107)
CO2 SERPL-SCNC: 31 MEQ/L (ref 21–32)
CREAT SERPL-MCNC: 0.94 MG/DL (ref 0.55–1.3)
GFR SERPL CREATININE-BSD FRML MDRD: > 60 ML/MIN/{1.73_M2} (ref 45–?)
GLUCOSE SERPL-MCNC: 100 MG/DL (ref 70–100)
HCT VFR BLD AUTO: 42.7 % (ref 36–47)
HGB BLD-MCNC: 13.6 G/DL (ref 12–15.5)
MCH RBC QN AUTO: 29.4 PG (ref 27–33)
MCHC RBC AUTO-ENTMCNC: 31.9 G/DL (ref 32–36.5)
MCV RBC AUTO: 92.4 FL (ref 80–96)
PLATELET # BLD AUTO: 264 10^3/UL (ref 150–450)
POTASSIUM SERPL-SCNC: 3.5 MEQ/L (ref 3.5–5.1)
PROT SERPL-MCNC: 6.4 GM/DL (ref 6.4–8.2)
RBC # BLD AUTO: 4.62 10^6/UL (ref 4–5.4)
SODIUM SERPL-SCNC: 143 MEQ/L (ref 136–145)
WBC # BLD AUTO: 6.6 10^3/UL (ref 4–10)

## 2021-01-22 RX ADMIN — FUROSEMIDE SCH MG: 10 INJECTION, SOLUTION INTRAMUSCULAR; INTRAVENOUS at 09:06

## 2021-01-22 RX ADMIN — MAGNESIUM OXIDE SCH MG: 400 TABLET ORAL at 20:18

## 2021-01-22 RX ADMIN — FUROSEMIDE SCH MG: 10 INJECTION, SOLUTION INTRAMUSCULAR; INTRAVENOUS at 16:13

## 2021-01-22 RX ADMIN — ASPIRIN SCH MG: 325 TABLET, COATED ORAL at 09:04

## 2021-01-22 RX ADMIN — PANTOPRAZOLE SODIUM SCH MG: 40 TABLET, DELAYED RELEASE ORAL at 20:18

## 2021-01-22 RX ADMIN — DIGOXIN SCH MG: 250 TABLET ORAL at 09:05

## 2021-01-22 RX ADMIN — BUPROPION HYDROCHLORIDE SCH MG: 150 TABLET, FILM COATED, EXTENDED RELEASE ORAL at 09:04

## 2021-01-22 RX ADMIN — FUROSEMIDE SCH MG: 10 INJECTION, SOLUTION INTRAMUSCULAR; INTRAVENOUS at 00:13

## 2021-01-22 RX ADMIN — BUPROPION HYDROCHLORIDE SCH MG: 150 TABLET, FILM COATED, EXTENDED RELEASE ORAL at 20:18

## 2021-01-22 RX ADMIN — ESCITALOPRAM OXALATE SCH MG: 10 TABLET, FILM COATED ORAL at 20:18

## 2021-01-22 RX ADMIN — FUROSEMIDE SCH MG: 10 INJECTION, SOLUTION INTRAMUSCULAR; INTRAVENOUS at 00:27

## 2021-01-22 NOTE — IPNPDOC
Text Note


Date of Service


The patient was seen on 1/22/21.





NOTE


Subjective:


Patient seen and examined at bedside. Still tachycardic, received IV digoxin 

overnight. This morning she voices no medical complaints. She became quite tachy

during exam, similar to the previous day. Remained asymptomatic.





Objective:


VITAL SIGNS: see below


GENERAL: NAD, lying comfortably in bed


HEENT: NC/AT


LUNGS: b/l crackles


HEART: irregularly irregular, tachycardic, +S1S2


ABDOMEN: soft, NT, ND, +BS


EXTREMITIES: no edema





A/P:


60 year old female with history of obesity, osteoarthritis, depression presented

to ED with one week worsening dyspnea on exertion, fatigue, chest pressure found

to be in new onset atrial fibrillation with RVR and elevated BNP concerning for 

new onset congestive heart failure. 





# New-onset Atrial fibrillation with RVR


- discussed with cardiology - loaded with digoxin


- continue with maintenance dose - 0.25 mg daily


- echo pending


- CHADSVASC 1 - holding of on a/c for now


- telemetry monitoring


-TSH WNL





#New onset Congestive heart failure, unspecified:


-Echocardiogram pending


-S/p 1 dose lasix 40mg IV in ED


-Continue lasix 40mg Q8H - hold for net negative 2L / day


-Strict I/O


-Daily weights


-Fluid restriction 2,000cc/day





#Osteoarthritis:


-Continue home pain management





#Mood disorder:


-Continue Wellbutrin, lexapro





#GERD:


-Continue Protonix





DVT ppx: TEDs/SCDs





DISPO: pending echo, improvement in ventricular rate, further diuresis, PT





VS,Fishbone, I+O


VS, Fishbone, I+O


Laboratory Tests


1/22/21 04:18











Vital Signs








  Date Time  Temp Pulse Resp B/P (MAP) Pulse Ox O2 Delivery O2 Flow Rate FiO2


 


1/22/21 09:14   18  96 Room Air  


 


1/22/21 09:05  119      


 


1/22/21 08:00 97.8   131/76 (94)    


 


1/21/21 18:50        














I&O- Last 24 Hours up to 6 AM 


 


 1/22/21





 06:00


 


Intake Total 1710 ml


 


Output Total 5350 ml


 


Balance -3640 ml

















ZEYAD DAVIS MD              Jan 22, 2021 09:39

## 2021-01-23 VITALS — DIASTOLIC BLOOD PRESSURE: 86 MMHG | SYSTOLIC BLOOD PRESSURE: 125 MMHG

## 2021-01-23 VITALS — DIASTOLIC BLOOD PRESSURE: 62 MMHG | SYSTOLIC BLOOD PRESSURE: 108 MMHG

## 2021-01-23 VITALS — DIASTOLIC BLOOD PRESSURE: 58 MMHG | SYSTOLIC BLOOD PRESSURE: 105 MMHG

## 2021-01-23 VITALS — DIASTOLIC BLOOD PRESSURE: 63 MMHG | SYSTOLIC BLOOD PRESSURE: 105 MMHG

## 2021-01-23 VITALS — SYSTOLIC BLOOD PRESSURE: 132 MMHG | DIASTOLIC BLOOD PRESSURE: 64 MMHG

## 2021-01-23 VITALS — SYSTOLIC BLOOD PRESSURE: 140 MMHG | DIASTOLIC BLOOD PRESSURE: 78 MMHG

## 2021-01-23 VITALS — SYSTOLIC BLOOD PRESSURE: 147 MMHG | DIASTOLIC BLOOD PRESSURE: 78 MMHG

## 2021-01-23 LAB
ALBUMIN SERPL BCG-MCNC: 3.7 GM/DL (ref 3.2–5.2)
ALT SERPL W P-5'-P-CCNC: 28 U/L (ref 12–78)
BILIRUB SERPL-MCNC: 0.8 MG/DL (ref 0.2–1)
BUN SERPL-MCNC: 24 MG/DL (ref 7–18)
CALCIUM SERPL-MCNC: 10.1 MG/DL (ref 8.8–10.2)
CHLORIDE SERPL-SCNC: 104 MEQ/L (ref 98–107)
CO2 SERPL-SCNC: 30 MEQ/L (ref 21–32)
CREAT SERPL-MCNC: 1.01 MG/DL (ref 0.55–1.3)
GFR SERPL CREATININE-BSD FRML MDRD: 59.3 ML/MIN/{1.73_M2} (ref 45–?)
GLUCOSE SERPL-MCNC: 101 MG/DL (ref 70–100)
HCT VFR BLD AUTO: 43.8 % (ref 36–47)
HGB BLD-MCNC: 13.9 G/DL (ref 12–15.5)
MCH RBC QN AUTO: 29.3 PG (ref 27–33)
MCHC RBC AUTO-ENTMCNC: 31.7 G/DL (ref 32–36.5)
MCV RBC AUTO: 92.4 FL (ref 80–96)
PLATELET # BLD AUTO: 304 10^3/UL (ref 150–450)
POTASSIUM SERPL-SCNC: 3.6 MEQ/L (ref 3.5–5.1)
PROT SERPL-MCNC: 6.5 GM/DL (ref 6.4–8.2)
RBC # BLD AUTO: 4.74 10^6/UL (ref 4–5.4)
SODIUM SERPL-SCNC: 140 MEQ/L (ref 136–145)
WBC # BLD AUTO: 8.2 10^3/UL (ref 4–10)

## 2021-01-23 RX ADMIN — FUROSEMIDE SCH MG: 10 INJECTION, SOLUTION INTRAMUSCULAR; INTRAVENOUS at 00:10

## 2021-01-23 RX ADMIN — FUROSEMIDE SCH MG: 10 INJECTION, SOLUTION INTRAMUSCULAR; INTRAVENOUS at 09:40

## 2021-01-23 RX ADMIN — CYCLOBENZAPRINE HYDROCHLORIDE PRN MG: 10 TABLET, FILM COATED ORAL at 03:54

## 2021-01-23 RX ADMIN — ESCITALOPRAM OXALATE SCH MG: 10 TABLET, FILM COATED ORAL at 20:59

## 2021-01-23 RX ADMIN — ASPIRIN SCH MG: 325 TABLET, COATED ORAL at 09:39

## 2021-01-23 RX ADMIN — DIGOXIN SCH MG: 250 TABLET ORAL at 09:40

## 2021-01-23 RX ADMIN — MAGNESIUM OXIDE SCH MG: 400 TABLET ORAL at 20:59

## 2021-01-23 RX ADMIN — PANTOPRAZOLE SODIUM SCH MG: 40 TABLET, DELAYED RELEASE ORAL at 20:59

## 2021-01-23 RX ADMIN — FUROSEMIDE SCH MG: 10 INJECTION, SOLUTION INTRAMUSCULAR; INTRAVENOUS at 16:17

## 2021-01-23 RX ADMIN — BUPROPION HYDROCHLORIDE SCH MG: 150 TABLET, FILM COATED, EXTENDED RELEASE ORAL at 09:39

## 2021-01-23 RX ADMIN — CYCLOBENZAPRINE HYDROCHLORIDE PRN MG: 10 TABLET, FILM COATED ORAL at 23:18

## 2021-01-23 RX ADMIN — BUPROPION HYDROCHLORIDE SCH MG: 150 TABLET, FILM COATED, EXTENDED RELEASE ORAL at 20:58

## 2021-01-23 NOTE — IPNPDOC
Text Note


Date of Service


The patient was seen on 1/23/21.





NOTE


Subjective:


Patient seen and examined at bedside. Still tachycardic with exertion. No other 

medical complaints.





Objective:


VITAL SIGNS: see below


GENERAL: NAD, lying comfortably in bed


HEENT: NC/AT


LUNGS: b/l basilar crackles


HEART: irregularly irregular,  +S1S2


ABDOMEN: soft, NT, ND, +BS


EXTREMITIES: no edema





A/P:


60 year old female with history of obesity, osteoarthritis, depression presented

to ED with one week worsening dyspnea on exertion, fatigue, chest pressure found

to be in new onset atrial fibrillation with RVR and elevated BNP concerning for 

new onset CHF. 





# New-onset Atrial fibrillation with RVR


- discussed with cardiology - loaded with digoxin


- continue with maintenance dose - 0.25 mg daily


- echo pending


- CHADSVASC was 1, now possibly 2 with new CHF


- telemetry monitoring


- TSH WNL





#New onset Congestive heart failure, unspecified:


-Echocardiogram pending


-S/p 1 dose lasix 40mg IV in ED


-Continue lasix 40mg Q12H - hold for net negative 2L / day


-Strict I/O


-Daily weights


-Fluid restriction 2,000cc/day





#Osteoarthritis:


-Continue home pain management





#Mood disorder:


-Continue Wellbutrin, lexapro





#GERD:


-Continue Protonix





DVT ppx: TEDs/SCDs





DISPO: pending echo, improvement in heart rate, further diuresis, PT





VS,Fishbone, I+O


VS, Fishbone, I+O


Laboratory Tests


1/23/21 05:36











Vital Signs








  Date Time  Temp Pulse Resp B/P (MAP) Pulse Ox O2 Delivery O2 Flow Rate FiO2


 


1/23/21 10:16   18   Room Air  


 


1/23/21 09:40  110      


 


1/23/21 07:40 97.0   147/78 (101) 96   


 


1/21/21 18:50        














I&O- Last 24 Hours up to 6 AM 


 


 1/23/21





 06:00


 


Intake Total 1620 ml


 


Output Total 4500 ml


 


Balance -2880 ml

















ZEYAD DAVIS MD              Jan 23, 2021 10:45

## 2021-01-24 VITALS — DIASTOLIC BLOOD PRESSURE: 73 MMHG | SYSTOLIC BLOOD PRESSURE: 119 MMHG

## 2021-01-24 VITALS — SYSTOLIC BLOOD PRESSURE: 102 MMHG | DIASTOLIC BLOOD PRESSURE: 55 MMHG

## 2021-01-24 VITALS — SYSTOLIC BLOOD PRESSURE: 123 MMHG | DIASTOLIC BLOOD PRESSURE: 67 MMHG

## 2021-01-24 VITALS — DIASTOLIC BLOOD PRESSURE: 66 MMHG | SYSTOLIC BLOOD PRESSURE: 101 MMHG

## 2021-01-24 VITALS — SYSTOLIC BLOOD PRESSURE: 119 MMHG | DIASTOLIC BLOOD PRESSURE: 77 MMHG

## 2021-01-24 LAB
ALBUMIN SERPL BCG-MCNC: 3.7 GM/DL (ref 3.2–5.2)
ALT SERPL W P-5'-P-CCNC: 32 U/L (ref 12–78)
BILIRUB SERPL-MCNC: 0.5 MG/DL (ref 0.2–1)
BUN SERPL-MCNC: 24 MG/DL (ref 7–18)
CALCIUM SERPL-MCNC: 10.1 MG/DL (ref 8.8–10.2)
CHLORIDE SERPL-SCNC: 102 MEQ/L (ref 98–107)
CO2 SERPL-SCNC: 27 MEQ/L (ref 21–32)
CREAT SERPL-MCNC: 0.93 MG/DL (ref 0.55–1.3)
GFR SERPL CREATININE-BSD FRML MDRD: > 60 ML/MIN/{1.73_M2} (ref 45–?)
GLUCOSE SERPL-MCNC: 106 MG/DL (ref 70–100)
HCT VFR BLD AUTO: 47.9 % (ref 36–47)
HGB BLD-MCNC: 15.3 G/DL (ref 12–15.5)
MCH RBC QN AUTO: 29.7 PG (ref 27–33)
MCHC RBC AUTO-ENTMCNC: 31.9 G/DL (ref 32–36.5)
MCV RBC AUTO: 92.8 FL (ref 80–96)
PLATELET # BLD AUTO: 338 10^3/UL (ref 150–450)
POTASSIUM SERPL-SCNC: 3.6 MEQ/L (ref 3.5–5.1)
PROT SERPL-MCNC: 6.9 GM/DL (ref 6.4–8.2)
RBC # BLD AUTO: 5.16 10^6/UL (ref 4–5.4)
SODIUM SERPL-SCNC: 139 MEQ/L (ref 136–145)
WBC # BLD AUTO: 7.6 10^3/UL (ref 4–10)

## 2021-01-24 RX ADMIN — ASPIRIN SCH MG: 325 TABLET, COATED ORAL at 08:38

## 2021-01-24 RX ADMIN — BUPROPION HYDROCHLORIDE SCH MG: 150 TABLET, FILM COATED, EXTENDED RELEASE ORAL at 20:38

## 2021-01-24 RX ADMIN — PANTOPRAZOLE SODIUM SCH MG: 40 TABLET, DELAYED RELEASE ORAL at 20:38

## 2021-01-24 RX ADMIN — MAGNESIUM OXIDE SCH MG: 400 TABLET ORAL at 20:38

## 2021-01-24 RX ADMIN — DIGOXIN SCH MG: 250 TABLET ORAL at 08:39

## 2021-01-24 RX ADMIN — FUROSEMIDE SCH MG: 10 INJECTION, SOLUTION INTRAMUSCULAR; INTRAVENOUS at 16:12

## 2021-01-24 RX ADMIN — MAGNESIUM HYDROXIDE SCH ML: 400 SUSPENSION ORAL at 20:38

## 2021-01-24 RX ADMIN — ESCITALOPRAM OXALATE SCH MG: 10 TABLET, FILM COATED ORAL at 20:38

## 2021-01-24 RX ADMIN — BUPROPION HYDROCHLORIDE SCH MG: 150 TABLET, FILM COATED, EXTENDED RELEASE ORAL at 08:38

## 2021-01-24 RX ADMIN — FUROSEMIDE SCH MG: 10 INJECTION, SOLUTION INTRAMUSCULAR; INTRAVENOUS at 04:02

## 2021-01-24 RX ADMIN — MAGNESIUM HYDROXIDE SCH ML: 400 SUSPENSION ORAL at 13:39

## 2021-01-24 NOTE — IPN
PROGRESS NOTE



DATE:  01/24/2021



SUBJECTIVE:  Anitha is seen in the PCU, admitted with atrial fibrillation and

rapid ventricular response and congestive heart failure. She is still

tachycardic, is on Digoxin for this. An echocardiogram is pending. She seems to

have diuresed some with her Lasix regimen. She has had approximately 12 liter

diuresis, since 01/21. She is less short of breath. Still not back to her

baseline. She denies any chest pain.



PHYSICAL EXAMINATION:

VITAL SIGNS: Blood pressure 101/66, heart rate is around 120.

GENERAL APPEARANCE: Alert, conversant, in no distress. 

NECK: No JVD. 

LUNGS: Decreased breath sounds but clear.

HEART: Regular rate and rhythm, tachycardic.

ABDOMEN: Soft, nontender. 

EXTREMITIES: Trace peripheral edema.



LABORATORY DATA: An echocardiogram is pending. CBC is unremarkable.

Electrolytes are unremarkable. Potassium is 3.6. 



IMPRESSION:

  1.  Atrial fibrillation with rapid ventricular response. She is on Carvedilol

      and Digoxin. Blood pressure limits pushing the dose of the Carvedilol.

      She is still not fully loaded on Digoxin. If her heart rate still remains

      high elevated will get an opinion from Cardiology.

  2.  Congestive heart failure with an unknown ejection fraction. An

      echocardiogram is pending. She has had tremendous diuresis with

      intravenous Lasix, 12 liters since over the last three days. Furosemide

      is now on hold. 

  3.  For her psychiatric illnesses, she is to continue her Zyban and Lexapro. 

  4.  The patient's CHADS-VASc score is 1 so she is not currently

      anticoagulated. Awaiting for results of echocardiogram.

## 2021-01-25 VITALS — SYSTOLIC BLOOD PRESSURE: 129 MMHG | DIASTOLIC BLOOD PRESSURE: 61 MMHG

## 2021-01-25 VITALS — DIASTOLIC BLOOD PRESSURE: 71 MMHG | SYSTOLIC BLOOD PRESSURE: 115 MMHG

## 2021-01-25 VITALS — SYSTOLIC BLOOD PRESSURE: 115 MMHG | DIASTOLIC BLOOD PRESSURE: 58 MMHG

## 2021-01-25 VITALS — SYSTOLIC BLOOD PRESSURE: 98 MMHG | DIASTOLIC BLOOD PRESSURE: 56 MMHG

## 2021-01-25 VITALS — SYSTOLIC BLOOD PRESSURE: 125 MMHG | DIASTOLIC BLOOD PRESSURE: 69 MMHG

## 2021-01-25 VITALS — SYSTOLIC BLOOD PRESSURE: 110 MMHG | DIASTOLIC BLOOD PRESSURE: 64 MMHG

## 2021-01-25 LAB
ALBUMIN SERPL BCG-MCNC: 3.7 GM/DL (ref 3.2–5.2)
ALT SERPL W P-5'-P-CCNC: 32 U/L (ref 12–78)
BILIRUB SERPL-MCNC: 0.7 MG/DL (ref 0.2–1)
BUN SERPL-MCNC: 29 MG/DL (ref 7–18)
CALCIUM SERPL-MCNC: 10.6 MG/DL (ref 8.8–10.2)
CHLORIDE SERPL-SCNC: 101 MEQ/L (ref 98–107)
CO2 SERPL-SCNC: 34 MEQ/L (ref 21–32)
CREAT SERPL-MCNC: 1.13 MG/DL (ref 0.55–1.3)
GFR SERPL CREATININE-BSD FRML MDRD: 52.1 ML/MIN/{1.73_M2} (ref 45–?)
GLUCOSE SERPL-MCNC: 101 MG/DL (ref 70–100)
HCT VFR BLD AUTO: 49.7 % (ref 36–47)
HGB BLD-MCNC: 15.9 G/DL (ref 12–15.5)
MCH RBC QN AUTO: 29.7 PG (ref 27–33)
MCHC RBC AUTO-ENTMCNC: 32 G/DL (ref 32–36.5)
MCV RBC AUTO: 92.7 FL (ref 80–96)
PLATELET # BLD AUTO: 352 10^3/UL (ref 150–450)
POTASSIUM SERPL-SCNC: 4.3 MEQ/L (ref 3.5–5.1)
PROT SERPL-MCNC: 7.4 GM/DL (ref 6.4–8.2)
RBC # BLD AUTO: 5.36 10^6/UL (ref 4–5.4)
SODIUM SERPL-SCNC: 137 MEQ/L (ref 136–145)
WBC # BLD AUTO: 9.5 10^3/UL (ref 4–10)

## 2021-01-25 RX ADMIN — FUROSEMIDE SCH MG: 40 TABLET ORAL at 16:16

## 2021-01-25 RX ADMIN — BUPROPION HYDROCHLORIDE SCH MG: 150 TABLET, FILM COATED, EXTENDED RELEASE ORAL at 08:07

## 2021-01-25 RX ADMIN — DIGOXIN SCH MG: 250 TABLET ORAL at 08:08

## 2021-01-25 RX ADMIN — FUROSEMIDE SCH MG: 40 TABLET ORAL at 09:48

## 2021-01-25 RX ADMIN — MAGNESIUM HYDROXIDE SCH ML: 400 SUSPENSION ORAL at 08:08

## 2021-01-25 RX ADMIN — MAGNESIUM HYDROXIDE SCH ML: 400 SUSPENSION ORAL at 22:17

## 2021-01-25 RX ADMIN — MAGNESIUM OXIDE SCH MG: 400 TABLET ORAL at 22:17

## 2021-01-25 RX ADMIN — FUROSEMIDE SCH MG: 10 INJECTION, SOLUTION INTRAMUSCULAR; INTRAVENOUS at 04:04

## 2021-01-25 RX ADMIN — PANTOPRAZOLE SODIUM SCH MG: 40 TABLET, DELAYED RELEASE ORAL at 22:17

## 2021-01-25 RX ADMIN — BUPROPION HYDROCHLORIDE SCH MG: 150 TABLET, FILM COATED, EXTENDED RELEASE ORAL at 22:17

## 2021-01-25 RX ADMIN — CYCLOBENZAPRINE HYDROCHLORIDE PRN MG: 10 TABLET, FILM COATED ORAL at 01:18

## 2021-01-25 RX ADMIN — ESCITALOPRAM OXALATE SCH MG: 10 TABLET, FILM COATED ORAL at 22:17

## 2021-01-25 RX ADMIN — ASPIRIN SCH MG: 325 TABLET, COATED ORAL at 08:07

## 2021-01-25 NOTE — IPN
PROGRESS NOTE



DATE:  01/25/2021



SUBJECTIVE:  Anitha is feeling better.  She is still tachycardic. Heart rates 110

to 120 range.  She became dizzy when we tried to get her out of bed yesterday. 

 She did not have documented hypotension but she did limit her activity. Her

input/output was more modest yesterday after the profuse diuresis she had

earlier in her hospitalization. Her echocardiogram is still pending. 



PHYSICAL EXAMINATION:   

VITAL SIGNS:  Vital signs per flow sheet.  Heart rate is around 120 on the

monitor. 

GENERAL APPEARANCE:  She is alert, conversant, no distress. 

HEENT:  She has no JVD. 

LUNGS: Clear.  

HEART:  Regular rate and rhythm, tachycardic, no murmur.

ABDOMEN: Soft, nontender. No masses. 

EXTREMITIES: Trace peripheral edema.



LABS:  Echocardiogram is still pending, apparently it was done Friday. 



White count 9.5, hemoglobin 59, platelets 352. Sodium 167, potassium 4.3, BUN

29, creatinine 1.1, glucose 100. 



IMPRESSION:

1.  Atrial fibrillation with rapid ventricular response. Her blood pressure has

    improved. I am adjusting her dose of Carvedilol. Continue her Digoxin. She

    is on Aspirin for thrombotic effect.

2.  Congestive heart failure. More modest diuresis noted.

3.  Dizziness probably from the diuresis over the previous few days. Physical

    therapy has been ordered.

## 2021-01-25 NOTE — ECHO
DATE OF PROCEDURE: 01/22/2021



Age: 60 

Gender: Female 



REFERRING PHYSICIAN: Aan Shipman MD 



PATIENT LOCATION: Room 3214 



2D MEASUREMENTS:

   IVS 1.4 cm

   LV 4.4 cm

   LVPW 1.5 cm

   LA 4.3 cm

   Aorta 3.0 cm

   IVC 1.8 cm



DOPPLER MEASUREMENT

   Peak velocity across the aortic valve 1.2 m/s

   Peak velocity across the LVOT 0.55 m/s

            Maximum tricuspid valve velocity 2.3 m/s

 

2D COMMENTS: 

1.  Mildly to moderately increased left ventricular wall thickness with normal 
left ventricular size, but with a severely depressed global left ventricular 
systolic function. The estimated left ventricular systolic ejection fraction is 
25% to 30%.

2.  Mildly enlarged left atrium. The right atrium subjectively appeared to be 
mildly enlarged. Normal right ventricle. 

3.  There were findings at the level of the atrial septum consistent with 
lipomatous hypertrophy, but no evidence of defect or shunt. 

4.  Normal aortic root.

5.  A trace pericardial effusion was noted, no evidence of cardiac tamponade.

6.  Mildly calcified aortic valve with normal leaflet excursion. Mildly 
calcified mitral annulus with normal anterior mitral valve leaflet motion. 
Normal tricuspid valve. The pulmonic valve and proximal pulmonary artery 
branches were not well visualized. 

7.  The inferior vena cava was normal in size, central venous pressure is 
probably normal.



DOPPLER:

It detects mild mitral regurgitation, mild tricuspid regurgitation. The 
calculated pulmonary artery systolic pressure varies between 30 to 40 mmHg. 
Assessment of the left ventricular diastolic function was limited in view of the
underlying cardiac arrhythmias. 



IMPRESSION: 

1.  Severe global left ventricular systolic dysfunction with global hypokinesis,
but preserved left ventricular wall thickness. Assessment of the left 
ventricular diastolic function was limited in view of the underlying 
arrhythmias. 

2.  Aortic valve sclerosis without stenosis or aortic regurgitation.

3.  Mildly enlarged left atrium with mild mitral regurgitation and mitral 
annulus calcification.

4.  Mild tricuspid regurgitation with probably mild pulmonary hypertension. 

5.  There were findings consistent with lipomatous hypertrophy of the atrial 
septum, but no evidence of intracardiac shunt detected. 

6.  A trace pericardial effusion was noted, no evidence of cardiac tamponade. 

MTDD

## 2021-01-25 NOTE — IPN
PROGRESS NOTE



DATE:  01/25/2021



SUBJECTIVE:  Anitha is seen in PCU. She had rapid ventricular response last night

and received a dose of Amiodarone. She is still tachycardic. Heart rates in the

120-130 range. She had some dizziness when she got out of bed.  She denies any

chest pain or shortness of breath. She had aggressive diuresis on admission,

about 10 liters over the firs three days.  Yesterday it was more modest.  Blood

pressure seems to have recovered some from this. 



PHYSICAL EXAMINATION:   

VITAL SIGNS:  Vital signs per flow sheet.  Heart rate is around 120 on exam. 

GENERAL APPEARANCE:  She is alert and conversant, no distress. 

HEENT:  No JVD. 

LUNGS: Clear.  

HEART:  Regular rate and rhythm, tachycardic.

ABDOMEN: Soft, nontender. No masses. 

EXTREMITIES: No peripheral edema.



LABS:  Echocardiogram is still pending, apparently it was done Friday. 



CBC unremarkable. Sodium 137, potassium 4.3, BUN 29, creatinine 1.13, glucose

102. 



IMPRESSION:

1.  Atrial fibrillation with rapid ventricular response. Her blood pressure has

    improved. She required some Amiodarone last night Continue her Digoxin.  I

    am changing the dose of her Carvedilol. She is on Aspirin for anticoagulant

    effect.

2.  Congestive heart failure. Echocardiogram is still pending. Apparently it

    was done Monday. Change to by mouth Lasix. 

3.  Various psychiatric illnesses. Continue her psychotropic medications.

## 2021-01-26 VITALS — SYSTOLIC BLOOD PRESSURE: 108 MMHG | DIASTOLIC BLOOD PRESSURE: 70 MMHG

## 2021-01-26 VITALS — DIASTOLIC BLOOD PRESSURE: 63 MMHG | SYSTOLIC BLOOD PRESSURE: 132 MMHG

## 2021-01-26 VITALS — SYSTOLIC BLOOD PRESSURE: 110 MMHG | DIASTOLIC BLOOD PRESSURE: 76 MMHG

## 2021-01-26 VITALS — DIASTOLIC BLOOD PRESSURE: 68 MMHG | SYSTOLIC BLOOD PRESSURE: 114 MMHG

## 2021-01-26 VITALS — SYSTOLIC BLOOD PRESSURE: 108 MMHG | DIASTOLIC BLOOD PRESSURE: 56 MMHG

## 2021-01-26 VITALS — SYSTOLIC BLOOD PRESSURE: 111 MMHG | DIASTOLIC BLOOD PRESSURE: 72 MMHG

## 2021-01-26 LAB
ALBUMIN SERPL BCG-MCNC: 3.5 GM/DL (ref 3.2–5.2)
ALT SERPL W P-5'-P-CCNC: 25 U/L (ref 12–78)
BILIRUB SERPL-MCNC: 0.5 MG/DL (ref 0.2–1)
BUN SERPL-MCNC: 30 MG/DL (ref 7–18)
CALCIUM SERPL-MCNC: 10 MG/DL (ref 8.8–10.2)
CHLORIDE SERPL-SCNC: 101 MEQ/L (ref 98–107)
CO2 SERPL-SCNC: 28 MEQ/L (ref 21–32)
CREAT SERPL-MCNC: 0.98 MG/DL (ref 0.55–1.3)
GFR SERPL CREATININE-BSD FRML MDRD: > 60 ML/MIN/{1.73_M2} (ref 45–?)
GLUCOSE SERPL-MCNC: 100 MG/DL (ref 70–100)
HCT VFR BLD AUTO: 47.2 % (ref 36–47)
HGB BLD-MCNC: 15.1 G/DL (ref 12–15.5)
MCH RBC QN AUTO: 29.2 PG (ref 27–33)
MCHC RBC AUTO-ENTMCNC: 32 G/DL (ref 32–36.5)
MCV RBC AUTO: 91.1 FL (ref 80–96)
PLATELET # BLD AUTO: 332 10^3/UL (ref 150–450)
POTASSIUM SERPL-SCNC: 3.9 MEQ/L (ref 3.5–5.1)
PROT SERPL-MCNC: 6.9 GM/DL (ref 6.4–8.2)
RBC # BLD AUTO: 5.18 10^6/UL (ref 4–5.4)
SODIUM SERPL-SCNC: 137 MEQ/L (ref 136–145)
WBC # BLD AUTO: 7.7 10^3/UL (ref 4–10)

## 2021-01-26 RX ADMIN — METOPROLOL TARTRATE SCH MG: 25 TABLET, FILM COATED ORAL at 06:50

## 2021-01-26 RX ADMIN — MORPHINE SULFATE SCH MG: 30 TABLET ORAL at 17:45

## 2021-01-26 RX ADMIN — APIXABAN SCH MG: 5 TABLET, FILM COATED ORAL at 09:26

## 2021-01-26 RX ADMIN — ESCITALOPRAM OXALATE SCH MG: 10 TABLET, FILM COATED ORAL at 20:30

## 2021-01-26 RX ADMIN — DIGOXIN SCH MG: 250 TABLET ORAL at 09:27

## 2021-01-26 RX ADMIN — PANTOPRAZOLE SODIUM SCH MG: 40 TABLET, DELAYED RELEASE ORAL at 20:30

## 2021-01-26 RX ADMIN — CYCLOBENZAPRINE HYDROCHLORIDE PRN MG: 10 TABLET, FILM COATED ORAL at 02:08

## 2021-01-26 RX ADMIN — MAGNESIUM HYDROXIDE SCH ML: 400 SUSPENSION ORAL at 09:28

## 2021-01-26 RX ADMIN — BUPROPION HYDROCHLORIDE SCH MG: 150 TABLET, FILM COATED, EXTENDED RELEASE ORAL at 20:29

## 2021-01-26 RX ADMIN — BUPROPION HYDROCHLORIDE SCH MG: 150 TABLET, FILM COATED, EXTENDED RELEASE ORAL at 09:26

## 2021-01-26 RX ADMIN — APIXABAN SCH MG: 5 TABLET, FILM COATED ORAL at 20:29

## 2021-01-26 RX ADMIN — MORPHINE SULFATE SCH MG: 30 TABLET ORAL at 20:29

## 2021-01-26 RX ADMIN — MAGNESIUM OXIDE SCH MG: 400 TABLET ORAL at 20:30

## 2021-01-26 RX ADMIN — METOPROLOL TARTRATE SCH MG: 25 TABLET, FILM COATED ORAL at 12:27

## 2021-01-26 RX ADMIN — MAGNESIUM HYDROXIDE SCH ML: 400 SUSPENSION ORAL at 20:31

## 2021-01-26 RX ADMIN — METOPROLOL TARTRATE SCH MG: 25 TABLET, FILM COATED ORAL at 17:45

## 2021-01-26 NOTE — IPNPDOC
Date Seen


The patient was seen on 1/26/21.





Progress Note


SUBJECTIVE:  


HR still in 130's, overnight episode of 150's then improved on own. Discussed 

with Dr. Dunne, consulted cardiology. Meds adjusted with HR some improved. 

Echo: Systolic HF with no prior cardiac history. Denies chest pain, SOB. 





OBJECTIVE: 





PHYSICAL EXAMINATION:   


VITAL SIGNS:  Please see below 


GENERAL APPEARANCE:  NAD, resting in bed, AAOx3 


HEENT:  No JVD. 


LUNGS: Clear.  


HEART:  Regular rate and rhythm, tachycardic.


ABDOMEN: Soft, nontender. No masses. 


EXTREMITIES: No peripheral edema.





LABS:  Please see below 





IMAGING: 


Echocardiogram: 


EF 25-30%


Severe global left ventricular systolic dysfunction with global hypokinesis,but 

preserved left ventricular wall thickness. Assessment of the left ventricular 

diastolic function was limited in view of the underlying arrhythmias. 


Aortic valve sclerosis without stenosis or aortic regurgitation.


Mildly enlarged left atrium with mild mitral regurgitation and mitral annulus 

calcification.


Mild tricuspid regurgitation with probably mild pulmonary hypertension. 


There were findings consistent with lipomatous hypertrophy of the atrial septum,

but no evidence of intracardiac shunt detected. 


A trace pericardial effusion was noted, no evidence of cardiac tamponade. 





A/P: 





Atrial fibrillation with rapid ventricular response


-HR better controlled after medication changes, also cannot r/o incr tachycardia

2/2 to not being on narcotics 


-Echo above 


-Switched to metoprolol Q6H, d/ashish carvedilol


-C/w eliquis, digoxin, resume home meds


-Cardiology following, Dr. Dunne 





HFrEF with exacerbation, likely worsened by Afib with RVR 


-Currently on RA


-Echo above


-Diuretics stopped by cardiology today as may be overdiuresing 


-C/w BB


-F/u cardiology recommendatins 





Joint pain, chronic 


-Resume morphine TID, other meds for break through  





Depression/anxiety


-C/w current treatment 





GERD


-PPI 





DVT px 


-Eliquis 





DISPOSITION: C/w treatment above, cardiology consulted and following. Plan is 

discharge home when medically improved, possibly 1/27/21.





VS, I&O, 24H, Fishbone


Vital Signs/I&O





Vital Signs








  Date Time  Temp Pulse Resp B/P (MAP) Pulse Ox O2 Delivery O2 Flow Rate FiO2


 


1/26/21 16:00 97.2 89 18 132/63 (86) 97 Room Air  


 


1/21/21 18:50        











l


I&O- Last 24 Hours up to 6 AM 


 


 1/26/21





 05:59


 


Intake Total 1440 ml


 


Output Total 1700 ml


 


Balance -260 ml











Laboratory Data


24H LABS


Laboratory Tests 2


1/26/21 05:08: 


Nucleated Red Blood Cells % (auto) 0.0, Anion Gap 8, Glomerular Filtration Rate 

> 60.0, Calcium Level 10.0, Total Bilirubin 0.5, Aspartate Amino Transf 

(AST/SGOT) 23, Alanine Aminotransferase (ALT/SGPT) 25, Alkaline Phosphatase 

135H, Total Protein 6.9, Albumin 3.5, Albumin/Globulin Ratio 1.0L


CBC/BMP


Laboratory Tests


1/26/21 05:08











Current Medications





Current Medications








 Medications


  (Trade)  Dose


 Ordered  Sig/Ramirez


 Route


 PRN Reason  Start Time


 Stop Time Status Last Admin


Dose Admin


 


 Acetaminophen


  (Tylenol Tab)  650 mg  Q4H  PRN


 PO


 MILD PAIN OR FEVER  1/20/21 23:00


     





 


 Amiodarone HCl


 150 mg/IV


 Miscellaneous


 Supplies  100 ml @ 


 600 mls/hr  ONCE


 IV


   1/25/21 01:00


 1/25/21 01:09 DC 1/25/21 01:18





 


 Apixaban


  (Eliquis)  5 mg  BID


 PO


   1/26/21 09:00


    1/26/21 09:26





 


 Aspirin


  (Ecotrin)  325 mg  DAILY


 PO


   1/21/21 09:00


 1/26/21 08:42 DC 1/25/21 08:07





 


 Bupropion HCl


  (Zyban,


 Wellbutrin Sr)  150 mg  BID


 PO


   1/20/21 21:00


    1/26/21 09:26





 


 Carvedilol


  (COReg)  3.125 mg  TID


 PO


   1/23/21 21:00


 1/25/21 08:45 DC 1/25/21 08:08





 


 Carvedilol


  (COReg)  6.25 mg  Q8H


 PO


   1/25/21 14:00


 1/25/21 22:57 DC 1/25/21 13:13





 


 Carvedilol


  (COReg)  6.25 mg  Q8H


 PO


   1/26/21 06:00


 1/26/21 08:42 DC  





 


 Cyclobenzaprine


 HCl


  (Flexeril)  10 mg  TID  PRN


 PO


 SPASMS  1/20/21 23:00


    1/26/21 02:08





 


 Digoxin


  (Lanoxin)  0.25 mg  DAILY


 PO


   1/22/21 09:00


    1/26/21 09:27





 


 Digoxin


  (Lanoxin)  0.25 mg  STAT  STAT


 IV


   1/21/21 23:54


 1/21/21 23:56 DC 1/22/21 00:14





 


 Digoxin


  (Lanoxin)  0.5 mg  STAT  STAT


 PO


   1/20/21 20:07


 1/20/21 20:09 DC 1/20/21 20:24





 


 Escitalopram


 Oxalate


  (Lexapro)  20 mg  QHS


 PO


   1/20/21 21:00


    1/25/21 22:17





 


 Furosemide


  (LASIX injection)  40 mg  Q12H


 IV


   1/23/21 16:00


 1/25/21 08:43 DC 1/25/21 04:04





 


 Furosemide


  (LASIX injection)  40 mg  Q4H


 IV


   1/21/21 00:00


 1/21/21 17:08 DC 1/21/21 04:12





 


 Furosemide


  (LASIX injection)  40 mg  Q8H


 IV


   1/22/21 00:00


 1/23/21 10:29 DC 1/23/21 09:40





 


 Furosemide


  (Lasix)  40 mg  BID@09,17


 PO


   1/25/21 09:00


 1/26/21 08:42 DC 1/25/21 16:16





 


 Home Med


  (Med Rec


 Complete!)    ASDIRECTED


 XX


   1/20/21 21:00


 1/20/21 20:50 DC  





 


 Magnesium


 Hydroxide


  (Milk Of


 Magnesia)  30 ml  BID


 PO


   1/24/21 09:00


    1/26/21 09:28





 


 Magnesium Oxide


  (Mag-Ox)  800 mg  QHS


 PO


   1/20/21 21:00


    1/25/21 22:17





 


 Metoprolol


 Tartrate


  (Lopressor)  5 mg  Q5M


 IV


   1/20/21 18:45


 1/20/21 18:56 DC 1/20/21 19:33





 


 Metoprolol


 Tartrate


  (Lopressor)  12.5 mg  Q6H


 PO


   1/26/21 06:00


    1/26/21 12:27





 


 Nitroglycerin


  (Nitrostat (1/


 150))  0.4 mg  Q5MP  PRN


 SL


 CHEST PAIN  1/20/21 23:30


     





 


 Oxycodone/


 Acetaminophen


  (Percocet 5mg/


 325mg Tablet)  2 tab  Q12HP  PRN


 PO


 SEVERE PAIN (PS 8-10)  1/20/21 23:00


    1/26/21 14:52





 


 Pantoprazole


 Sodium


  (Protonix)  40 mg  QHS


 PO


   1/20/21 21:00


    1/25/21 22:17














Allergies


Coded Allergies:  


     morphine (Verified  Adverse Reaction, Mild, PRURITIS, 1/20/21)


   


   IV MORPHINE MAKES HER ITCH











Gillian Jacobs MD            Jan 26, 2021 16:25

## 2021-01-26 NOTE — CR
CARDIOLOGY CONSULTATION

DATE: 01/26/2021



REFERRING PHYSICIAN:  Dr. Jacobs.



INDICATION: Atrial fibrillation with rapid ventricular response and congestive

heart failure.



HISTORY OF PRESENT ILLNESS:  Mrs. Dietz was previously unknown to me. She

presented to [facility] on January 20th after a several day history of dyspnea and

palpitations. She reported she started feeling unwell probably about two weeks

prior to admission when she felt exhausted even though she did not have any

additional cardiovascular symptoms. Then, just a day prior to admission she

started noticing shortness of breath and a sensation of tachycardia and came to

the Emergency Room for further evaluation. She was found to be in atrial

fibrillation with rapid ventricular response and chest x-ray on physical exam

was consistent with congestive heart failure. She was given Carvedilol and

Digoxin for rate control and intermittently also doses of amiodarone and she

was diuresed. Today, she tells me that she feels markedly improved. She denies

any dyspnea and she no longer has any sensation of palpitations. She gets

lightheaded when she tries to get up.



The patient does not have any prior history of cardiovascular disease. She

tells me that at her baseline she is limited principally by osteoarthritis in

her legs, knees, and hips, and consequently is not overly active but generally

does not have any cardiovascular symptoms. As recently as two or three weeks

ago, she felt that she was completely at her baseline. 



PAST MEDICAL HISTORY: 

  1.  History of obesity. History of bariatric surgery with subsequent weight

      loss of approximately 100 pounds.

  2.  Depression.

  3.  DJD.

  4.  History of small bowel obstruction due to adhesions.

  5.  She denies any history of hypertension, dyslipidemia or diabetes. 



PAST SURGICAL HISTORY: 

  1.  Bariatric surgery, Joshua-en-Y.

  2.  Hemorrhoidectomy.

  3.  History of exploratory laparotomy with appendectomy and abdominal washout.

  4.  History of ventral hernia repair.

  5.  History of left knee and right hip replacement.

  6.  Hysterectomy.

  7.  Right venous stripping.

  8.  Right wrist surgery. 



SOCIAL HISTORY: The patient does not smoke and does not drink. She is a

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 



FAMILY HISTORY: The patient's brother has atrial fibrillation. Patient's father

had a heart attack.



ALLERGIES:  Morphine.



OUTPATIENT MEDICATIONS:

  1.  Aspirin 325 a day.

  2.  Bupropion 150 mg twice a day.

  3.  Vitamin D3.

  4.  Cyclobenzaprine 10 mg as needed.

  5.  Lexapro 20 mg a day.

  6.  Magnesium oxide 400 mg two pills at night.

  7.  Minocycline 500 mg a day.

  8.  Morphine.

  9.  Oxycodone as needed for pain.

  10.Omeprazole 40 mg daily.

  11.Potassium 99 mg a day.



REVIEW OF SYSTEMS: She denies any fever, chills, nausea, vomiting or diarrhea.

There has not been any syncope or near syncope. She does admit that on the day

of admission she had mild discomfort in her chest. She describes it as a dog

sitting on her chest. No history of bleeding. No peripheral edema that she

noted. The rest as per HPI are otherwise negative.



PHYSICAL EXAMINATION:

GENERAL APPEARANCE: Mrs. Dietz is a 60-year-old female who appears her age or

possibly even younger. She does not appear to be in any distress. She is very

pleasant, alert and oriented and appropriate.

VITAL SIGNS: Blood pressure 98/58, heart rate currently between 90 and

100-teens at rest. She is afebrile. Saturation 94% on room air. 

INPUT AND OUTPUT: Fluid balance yesterday was recorded as slightly negative

about 140, weight is 92.4 kilograms. 

NECK: JVP is not elevated. 

LUNGS: Clear with good air movement.

HEART: Irregularly irregular tachycardia. I do not appreciate any gallop, rub

or murmur. 

ABDOMEN: Soft. I do not appreciate any shifting dullness or hepatosplenomegaly.

EXTREMITIES: Free of edema. Peripheral pulses are of good quality.

NEUROLOGIC: Intact. 



LABORATORY DATA: As of this normal, she has a normal basic metabolic panel.

Normal liver function tests. Albumin is 3.5. Troponin on admission was negative

x3 and terminal pro-BNP on admission was 2800. CBC is normal. Her INR was 1.21

on admission. Urine was negative for protein. 



Chest x-ray was consistent with congestive heart failure. 



EKG on admission revealed atrial fibrillation with ventricular rate 155 beats

per minute, there is poor R-wave progression and minimal nonspecific

repolarization abnormalities. An echocardiogram interpreted by Dr. Apple revealed

severe left ventricular systolic dysfunction that was global in nature. No

hemodynamically valvular disease. Surprisingly the CVP and pulmonary artery

pressure were either normal or just mildly elevated based on the study. 



ASSESSMENT AND PLAN: Mrs. Dietz is a 60-year-old female who most likely has

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 I

suspect that the duration of the arrhythmia is longer than she thinks. As far

as the management is concerned, the atrial fibrillation is still not well rate

controlled, currently she has been receiving Digoxin and Carvedilol 6.25 mg

three times a day. Also, intermittently has been receiving amiodarone. I am

going to change Carvedilol to metoprolol because it has better rate controlling

effect and is not as potent of an antihypertensive. Will start a short acting 
form until

we get a better idea of how much she needs, but she needs to be discharged home

on long-acting preparation. I will continue Digoxin, will obtain level tomorrow

morning. I do not appreciate any signs of toxicity. Amiodarone should not be

utilized in this setting because she has not been anticoagulated and converting

her into sinus rhythm is actually not desirable. That brings me to the point of

anticoagulation, she was felt to be a CHADS-VASc score of only 1 on admission

which I believe is inaccurate. She certainly has points for congestive heart

failure and female sex, and consequently clearly anticoagulation is indicated.

She has a history of bariatric surgery but denies any history of GI bleeding

and her CBC is normal. I am going to start her on Eliquis at a standard dose.

Obviously, will discontinue aspirin. I cannot completely rule out underlying

coronary artery disease but nothing in the history seems to be suggest so. As

far as management of congestive heart failure is concerned, at this point she

reports a resolution of dyspnea and she appears to be euvolemic on physical

exam. I believe that the diuretics contribute to her hypotension and I am going

to discontinue the diuretics today. We will likely restart in oral form within

a day or two depending on her clinical condition.



I will follow the patient with you. 

MAYI

## 2021-01-27 VITALS — SYSTOLIC BLOOD PRESSURE: 100 MMHG | DIASTOLIC BLOOD PRESSURE: 63 MMHG

## 2021-01-27 VITALS — SYSTOLIC BLOOD PRESSURE: 98 MMHG | DIASTOLIC BLOOD PRESSURE: 64 MMHG

## 2021-01-27 VITALS — SYSTOLIC BLOOD PRESSURE: 110 MMHG | DIASTOLIC BLOOD PRESSURE: 63 MMHG

## 2021-01-27 VITALS — SYSTOLIC BLOOD PRESSURE: 90 MMHG | DIASTOLIC BLOOD PRESSURE: 60 MMHG

## 2021-01-27 VITALS — DIASTOLIC BLOOD PRESSURE: 59 MMHG | SYSTOLIC BLOOD PRESSURE: 88 MMHG

## 2021-01-27 VITALS — SYSTOLIC BLOOD PRESSURE: 104 MMHG | DIASTOLIC BLOOD PRESSURE: 70 MMHG

## 2021-01-27 LAB
ALBUMIN SERPL BCG-MCNC: 3.6 GM/DL (ref 3.2–5.2)
ALT SERPL W P-5'-P-CCNC: 22 U/L (ref 12–78)
BILIRUB SERPL-MCNC: 0.8 MG/DL (ref 0.2–1)
BUN SERPL-MCNC: 20 MG/DL (ref 7–18)
CALCIUM SERPL-MCNC: 10.7 MG/DL (ref 8.8–10.2)
CHLORIDE SERPL-SCNC: 103 MEQ/L (ref 98–107)
CO2 SERPL-SCNC: 35 MEQ/L (ref 21–32)
CREAT SERPL-MCNC: 1.1 MG/DL (ref 0.55–1.3)
DIGOXIN SERPL-MCNC: 1.6 NG/ML (ref 0.5–2)
GFR SERPL CREATININE-BSD FRML MDRD: 53.8 ML/MIN/{1.73_M2} (ref 45–?)
GLUCOSE SERPL-MCNC: 95 MG/DL (ref 70–100)
HCT VFR BLD AUTO: 48.1 % (ref 36–47)
HGB BLD-MCNC: 15.1 G/DL (ref 12–15.5)
MCH RBC QN AUTO: 29.3 PG (ref 27–33)
MCHC RBC AUTO-ENTMCNC: 31.4 G/DL (ref 32–36.5)
MCV RBC AUTO: 93.4 FL (ref 80–96)
PLATELET # BLD AUTO: 295 10^3/UL (ref 150–450)
POTASSIUM SERPL-SCNC: 5 MEQ/L (ref 3.5–5.1)
PROT SERPL-MCNC: 6.4 GM/DL (ref 6.4–8.2)
RBC # BLD AUTO: 5.15 10^6/UL (ref 4–5.4)
SODIUM SERPL-SCNC: 141 MEQ/L (ref 136–145)
WBC # BLD AUTO: 6.4 10^3/UL (ref 4–10)

## 2021-01-27 RX ADMIN — APIXABAN SCH MG: 5 TABLET, FILM COATED ORAL at 08:20

## 2021-01-27 RX ADMIN — METOPROLOL TARTRATE SCH MG: 25 TABLET, FILM COATED ORAL at 00:35

## 2021-01-27 RX ADMIN — METOPROLOL TARTRATE SCH MG: 25 TABLET, FILM COATED ORAL at 06:08

## 2021-01-27 RX ADMIN — DIGOXIN SCH MG: 250 TABLET ORAL at 08:22

## 2021-01-27 RX ADMIN — MORPHINE SULFATE SCH MG: 30 TABLET ORAL at 15:49

## 2021-01-27 RX ADMIN — MORPHINE SULFATE SCH MG: 30 TABLET ORAL at 08:21

## 2021-01-27 RX ADMIN — BUPROPION HYDROCHLORIDE SCH MG: 150 TABLET, FILM COATED, EXTENDED RELEASE ORAL at 08:21

## 2021-01-27 RX ADMIN — MAGNESIUM HYDROXIDE SCH ML: 400 SUSPENSION ORAL at 08:28

## 2021-01-27 NOTE — DS.PDOC
Discharge Summary


General


Date of Admission


Jan 20, 2021 at 21:10


Date of Discharge


1/27/21


Attending Physician:  Gillian Jacobs MD





Discharge Summary


HISTORY OF PRESENT ILLNESS: Anitha Dietz is a 59 YO F with history of 

osteoarthritis, depression, obesity who presents to ED at the direction of her 

PCP for abnormal CXR. She reports that for one week now she has had worsening 

shortness of breath with exertion, fatigue, and chest pressure that radiates 

into her back. She notes that she did have an upper respiratory infection she 

caught from her grand-daughters around [place] but has since recovered. She 

was feeling well after this infection until a week ago. She first noticed her 

heart was racing as she was trying to fall asleep at night and feeling 

intermittently hot/cold. She denies any palpitations or lower extremity 

swelling. Her PCP ordered a CXR which she did at [facility] showing 

congestion and interstitial edema. She denies any shortness of breath at r

est--only with exertion. She does have a long family history of CAD and her 

father passed away from an MI at the age of 45. Her brother and her mother both 

have atrial fibrillation. 





HOSPITAL COURSE: 


Patient was admitted for better control of atrial fib, new diagnosis of CHF. 

Echocardiogram done and showed EF 25-30%, severe global left ventricular 

systolic dysfunction with global hypokinesis, but preserved left ventricular 

wall thickness. Assessment of the left ventricular diastolic function was 

limited in view of the underlying arrhythmias. There was also aortic valve 

sclerosis without stenosis or aortic regurgitation. Cardiology was consulted. 

With medication changes, atrial fibrillation with rapid ventricular response 

resolved, HR better controlled after medication changes- switched to metoprolol 

Q6Hand later toprol XL.  She was started on eliquis, digoxin. Her newly 

diagnosed HFrEF with exacerbation, likely worsened by Afib with RVR had also 

improved over time. She was discharged on meds above plus low dose lasix. Other 

chronic issues remained stable. On 1/27/21 patient was discharged in improved 

condition, cleared by PT. She will f/u with Dr. Dunne, cardiology after dischar

 within 1 week. 





PAST MEDICAL HISTORY:


Obesity


Depression


Arthritis


History of SBO 2/2 adhesions from previous abd surgeries, 2018





PAST SURGICAL HISTORY:


Right wrist surgery.


Right greater saphenous vein stripping.


Left knee replacement.


Right hip replacement.


Hysterectomy.


Joshua-en-Y gastric bypass.


Hemorrhoidectomy.


Exploratory laparotomy with appendectomy and abdominal washout.


Open ventral hernia repair with mesh.





SOCIAL HISTORY:


Never smoker


Occasional EtOH


Denies other illicit drugs


Retired, lives with . Cares for 2 young grandchildren





FAMILY HISTORY:


Reviewed and noncontributory


ALLERGIES: 


Please see below. 





DISCHARGE MEDICATIONS: 


Please see below.





PHYSICAL EXAMINATION:   


VITAL SIGNS:  Please see below 


GENERAL APPEARANCE:  NAD, resting in bed, AAOx3 


HEENT:  No JVD. 


LUNGS: Clear.  


HEART:  Regular rate and rhythm, tachycardic.


ABDOMEN: Soft, nontender. No masses. 


EXTREMITIES: No peripheral edema.





LABS:  Please see below 





IMAGING: 


Echocardiogram: 


EF 25-30%


Severe global left ventricular systolic dysfunction with global hypokinesis,but 

preserved left ventricular wall thickness. Assessment of the left ventricular 

diastolic function was limited in view of the underlying arrhythmias. 


Aortic valve sclerosis without stenosis or aortic regurgitation.


Mildly enlarged left atrium with mild mitral regurgitation and mitral annulus 

calcification.


Mild tricuspid regurgitation with probably mild pulmonary hypertension. 


There were findings consistent with lipomatous hypertrophy of the atrial septum,

but no evidence of intracardiac shunt detected. 


A trace pericardial effusion was noted, no evidence of cardiac tamponade. 





A/P: 





Atrial fibrillation, resolved rapid ventricular response


-HR better controlled after medication changes, also cannot r/o incr tachycardia

2/2 to not being on narcotics 


-Echo above 


-C/w toprol, eliquis, digoxin


-Cardiology,Dr. Dunne  to f/u after discharge. 





HFrEF with exacerbation, likely worsened by Afib with RVR 


-Currently on RA


-Echo above


-C/w toprol, lasix 


-F/u cardiology recommendatins 





Joint pain, chronic 


-Resume morphine TID, other meds for break through  





Depression/anxiety


-C/whome meds 





GERD


-PPI 





DISPOSITION: F/u with PCP and cardiology after discharge. 








TIME SPENT ON DISCHARGE: Greater than 30 minutes.





Vital Signs/I&Os





Vital Signs








  Date Time  Temp Pulse Resp B/P (MAP) Pulse Ox O2 Delivery O2 Flow Rate FiO2


 


1/27/21 15:49   18   Room Air  


 


1/27/21 12:00 98.2 83  90/60 (70) 96   


 


1/21/21 18:50        














I&O- Last 24 Hours up to 6 AM 


 


 1/27/21





 05:59


 


Intake Total 1200 ml


 


Output Total 2325 ml


 


Balance -1125 ml











Laboratory Data


Labs 24H


Laboratory Tests 2


1/27/21 05:59: 


Nucleated Red Blood Cells % (auto) 0.0, Anion Gap 3L, Glomerular Filtration Rate

53.8, Calcium Level 10.7H, Total Bilirubin 0.8#, Aspartate Amino Transf 

(AST/SGOT) 15, Alanine Aminotransferase (ALT/SGPT) 22, Alkaline Phosphatase 

122H, Total Protein 6.4, Albumin 3.6, Albumin/Globulin Ratio 1.3, Digoxin Level 

1.6


CBC/BMP


Laboratory Tests


1/27/21 05:59











Discharge Medications


Scheduled


Apixaban (Eliquis) 5 Mg Tablet, 5 MG PO BID


Bupropion Hcl (Bupropion HCl Sr) 150 Mg Tab, 150 MG PO BID, (Reported)


Cholecalciferol (Vitamin D3) (Vitamin D3) 1,000 Unit Tablet, 5,000 UNITS PO 

QWEEK, (Reported)


   MONDAYS 


Digoxin (Digoxin) 125 Mcg Tablet, 0.125 MG PO DAILY


Ergocalciferol (Vitamin D2) (Vitamin D2) 50,000 Units Cap, 50,000 UNITS PO 

QWEEK, (Reported)


   SATURDAYS 


Escitalopram Oxalate (Lexapro) 20 Mg Tab, 20 MG PO QHS, (Reported)


Furosemide (Furosemide) 20 Mg Tablet, 20 MG PO DAILY


Magnesium Oxide (Magnesium Oxide) 400 Mg Tablet, 800 MG PO QHS, (Reported)


Metoprolol Succinate (Metoprolol Succinate) 50 Mg Tab.er.24h, 50 MG PO DAILY


Minocycline HCl (Minocycline HCl) 50 Mg Capsule, 50 MG PO DAILY, (Reported)


Morphine Sulfate (Morphine Sulfate) 15 Mg Tablet, 15 MG PO TID, (Reported)


Pantoprazole Sodium (Pantoprazole Sodium) 40 Mg Tab, 40 MG PO QHS, (Reported)


Potassium Gluconate (Potassium) 99 Mg Tablet, 198 MG PO QHS, (Reported)





Scheduled PRN


Cyclobenzaprine HCl (Cyclobenzaprine HCl) 10 Mg Tablet, 10 MG PO TID PRN for 

SPASMS, (Reported)


Oxycodone HCl/Acetaminophen (Percocet  mg Tablet) 1 Each Tablet, 1 TAB PO 

BID PRN for PAIN, (Reported)





Allergies


Coded Allergies:  


     morphine (Verified  Adverse Reaction, Mild, PRURITIS, 1/20/21)


   


   IV MORPHINE MAKES HER ITCH











Gillian Jacobs MD            Jan 27, 2021 18:57

## 2021-01-27 NOTE — IPN
PROGRESS NOTE





DATE:  01/27/2021



CHIEF COMPLAINT:  Mrs. Dietz is feeling much better. Her heart rate has

slowed down and she denies any dyspnea or chest pain. She does not have any

palpitations. 



Vital signs this morning: Blood pressure 100/63, heart rate has been in the 70s

and 80s. She is afebrile. Saturation 95% on room air. Fluid balance yesterday

was recorded as negative 1300. Weight is recorded as 91.7 kg. She is alert and

oriented, and appropriate. Her jugular venous pressure (JVP) is not high. Lungs

are clear with good air movement. Heart exam reveals irregular rhythm without

gallop, rub or murmur. Abdomen is soft, nontender. There is no peripheral edema.



LABORATORIES:  Basic metabolic panel: sodium 141, potassium 4.0, BUN 20,

creatinine 1.1, glucose 95, calcium was 10.7; otherwise, albumin 3.6. CBC is

normal and digoxin level was 1.6.



ASSESSMENT AND PLAN:  Mrs. Dietz is a 60-year-old female who presented with

congestive heart failure likely due to tachycardia-induced cardiomyopathy with

recent-onset atrial fibrillation. Her rate control now seems to be accomplished

on a combination of metoprolol and digoxin. I am going to change metoprolol to

Toprol XL 50 mg daily and because the digoxin level is relatively high, we will

hold it today and restart tomorrow at the reduced dose 0.125 mg daily. I intend

to see the patient in followup early next week. I will restart her on low-dose

diuretic, furosemide 20 mg daily. I talked to her about sodium and fluid

restrictions and also that she should listen to her symptoms and if she gets

short of breath or dizzy, she should stop whatever activity she has been doing.

She should remain chronically anticoagulated. From my perspective, she can be

discharged home today.



Edited:  [facility] 01/28/2021 [id number]

MTDD

## 2021-04-13 ENCOUNTER — HOSPITAL ENCOUNTER (OUTPATIENT)
Dept: HOSPITAL 53 - M SLEEP | Age: 61
End: 2021-04-13
Attending: NURSE PRACTITIONER
Payer: MEDICARE

## 2021-04-13 DIAGNOSIS — G47.30: Primary | ICD-10-CM

## 2021-04-14 NOTE — SLEEPCENT
DATE: 04/13/2021



ORDERED BY: Madeleine Gil



Nocturnal polysomnography was performed for evaluation of sleep physiology in

this patient with a history of excessive somnolence and irregular breathing in

sleep as well as nonrestorative sleep.



There was 7 hours and 44 minutes of data reviewed. There was 357 minutes of

sleep identified. Sleep latency was normal at 8.5 minutes. REM sleep was

delayed at 225 minutes. Sleep architecture showed poor progression and

fragmentation. There were two brief REM cycles. Overall sleep efficiency 78.1%.

The electrocardiogram showed a sinus rhythm  with an average heart rate of 50

beats per minute.  EEG showed mild coarsening in background, otherwise normal

waveforms for wake and sleep. There were 172 respiratory events identified of

10 seconds in duration or greater for an apnea-hypopnea index of 28.9. The

events were primarily obstructive, but 29 mixed and central apneas were seen.

Events were not exclusive to sleep stage nor to sleep position. Arousals from

respiratory events occurred 2.9 times per hour, and oxygen desaturations were

seen into the 80s. There was some minor limb activity, but arousals from limb

events were few. Snoring was noted over the entire study.



IMPRESSION: 

Obstructive sleep apnea syndrome (G47.33). Apnea-hypopnea index 28.9.



RECOMMENDATION: The patient should be encouraged to return to the sleep

disorder center for pressure therapy. In the interim, alcohol and sedative

avoidance should be practiced and caution exercised during the operation of

motor vehicles.
Applied

## 2021-05-11 ENCOUNTER — HOSPITAL ENCOUNTER (OUTPATIENT)
Dept: HOSPITAL 53 - M SLEEP | Age: 61
End: 2021-05-11
Attending: NURSE PRACTITIONER
Payer: MEDICARE

## 2021-05-11 DIAGNOSIS — G47.33: Primary | ICD-10-CM

## 2021-05-12 NOTE — SLEEPCENT
DATE: 05/11/2021



ORDERED BY: Madeleine Gil



Nocturnal polysomnography was performed for the titration of pressure therapy

in this patient with obstructive sleep apnea syndrome, apnea-hypopnea index

28.9. 



For testing, a ResMed AirFit full-face mask of medium size was used. There was

4 cm of water pressure applied to the circuit, and the lights were

extinguished. 



There was 7 hours and 25 minutes of data reviewed. There were 268.5 minutes of

sleep identified. Sleep latency was prolonged at 64.5 minutes. REM sleep was

not achieved. Sleep architecture showed poor progression and persistence of

fragmentation. Overall sleep efficiency was 61%. The electrocardiogram showed a

sinus rhythm with an average heart rate of 52 beats per minute. EEG showed

coarsening of background. Some artifactual changes were noted. No focal events

were seen. There were reasonably waveforms for wake and sleep. No REM sleep was

identified. Persistence of hypopneic pattering prompted increases in CPAP

pressure. On review of the record, optimal CPAP pressure based on this study

was 5 cm of water. There was some limb activity noted in the EMG leads. On this

occasion, the limb movement arousal index was 5.6. Oxygen saturation with CPAP

at 5 cm remained 90% plus. 



IMPRESSION: 

Obstructive sleep apnea syndrome.



RECOMMENDATION: Initiation of pressure therapy at 5 cm of water would appear

appropriate based on this study. Close clinical followup is necessary. The

patient did not achieve REM sleep during the test.

## 2022-06-23 ENCOUNTER — HOSPITAL ENCOUNTER (OUTPATIENT)
Dept: HOSPITAL 53 - M WHC | Age: 62
End: 2022-06-23
Attending: FAMILY MEDICINE
Payer: MEDICARE

## 2022-06-23 DIAGNOSIS — Z12.31: Primary | ICD-10-CM

## 2023-02-23 ENCOUNTER — HOSPITAL ENCOUNTER (OUTPATIENT)
Dept: HOSPITAL 53 - M LABSMTC | Age: 63
End: 2023-02-23
Attending: ANESTHESIOLOGY
Payer: COMMERCIAL

## 2023-02-23 DIAGNOSIS — Z01.812: Primary | ICD-10-CM

## 2023-02-23 DIAGNOSIS — Z11.52: ICD-10-CM

## 2023-02-28 ENCOUNTER — HOSPITAL ENCOUNTER (OUTPATIENT)
Dept: HOSPITAL 53 - M OPP | Age: 63
Discharge: HOME | End: 2023-02-28
Attending: SURGERY
Payer: COMMERCIAL

## 2023-02-28 VITALS — HEIGHT: 67 IN | WEIGHT: 153.4 LBS | BODY MASS INDEX: 24.08 KG/M2

## 2023-02-28 VITALS — SYSTOLIC BLOOD PRESSURE: 115 MMHG | DIASTOLIC BLOOD PRESSURE: 61 MMHG

## 2023-02-28 DIAGNOSIS — K31.89: ICD-10-CM

## 2023-02-28 DIAGNOSIS — M79.7: ICD-10-CM

## 2023-02-28 DIAGNOSIS — D12.5: Primary | ICD-10-CM

## 2023-02-28 DIAGNOSIS — K44.9: ICD-10-CM

## 2023-02-28 DIAGNOSIS — G47.33: ICD-10-CM

## 2023-02-28 DIAGNOSIS — Z79.890: ICD-10-CM

## 2023-02-28 DIAGNOSIS — Z79.899: ICD-10-CM

## 2023-02-28 DIAGNOSIS — Z88.5: ICD-10-CM

## 2023-02-28 DIAGNOSIS — K63.5: ICD-10-CM

## 2023-02-28 DIAGNOSIS — I48.91: ICD-10-CM

## 2023-02-28 DIAGNOSIS — Z99.89: ICD-10-CM

## 2023-02-28 DIAGNOSIS — Z79.82: ICD-10-CM

## 2023-02-28 DIAGNOSIS — D50.9: ICD-10-CM

## 2023-05-04 ENCOUNTER — HOSPITAL ENCOUNTER (OUTPATIENT)
Dept: HOSPITAL 53 - M LAB | Age: 63
End: 2023-05-04
Attending: NURSE PRACTITIONER
Payer: MEDICARE

## 2023-05-04 DIAGNOSIS — D50.9: Primary | ICD-10-CM

## 2023-05-04 LAB
BUN SERPL-MCNC: 14 MG/DL (ref 9–23)
CALCIUM SERPL-MCNC: 9.6 MG/DL (ref 8.3–10.6)
CHLORIDE SERPL-SCNC: 107 MMOL/L (ref 98–107)
CO2 SERPL-SCNC: 25 MMOL/L (ref 20–31)
CREAT SERPL-MCNC: 0.82 MG/DL (ref 0.55–1.3)
GFR SERPL CREATININE-BSD FRML MDRD: > 60 ML/MIN/{1.73_M2} (ref 45–?)
GLUCOSE SERPL-MCNC: 92 MG/DL (ref 74–106)
HCT VFR BLD AUTO: 42.6 % (ref 36–47)
HGB BLD-MCNC: 13.7 G/DL (ref 12–15.5)
IRON SATN MFR SERPL: 18.4 % (ref 13.2–45)
IRON SERPL-MCNC: 67 UG/DL (ref 50–170)
MCH RBC QN AUTO: 30.5 PG (ref 27–33)
MCHC RBC AUTO-ENTMCNC: 32.2 G/DL (ref 32–36.5)
MCV RBC AUTO: 94.9 FL (ref 80–96)
PLATELET # BLD AUTO: 232 10^3/UL (ref 150–450)
POTASSIUM SERPL-SCNC: 4.1 MMOL/L (ref 3.5–5.1)
RBC # BLD AUTO: 4.49 10^6/UL (ref 4–5.4)
SODIUM SERPL-SCNC: 141 MMOL/L (ref 136–145)
TIBC SERPL-MCNC: 365 UG/DL (ref 250–425)
WBC # BLD AUTO: 7.1 10^3/UL (ref 4–10)

## 2023-06-09 ENCOUNTER — HOSPITAL ENCOUNTER (EMERGENCY)
Dept: HOSPITAL 53 - M ED | Age: 63
Discharge: ANOTHER HOSPITAL | End: 2023-06-09
Payer: MEDICARE

## 2023-06-09 VITALS — TEMPERATURE: 98 F

## 2023-06-09 VITALS — OXYGEN SATURATION: 96 % | DIASTOLIC BLOOD PRESSURE: 68 MMHG | SYSTOLIC BLOOD PRESSURE: 100 MMHG

## 2023-06-09 VITALS — BODY MASS INDEX: 37.75 KG/M2 | HEIGHT: 67 IN | WEIGHT: 240.5 LBS

## 2023-06-09 DIAGNOSIS — E03.9: ICD-10-CM

## 2023-06-09 DIAGNOSIS — Z88.5: ICD-10-CM

## 2023-06-09 DIAGNOSIS — K81.0: Primary | ICD-10-CM

## 2023-06-09 DIAGNOSIS — I50.20: ICD-10-CM

## 2023-06-09 DIAGNOSIS — K21.9: ICD-10-CM

## 2023-06-09 DIAGNOSIS — F32.A: ICD-10-CM

## 2023-06-09 DIAGNOSIS — Z98.84: ICD-10-CM

## 2023-06-09 LAB
ALBUMIN SERPL BCG-MCNC: 3.4 G/DL (ref 3.2–5.2)
ALBUMIN SERPL BCG-MCNC: 3.9 G/DL (ref 3.2–5.2)
ALP SERPL-CCNC: 199 U/L (ref 46–116)
ALP SERPL-CCNC: 229 U/L (ref 46–116)
ALT SERPL W P-5'-P-CCNC: 125 U/L (ref 7–40)
ALT SERPL W P-5'-P-CCNC: 372 U/L (ref 7–40)
AST SERPL-CCNC: 292 U/L (ref ?–34)
AST SERPL-CCNC: 820 U/L (ref ?–34)
BASOPHILS # BLD AUTO: 0 10^3/UL (ref 0–0.2)
BASOPHILS NFR BLD AUTO: 0.2 % (ref 0–1)
BILIRUB CONJ SERPL-MCNC: 1.1 MG/DL (ref ?–0.4)
BILIRUB CONJ SERPL-MCNC: 1.8 MG/DL (ref ?–0.4)
BILIRUB SERPL-MCNC: 1.8 MG/DL (ref 0.3–1.2)
BILIRUB SERPL-MCNC: 2.8 MG/DL (ref 0.3–1.2)
BUN SERPL-MCNC: 29 MG/DL (ref 9–23)
CALCIUM SERPL-MCNC: 10 MG/DL (ref 8.3–10.6)
CHLORIDE SERPL-SCNC: 102 MMOL/L (ref 98–107)
CK MB CFR.DF SERPL CALC: 2.27
CK MB SERPL-MCNC: < 1 NG/ML (ref ?–3.6)
CK SERPL-CCNC: 44 U/L (ref 34–145)
CO2 SERPL-SCNC: 24 MMOL/L (ref 20–31)
CREAT SERPL-MCNC: 0.93 MG/DL (ref 0.55–1.3)
EOSINOPHIL # BLD AUTO: 0 10^3/UL (ref 0–0.5)
EOSINOPHIL NFR BLD AUTO: 0.3 % (ref 0–3)
GFR SERPL CREATININE-BSD FRML MDRD: > 60 ML/MIN/{1.73_M2} (ref 45–?)
GLUCOSE SERPL-MCNC: 206 MG/DL (ref 74–106)
HCT VFR BLD AUTO: 41.1 % (ref 36–47)
HGB BLD-MCNC: 13.6 G/DL (ref 12–15.5)
INR PPP: 1.05
LIPASE SERPL-CCNC: 65 U/L (ref 12–53)
LIPASE SERPL-CCNC: 76 U/L (ref 12–53)
LYMPHOCYTES # BLD AUTO: 0.6 10^3/UL (ref 1.5–5)
LYMPHOCYTES NFR BLD AUTO: 4.9 % (ref 24–44)
MCH RBC QN AUTO: 30.8 PG (ref 27–33)
MCHC RBC AUTO-ENTMCNC: 33.1 G/DL (ref 32–36.5)
MCV RBC AUTO: 93.2 FL (ref 80–96)
MONOCYTES # BLD AUTO: 0.2 10^3/UL (ref 0–0.8)
MONOCYTES NFR BLD AUTO: 1.4 % (ref 2–8)
NEUTROPHILS # BLD AUTO: 11 10^3/UL (ref 1.5–8.5)
NEUTROPHILS NFR BLD AUTO: 92.7 % (ref 36–66)
PLATELET # BLD AUTO: 259 10^3/UL (ref 150–450)
POTASSIUM SERPL-SCNC: 3.9 MMOL/L (ref 3.5–5.1)
PROT SERPL-MCNC: 6.1 G/DL (ref 5.7–8.2)
PROT SERPL-MCNC: 6.7 G/DL (ref 5.7–8.2)
PROTHROMBIN TIME: 13.9 SECONDS (ref 12.5–14.5)
RBC # BLD AUTO: 4.41 10^6/UL (ref 4–5.4)
RSV RNA NPH QL NAA+PROBE: NEGATIVE
SODIUM SERPL-SCNC: 136 MMOL/L (ref 136–145)
WBC # BLD AUTO: 11.9 10^3/UL (ref 4–10)

## 2023-06-09 PROCEDURE — 82248 BILIRUBIN DIRECT: CPT

## 2023-06-09 PROCEDURE — 80053 COMPREHEN METABOLIC PANEL: CPT

## 2023-06-09 PROCEDURE — 85025 COMPLETE CBC W/AUTO DIFF WBC: CPT

## 2023-06-09 PROCEDURE — 94760 N-INVAS EAR/PLS OXIMETRY 1: CPT

## 2023-06-09 PROCEDURE — 71045 X-RAY EXAM CHEST 1 VIEW: CPT

## 2023-06-09 PROCEDURE — 83605 ASSAY OF LACTIC ACID: CPT

## 2023-06-09 PROCEDURE — 83690 ASSAY OF LIPASE: CPT

## 2023-06-09 PROCEDURE — 76705 ECHO EXAM OF ABDOMEN: CPT

## 2023-06-09 PROCEDURE — 74177 CT ABD & PELVIS W/CONTRAST: CPT

## 2023-06-09 PROCEDURE — 84484 ASSAY OF TROPONIN QUANT: CPT

## 2023-06-09 PROCEDURE — 87631 RESP VIRUS 3-5 TARGETS: CPT

## 2023-06-09 PROCEDURE — 96375 TX/PRO/DX INJ NEW DRUG ADDON: CPT

## 2023-06-09 PROCEDURE — 96365 THER/PROPH/DIAG IV INF INIT: CPT

## 2023-06-09 PROCEDURE — 82550 ASSAY OF CK (CPK): CPT

## 2023-06-09 PROCEDURE — 82553 CREATINE MB FRACTION: CPT

## 2023-06-09 PROCEDURE — 93005 ELECTROCARDIOGRAM TRACING: CPT

## 2023-06-09 PROCEDURE — 96361 HYDRATE IV INFUSION ADD-ON: CPT

## 2023-06-09 PROCEDURE — 80076 HEPATIC FUNCTION PANEL: CPT

## 2023-06-09 PROCEDURE — 99285 EMERGENCY DEPT VISIT HI MDM: CPT

## 2023-06-09 PROCEDURE — 85610 PROTHROMBIN TIME: CPT

## 2023-09-06 ENCOUNTER — HOSPITAL ENCOUNTER (OUTPATIENT)
Dept: HOSPITAL 53 - M RADPRO | Age: 63
End: 2023-09-06
Attending: NURSE PRACTITIONER
Payer: MEDICARE

## 2023-09-06 DIAGNOSIS — R06.02: Primary | ICD-10-CM

## 2024-05-09 ENCOUNTER — HOSPITAL ENCOUNTER (OUTPATIENT)
Dept: HOSPITAL 53 - M WHC | Age: 64
End: 2024-05-09
Attending: FAMILY MEDICINE
Payer: MEDICARE

## 2024-05-09 DIAGNOSIS — Z12.31: Primary | ICD-10-CM

## 2025-01-20 ENCOUNTER — HOSPITAL ENCOUNTER (OUTPATIENT)
Dept: HOSPITAL 53 - M LAB | Age: 65
End: 2025-01-20
Attending: FAMILY MEDICINE
Payer: MEDICARE

## 2025-01-20 DIAGNOSIS — E78.00: ICD-10-CM

## 2025-01-20 DIAGNOSIS — R73.03: Primary | ICD-10-CM

## 2025-01-20 LAB
ALBUMIN SERPL BCG-MCNC: 3.9 G/DL (ref 3.2–5.2)
ALP SERPL-CCNC: 158 U/L (ref 35–104)
ALT SERPL W P-5'-P-CCNC: 32 U/L (ref 7–40)
AST SERPL-CCNC: 40 U/L (ref ?–34)
BASOPHILS # BLD AUTO: 0 10^3/UL (ref 0–0.2)
BASOPHILS NFR BLD AUTO: 0.7 % (ref 0–1)
BILIRUB SERPL-MCNC: 0.7 MG/DL (ref 0.3–1.2)
BUN SERPL-MCNC: 17 MG/DL (ref 9–23)
CALCIUM SERPL-MCNC: 10.6 MG/DL (ref 8.3–10.6)
CHLORIDE SERPL-SCNC: 108 MMOL/L (ref 98–107)
CHOLEST SERPL-MCNC: 170 MG/DL (ref ?–200)
CHOLEST/HDLC SERPL: 2.95 {RATIO} (ref ?–5)
CO2 SERPL-SCNC: 28 MMOL/L (ref 20–31)
CREAT SERPL-MCNC: 0.86 MG/DL (ref 0.55–1.3)
EOSINOPHIL # BLD AUTO: 0.2 10^3/UL (ref 0–0.5)
EOSINOPHIL NFR BLD AUTO: 2.7 % (ref 0–3)
EST. AVERAGE GLUCOSE BLD GHB EST-MCNC: 126 MG/DL (ref 60–110)
FOLATE SERPL-MCNC: 23.42 NG/ML (ref 5.4–?)
GFR SERPL CREATININE-BSD FRML MDRD: > 60 ML/MIN/{1.73_M2} (ref 45–?)
GLUCOSE SERPL-MCNC: 102 MG/DL (ref 74–106)
HCT VFR BLD AUTO: 40.9 % (ref 36–47)
HDLC SERPL-MCNC: 57.5 MG/DL (ref 40–?)
HGB BLD-MCNC: 12.9 G/DL (ref 12–15.5)
LDLC SERPL CALC-MCNC: 95.1 MG/DL (ref ?–100)
LYMPHOCYTES # BLD AUTO: 2.1 10^3/UL (ref 1.5–5)
LYMPHOCYTES NFR BLD AUTO: 34.8 % (ref 24–44)
MCH RBC QN AUTO: 29.1 PG (ref 27–33)
MCHC RBC AUTO-ENTMCNC: 31.5 G/DL (ref 32–36.5)
MCV RBC AUTO: 92.3 FL (ref 80–96)
MONOCYTES # BLD AUTO: 0.6 10^3/UL (ref 0–0.8)
MONOCYTES NFR BLD AUTO: 10.2 % (ref 2–8)
NEUTROPHILS # BLD AUTO: 3 10^3/UL (ref 1.5–8.5)
NEUTROPHILS NFR BLD AUTO: 51.3 % (ref 36–66)
NONHDLC SERPL-MCNC: 112.5 MG/DL
PLATELET # BLD AUTO: 257 10^3/UL (ref 150–450)
POTASSIUM SERPL-SCNC: 4.7 MMOL/L (ref 3.5–5.1)
PROT SERPL-MCNC: 6.6 G/DL (ref 5.7–8.2)
RBC # BLD AUTO: 4.43 10^6/UL (ref 4–5.4)
SODIUM SERPL-SCNC: 145 MMOL/L (ref 136–145)
T4 FREE SERPL-MCNC: 1.28 NG/DL (ref 0.89–1.76)
TRIGL SERPL-MCNC: 87 MG/DL (ref ?–150)
TSH SERPL DL<=0.005 MIU/L-ACNC: 0.38 UIU/ML (ref 0.55–4.78)
VIT B12 SERPL-MCNC: 872 PG/ML (ref 211–911)
WBC # BLD AUTO: 5.9 10^3/UL (ref 4–10)

## 2025-05-12 ENCOUNTER — HOSPITAL ENCOUNTER (OUTPATIENT)
Dept: HOSPITAL 53 - M WHC | Age: 65
End: 2025-05-12
Attending: FAMILY MEDICINE
Payer: MEDICARE

## 2025-05-12 DIAGNOSIS — Z12.31: Primary | ICD-10-CM

## 2025-05-12 DIAGNOSIS — Z13.820: ICD-10-CM
